# Patient Record
Sex: MALE | Race: BLACK OR AFRICAN AMERICAN | Employment: FULL TIME | URBAN - METROPOLITAN AREA
[De-identification: names, ages, dates, MRNs, and addresses within clinical notes are randomized per-mention and may not be internally consistent; named-entity substitution may affect disease eponyms.]

---

## 2017-10-11 ENCOUNTER — HOSPITAL ENCOUNTER (EMERGENCY)
Age: 42
Discharge: HOME | End: 2017-10-11
Attending: EMERGENCY MEDICINE
Payer: COMMERCIAL

## 2017-10-11 VITALS
HEIGHT: 68 IN | DIASTOLIC BLOOD PRESSURE: 119 MMHG | TEMPERATURE: 99 F | SYSTOLIC BLOOD PRESSURE: 170 MMHG | HEART RATE: 84 BPM | BODY MASS INDEX: 38.12 KG/M2 | RESPIRATION RATE: 20 BRPM | OXYGEN SATURATION: 93 % | WEIGHT: 251.54 LBS

## 2017-10-11 DIAGNOSIS — I10 HYPERTENSION, UNSPECIFIED TYPE: Primary | ICD-10-CM

## 2017-10-11 LAB
ALBUMIN SERPL-MCNC: 3.9 G/DL (ref 3.5–5)
ALBUMIN/GLOB SERPL: 1 {RATIO} (ref 1.1–2.2)
ALP SERPL-CCNC: 79 U/L (ref 45–117)
ALT SERPL-CCNC: 37 U/L (ref 12–78)
ANION GAP SERPL CALC-SCNC: 7 MMOL/L (ref 5–15)
AST SERPL-CCNC: 21 U/L (ref 15–37)
BASOPHILS # BLD: 0 K/UL (ref 0–0.1)
BASOPHILS NFR BLD: 0 % (ref 0–1)
BILIRUB SERPL-MCNC: 0.6 MG/DL (ref 0.2–1)
BUN SERPL-MCNC: 16 MG/DL (ref 6–20)
BUN/CREAT SERPL: 14 (ref 12–20)
CALCIUM SERPL-MCNC: 8.8 MG/DL (ref 8.5–10.1)
CHLORIDE SERPL-SCNC: 103 MMOL/L (ref 97–108)
CO2 SERPL-SCNC: 29 MMOL/L (ref 21–32)
CREAT SERPL-MCNC: 1.12 MG/DL (ref 0.7–1.3)
EOSINOPHIL # BLD: 0.3 K/UL (ref 0–0.4)
EOSINOPHIL NFR BLD: 3 % (ref 0–7)
ERYTHROCYTE [DISTWIDTH] IN BLOOD BY AUTOMATED COUNT: 13 % (ref 11.5–14.5)
GLOBULIN SER CALC-MCNC: 3.8 G/DL (ref 2–4)
GLUCOSE SERPL-MCNC: 99 MG/DL (ref 65–100)
HCT VFR BLD AUTO: 42.5 % (ref 36.6–50.3)
HGB BLD-MCNC: 14.6 G/DL (ref 12.1–17)
LYMPHOCYTES # BLD: 2.5 K/UL (ref 0.8–3.5)
LYMPHOCYTES NFR BLD: 31 % (ref 12–49)
MCH RBC QN AUTO: 28.4 PG (ref 26–34)
MCHC RBC AUTO-ENTMCNC: 34.4 G/DL (ref 30–36.5)
MCV RBC AUTO: 82.7 FL (ref 80–99)
MONOCYTES # BLD: 0.7 K/UL (ref 0–1)
MONOCYTES NFR BLD: 8 % (ref 5–13)
NEUTS SEG # BLD: 4.5 K/UL (ref 1.8–8)
NEUTS SEG NFR BLD: 58 % (ref 32–75)
PLATELET # BLD AUTO: 221 K/UL (ref 150–400)
POTASSIUM SERPL-SCNC: 3.8 MMOL/L (ref 3.5–5.1)
PROT SERPL-MCNC: 7.7 G/DL (ref 6.4–8.2)
RBC # BLD AUTO: 5.14 M/UL (ref 4.1–5.7)
SODIUM SERPL-SCNC: 139 MMOL/L (ref 136–145)
TROPONIN I SERPL-MCNC: <0.04 NG/ML
WBC # BLD AUTO: 8 K/UL (ref 4.1–11.1)

## 2017-10-11 PROCEDURE — 99284 EMERGENCY DEPT VISIT MOD MDM: CPT

## 2017-10-11 PROCEDURE — 93005 ELECTROCARDIOGRAM TRACING: CPT

## 2017-10-11 PROCEDURE — 85025 COMPLETE CBC W/AUTO DIFF WBC: CPT | Performed by: EMERGENCY MEDICINE

## 2017-10-11 PROCEDURE — 80053 COMPREHEN METABOLIC PANEL: CPT | Performed by: EMERGENCY MEDICINE

## 2017-10-11 PROCEDURE — 36415 COLL VENOUS BLD VENIPUNCTURE: CPT | Performed by: EMERGENCY MEDICINE

## 2017-10-11 PROCEDURE — 84484 ASSAY OF TROPONIN QUANT: CPT | Performed by: EMERGENCY MEDICINE

## 2017-10-11 RX ORDER — AMLODIPINE BESYLATE 10 MG/1
10 TABLET ORAL DAILY
Qty: 30 TAB | Refills: 0 | Status: ON HOLD | OUTPATIENT
Start: 2017-10-11 | End: 2020-05-07

## 2017-10-11 RX ORDER — SODIUM CHLORIDE 0.9 % (FLUSH) 0.9 %
5-10 SYRINGE (ML) INJECTION AS NEEDED
Status: DISCONTINUED | OUTPATIENT
Start: 2017-10-11 | End: 2017-10-12 | Stop reason: HOSPADM

## 2017-10-11 RX ORDER — SODIUM CHLORIDE 0.9 % (FLUSH) 0.9 %
5-10 SYRINGE (ML) INJECTION EVERY 8 HOURS
Status: DISCONTINUED | OUTPATIENT
Start: 2017-10-11 | End: 2017-10-12 | Stop reason: HOSPADM

## 2017-10-12 LAB
ATRIAL RATE: 84 BPM
CALCULATED P AXIS, ECG09: 46 DEGREES
CALCULATED R AXIS, ECG10: -8 DEGREES
CALCULATED T AXIS, ECG11: 175 DEGREES
DIAGNOSIS, 93000: NORMAL
P-R INTERVAL, ECG05: 154 MS
Q-T INTERVAL, ECG07: 366 MS
QRS DURATION, ECG06: 82 MS
QTC CALCULATION (BEZET), ECG08: 432 MS
VENTRICULAR RATE, ECG03: 84 BPM

## 2017-10-12 NOTE — ED PROVIDER NOTES
HPI Comments: 38 y.o. male with no significant past medical history who presents from [facility] with chief complaint of HTN. The pt reports that he had an episode of epistaxis a week ago and today. The pt went to [facility] , was found to be hypertensive, and was referred to visit the ED for an evaluation. The pt reports that he has been checking his blood pressure at [facility] for a year and it has been high. The pt also reports intermittent HA for a year. The pt denies CP and SOB. There are no other acute medical concerns at this time. PCP: No primary care provider on file. Note written by Lakhwinder Rodriguez, as dictated by Cecilio Hauser MD 9:01 PM      The history is provided by the patient. No  was used. No past medical history on file. No past surgical history on file. No family history on file. Social History     Social History    Marital status: N/A     Spouse name: N/A    Number of children: N/A    Years of education: N/A     Occupational History    Not on file. Social History Main Topics    Smoking status: Not on file    Smokeless tobacco: Not on file    Alcohol use Not on file    Drug use: Not on file    Sexual activity: Not on file     Other Topics Concern    Not on file     Social History Narrative         ALLERGIES: Benadryl [diphenhydramine hcl]    Review of Systems   Respiratory: Negative for shortness of breath. Cardiovascular: Negative for chest pain. All other systems reviewed and are negative. Vitals:    10/11/17 2042   BP: (!) 195/130   Pulse: 88   Resp: 18   Temp: 99 °F (37.2 °C)   SpO2: 96%   Weight: 114.1 kg (251 lb 8.7 oz)   Height: 5' 8\" (1.727 m)            Physical Exam   Constitutional: He is oriented to person, place, and time. He appears well-developed and well-nourished. No distress. Obese;     HENT:   Head: Normocephalic and atraumatic. Eyes: Conjunctivae are normal. No scleral icterus.    Neck: Neck supple. No tracheal deviation present. Cardiovascular: Normal rate, regular rhythm, normal heart sounds and intact distal pulses. Exam reveals no gallop and no friction rub. No murmur heard. Pulmonary/Chest: Effort normal and breath sounds normal. He has no wheezes. He has no rales. Abdominal: Soft. He exhibits no distension. There is no tenderness. There is no rebound and no guarding. Musculoskeletal: He exhibits no edema. Neurological: He is alert and oriented to person, place, and time. Skin: Skin is warm and dry. No rash noted. Psychiatric: He has a normal mood and affect. Nursing note and vitals reviewed. Note written by Lakhwinder Mchugh, as dictated by Thalia Espinal MD 9:01 PM      [facility]  ED Course       Procedures    ED EKG interpretation:  Rhythm: normal sinus rhythm; and regular . Rate (approx.): 82; Axis: normal; ST/T wave: lateral T wave inversions; LVH  Note written by Lakhwinder Mchugh, as dictated by Thalia Espinal MD 9:04 PM    Progress Note:  Results, treatment, and follow up plan have been discussed with patient. Questions were answered. Thalia Espinal MD  9:56 PM    A/P: HTN - suspect this has been an ongoing problem. Reassuring exam; EKG with LVH and non-specific. CBC, CMP, trop ok; amlodipine and PCP f/u.   Thalia Espinal MD  9:58 PM

## 2017-10-12 NOTE — ED NOTES
Patient given discharge instruction by Vilma Christianson. Verbalized understanding, pt discharge home with family.

## 2017-10-12 NOTE — ED TRIAGE NOTES
Pt reports being sent by [facility] for severe HTN.  BL=824/120 at their facility. Denies personal hx of HTN. States last week started with nose bleeds-went to [facility] for evaluation.

## 2017-10-12 NOTE — DISCHARGE INSTRUCTIONS

## 2020-05-06 ENCOUNTER — APPOINTMENT (OUTPATIENT)
Dept: GENERAL RADIOLOGY | Age: 45
DRG: 280 | End: 2020-05-06
Attending: EMERGENCY MEDICINE

## 2020-05-06 ENCOUNTER — HOSPITAL ENCOUNTER (INPATIENT)
Age: 45
LOS: 3 days | Discharge: HOME | DRG: 280 | End: 2020-05-09
Attending: EMERGENCY MEDICINE | Admitting: INTERNAL MEDICINE

## 2020-05-06 DIAGNOSIS — M79.89 LEG SWELLING: ICD-10-CM

## 2020-05-06 DIAGNOSIS — R60.9 EDEMA, UNSPECIFIED TYPE: Primary | ICD-10-CM

## 2020-05-06 DIAGNOSIS — N28.9 RENAL INSUFFICIENCY: ICD-10-CM

## 2020-05-06 DIAGNOSIS — R77.8 ELEVATED TROPONIN: ICD-10-CM

## 2020-05-06 PROBLEM — R06.09 DYSPNEA ON EXERTION: Status: ACTIVE | Noted: 2020-05-06

## 2020-05-06 LAB
ALBUMIN SERPL-MCNC: 3.5 G/DL (ref 3.5–5)
ALBUMIN/GLOB SERPL: 0.8 {RATIO} (ref 1.1–2.2)
ALP SERPL-CCNC: 86 U/L (ref 45–117)
ALT SERPL-CCNC: 51 U/L (ref 12–78)
ANION GAP SERPL CALC-SCNC: 10 MMOL/L (ref 5–15)
AST SERPL-CCNC: 33 U/L (ref 15–37)
BASOPHILS # BLD: 0.1 K/UL (ref 0–0.1)
BASOPHILS NFR BLD: 1 % (ref 0–1)
BILIRUB SERPL-MCNC: 0.6 MG/DL (ref 0.2–1)
BNP SERPL-MCNC: 6911 PG/ML
BUN SERPL-MCNC: 23 MG/DL (ref 6–20)
BUN/CREAT SERPL: 11 (ref 12–20)
CALCIUM SERPL-MCNC: 8.9 MG/DL (ref 8.5–10.1)
CHLORIDE SERPL-SCNC: 100 MMOL/L (ref 97–108)
CO2 SERPL-SCNC: 27 MMOL/L (ref 21–32)
COMMENT, HOLDF: NORMAL
CREAT SERPL-MCNC: 2.15 MG/DL (ref 0.7–1.3)
DIFFERENTIAL METHOD BLD: ABNORMAL
EOSINOPHIL # BLD: 0.2 K/UL (ref 0–0.4)
EOSINOPHIL NFR BLD: 2 % (ref 0–7)
ERYTHROCYTE [DISTWIDTH] IN BLOOD BY AUTOMATED COUNT: 13.3 % (ref 11.5–14.5)
GLOBULIN SER CALC-MCNC: 4.6 G/DL (ref 2–4)
GLUCOSE SERPL-MCNC: 122 MG/DL (ref 65–100)
HCT VFR BLD AUTO: 43.5 % (ref 36.6–50.3)
HGB BLD-MCNC: 14.6 G/DL (ref 12.1–17)
IMM GRANULOCYTES # BLD AUTO: 0.1 K/UL (ref 0–0.04)
IMM GRANULOCYTES NFR BLD AUTO: 1 % (ref 0–0.5)
INR PPP: 1 (ref 0.9–1.1)
LYMPHOCYTES # BLD: 1.6 K/UL (ref 0.8–3.5)
LYMPHOCYTES NFR BLD: 17 % (ref 12–49)
MAGNESIUM SERPL-MCNC: 2.2 MG/DL (ref 1.6–2.4)
MCH RBC QN AUTO: 28 PG (ref 26–34)
MCHC RBC AUTO-ENTMCNC: 33.6 G/DL (ref 30–36.5)
MCV RBC AUTO: 83.3 FL (ref 80–99)
MONOCYTES # BLD: 0.7 K/UL (ref 0–1)
MONOCYTES NFR BLD: 7 % (ref 5–13)
NEUTS SEG # BLD: 6.9 K/UL (ref 1.8–8)
NEUTS SEG NFR BLD: 72 % (ref 32–75)
NRBC # BLD: 0 K/UL (ref 0–0.01)
NRBC BLD-RTO: 0 PER 100 WBC
PLATELET # BLD AUTO: 151 K/UL (ref 150–400)
POTASSIUM SERPL-SCNC: 3.5 MMOL/L (ref 3.5–5.1)
PROT SERPL-MCNC: 8.1 G/DL (ref 6.4–8.2)
PROTHROMBIN TIME: 10.4 SEC (ref 9–11.1)
RBC # BLD AUTO: 5.22 M/UL (ref 4.1–5.7)
SAMPLES BEING HELD,HOLD: NORMAL
SODIUM SERPL-SCNC: 137 MMOL/L (ref 136–145)
TROPONIN I SERPL-MCNC: 0.17 NG/ML
WBC # BLD AUTO: 9.6 K/UL (ref 4.1–11.1)

## 2020-05-06 PROCEDURE — 80053 COMPREHEN METABOLIC PANEL: CPT

## 2020-05-06 PROCEDURE — 74011250636 HC RX REV CODE- 250/636: Performed by: EMERGENCY MEDICINE

## 2020-05-06 PROCEDURE — 85025 COMPLETE CBC W/AUTO DIFF WBC: CPT

## 2020-05-06 PROCEDURE — 99285 EMERGENCY DEPT VISIT HI MDM: CPT

## 2020-05-06 PROCEDURE — 74011250637 HC RX REV CODE- 250/637: Performed by: EMERGENCY MEDICINE

## 2020-05-06 PROCEDURE — 83880 ASSAY OF NATRIURETIC PEPTIDE: CPT

## 2020-05-06 PROCEDURE — 65270000029 HC RM PRIVATE

## 2020-05-06 PROCEDURE — 83735 ASSAY OF MAGNESIUM: CPT

## 2020-05-06 PROCEDURE — 93005 ELECTROCARDIOGRAM TRACING: CPT

## 2020-05-06 PROCEDURE — 84484 ASSAY OF TROPONIN QUANT: CPT

## 2020-05-06 PROCEDURE — 71045 X-RAY EXAM CHEST 1 VIEW: CPT

## 2020-05-06 PROCEDURE — 36415 COLL VENOUS BLD VENIPUNCTURE: CPT

## 2020-05-06 PROCEDURE — 85610 PROTHROMBIN TIME: CPT

## 2020-05-06 RX ORDER — HYDRALAZINE HYDROCHLORIDE 20 MG/ML
20 INJECTION INTRAMUSCULAR; INTRAVENOUS
Status: DISCONTINUED | OUTPATIENT
Start: 2020-05-06 | End: 2020-05-09 | Stop reason: HOSPADM

## 2020-05-06 RX ORDER — CLONIDINE HYDROCHLORIDE 0.1 MG/1
0.1 TABLET ORAL
Status: DISCONTINUED | OUTPATIENT
Start: 2020-05-06 | End: 2020-05-09 | Stop reason: HOSPADM

## 2020-05-06 RX ORDER — ASPIRIN 325 MG
325 TABLET ORAL
Status: COMPLETED | OUTPATIENT
Start: 2020-05-06 | End: 2020-05-06

## 2020-05-06 RX ORDER — FUROSEMIDE 10 MG/ML
40 INJECTION INTRAMUSCULAR; INTRAVENOUS
Status: COMPLETED | OUTPATIENT
Start: 2020-05-06 | End: 2020-05-06

## 2020-05-06 RX ORDER — HYDRALAZINE HYDROCHLORIDE 20 MG/ML
30 INJECTION INTRAMUSCULAR; INTRAVENOUS
Status: COMPLETED | OUTPATIENT
Start: 2020-05-06 | End: 2020-05-06

## 2020-05-06 RX ADMIN — HYDRALAZINE HYDROCHLORIDE 30 MG: 20 INJECTION INTRAMUSCULAR; INTRAVENOUS at 23:35

## 2020-05-06 RX ADMIN — FUROSEMIDE 40 MG: 10 INJECTION, SOLUTION INTRAMUSCULAR; INTRAVENOUS at 22:41

## 2020-05-06 RX ADMIN — ASPIRIN 325 MG: 325 TABLET ORAL at 22:40

## 2020-05-07 ENCOUNTER — APPOINTMENT (OUTPATIENT)
Dept: ULTRASOUND IMAGING | Age: 45
DRG: 280 | End: 2020-05-07
Attending: INTERNAL MEDICINE

## 2020-05-07 ENCOUNTER — APPOINTMENT (OUTPATIENT)
Dept: NON INVASIVE DIAGNOSTICS | Age: 45
DRG: 280 | End: 2020-05-07
Attending: INTERNAL MEDICINE

## 2020-05-07 LAB
ALBUMIN SERPL-MCNC: 3.6 G/DL (ref 3.5–5)
ALBUMIN/GLOB SERPL: 0.9 {RATIO} (ref 1.1–2.2)
ALP SERPL-CCNC: 89 U/L (ref 45–117)
ALT SERPL-CCNC: 49 U/L (ref 12–78)
ANION GAP SERPL CALC-SCNC: 9 MMOL/L (ref 5–15)
APPEARANCE UR: CLEAR
AST SERPL-CCNC: 30 U/L (ref 15–37)
ATRIAL RATE: 116 BPM
AV VELOCITY RATIO: 0.62
BACTERIA URNS QL MICRO: NEGATIVE /HPF
BASOPHILS # BLD: 0.1 K/UL (ref 0–0.1)
BASOPHILS NFR BLD: 0 % (ref 0–1)
BILIRUB SERPL-MCNC: 0.8 MG/DL (ref 0.2–1)
BILIRUB UR QL: NEGATIVE
BUN SERPL-MCNC: 24 MG/DL (ref 6–20)
BUN/CREAT SERPL: 11 (ref 12–20)
CALCIUM SERPL-MCNC: 8.9 MG/DL (ref 8.5–10.1)
CALCULATED P AXIS, ECG09: 42 DEGREES
CALCULATED R AXIS, ECG10: 8 DEGREES
CALCULATED T AXIS, ECG11: 158 DEGREES
CHLORIDE SERPL-SCNC: 98 MMOL/L (ref 97–108)
CHOLEST SERPL-MCNC: 290 MG/DL
CO2 SERPL-SCNC: 31 MMOL/L (ref 21–32)
COLOR UR: ABNORMAL
COMMENT, HOLDF: NORMAL
CREAT SERPL-MCNC: 2.21 MG/DL (ref 0.7–1.3)
CREAT UR-MCNC: 99 MG/DL
DIAGNOSIS, 93000: NORMAL
DIFFERENTIAL METHOD BLD: ABNORMAL
ECHO AO ROOT DIAM: 3.6 CM
ECHO AV AREA PEAK VELOCITY: 2.1 CM2
ECHO AV PEAK GRADIENT: 14.2 MMHG
ECHO AV PEAK VELOCITY: 188.12 CM/S
ECHO EST RA PRESSURE: 3 MMHG
ECHO LA MAJOR AXIS: 4.41 CM
ECHO LA TO AORTIC ROOT RATIO: 1.22
ECHO LA VOL 2C: 113.44 ML (ref 18–58)
ECHO LA VOL 4C: 72.78 ML (ref 18–58)
ECHO LA VOL BP: 96.93 ML (ref 18–58)
ECHO LA VOL/BSA BIPLANE: 42.77 ML/M2 (ref 16–28)
ECHO LA VOLUME INDEX A2C: 50.06 ML/M2 (ref 16–28)
ECHO LA VOLUME INDEX A4C: 32.12 ML/M2 (ref 16–28)
ECHO LV INTERNAL DIMENSION DIASTOLIC: 5.51 CM (ref 4.2–5.9)
ECHO LV INTERNAL DIMENSION SYSTOLIC: 3.91 CM
ECHO LV IVSD: 1.63 CM (ref 0.6–1)
ECHO LV MASS 2D: 479.9 G (ref 88–224)
ECHO LV MASS INDEX 2D: 211.8 G/M2 (ref 49–115)
ECHO LV POSTERIOR WALL DIASTOLIC: 1.49 CM (ref 0.6–1)
ECHO LVOT DIAM: 2.07 CM
ECHO LVOT PEAK GRADIENT: 5.5 MMHG
ECHO LVOT PEAK VELOCITY: 117.02 CM/S
ECHO MV A VELOCITY: 45.36 CM/S
ECHO MV E DECELERATION TIME (DT): 156.6 MS
ECHO MV E VELOCITY: 95.82 CM/S
ECHO MV E/A RATIO: 2.11
ECHO MV REGURGITANT PEAK GRADIENT: 47.3 MMHG
ECHO MV REGURGITANT PEAK VELOCITY: 343.96 CM/S
ECHO PULMONARY ARTERY SYSTOLIC PRESSURE (PASP): 31.8 MMHG
ECHO PV MAX VELOCITY: 104.13 CM/S
ECHO PV PEAK GRADIENT: 4.3 MMHG
ECHO RIGHT VENTRICULAR SYSTOLIC PRESSURE (RVSP): 31.8 MMHG
ECHO RV INTERNAL DIMENSION: 3.95 CM
ECHO TV REGURGITANT MAX VELOCITY: 268.33 CM/S
ECHO TV REGURGITANT PEAK GRADIENT: 28.8 MMHG
EOSINOPHIL # BLD: 0.1 K/UL (ref 0–0.4)
EOSINOPHIL NFR BLD: 1 % (ref 0–7)
EPITH CASTS URNS QL MICRO: ABNORMAL /LPF
ERYTHROCYTE [DISTWIDTH] IN BLOOD BY AUTOMATED COUNT: 13.2 % (ref 11.5–14.5)
GLOBULIN SER CALC-MCNC: 3.9 G/DL (ref 2–4)
GLUCOSE SERPL-MCNC: 146 MG/DL (ref 65–100)
GLUCOSE UR STRIP.AUTO-MCNC: NEGATIVE MG/DL
HBV SURFACE AG SER QL: <0.1 INDEX
HBV SURFACE AG SER QL: NEGATIVE
HCT VFR BLD AUTO: 44 % (ref 36.6–50.3)
HCV AB SERPL QL IA: NONREACTIVE
HCV COMMENT,HCGAC: NORMAL
HDLC SERPL-MCNC: 68 MG/DL
HDLC SERPL: 4.3 {RATIO} (ref 0–5)
HGB BLD-MCNC: 14.6 G/DL (ref 12.1–17)
HGB UR QL STRIP: NEGATIVE
IMM GRANULOCYTES # BLD AUTO: 0.1 K/UL (ref 0–0.04)
IMM GRANULOCYTES NFR BLD AUTO: 1 % (ref 0–0.5)
KETONES UR QL STRIP.AUTO: NEGATIVE MG/DL
LDLC SERPL CALC-MCNC: 184 MG/DL (ref 0–100)
LEUKOCYTE ESTERASE UR QL STRIP.AUTO: NEGATIVE
LIPID PROFILE,FLP: ABNORMAL
LVFS 2D: 28.98 %
LYMPHOCYTES # BLD: 1 K/UL (ref 0.8–3.5)
LYMPHOCYTES NFR BLD: 8 % (ref 12–49)
MCH RBC QN AUTO: 27.7 PG (ref 26–34)
MCHC RBC AUTO-ENTMCNC: 33.2 G/DL (ref 30–36.5)
MCV RBC AUTO: 83.5 FL (ref 80–99)
MONOCYTES # BLD: 0.8 K/UL (ref 0–1)
MONOCYTES NFR BLD: 6 % (ref 5–13)
MV DEC SLOPE: 6.12
NEUTS SEG # BLD: 10.6 K/UL (ref 1.8–8)
NEUTS SEG NFR BLD: 84 % (ref 32–75)
NITRITE UR QL STRIP.AUTO: NEGATIVE
NRBC # BLD: 0 K/UL (ref 0–0.01)
NRBC BLD-RTO: 0 PER 100 WBC
P-R INTERVAL, ECG05: 130 MS
PH UR STRIP: 7 [PH] (ref 5–8)
PLATELET # BLD AUTO: 209 K/UL (ref 150–400)
PMV BLD AUTO: 10.3 FL (ref 8.9–12.9)
POTASSIUM SERPL-SCNC: 3.4 MMOL/L (ref 3.5–5.1)
PROT SERPL-MCNC: 7.5 G/DL (ref 6.4–8.2)
PROT UR STRIP-MCNC: 300 MG/DL
PROT UR-MCNC: 233 MG/DL (ref 0–11.9)
PULMONARY ARTERY END DIASTOLIC PRESSURE: 5.6 MMHG
PULMONARY ARTERY MEAN PRESURE: 14.3 MMHG
PV END DIASTOLIC VELOCITY: 0.8 MMHG
Q-T INTERVAL, ECG07: 310 MS
QRS DURATION, ECG06: 72 MS
QTC CALCULATION (BEZET), ECG08: 430 MS
RBC # BLD AUTO: 5.27 M/UL (ref 4.1–5.7)
RBC #/AREA URNS HPF: ABNORMAL /HPF (ref 0–5)
SAMPLES BEING HELD,HOLD: NORMAL
SODIUM SERPL-SCNC: 138 MMOL/L (ref 136–145)
SP GR UR REFRACTOMETRY: 1.01 (ref 1–1.03)
TRIGL SERPL-MCNC: 190 MG/DL (ref ?–150)
TROPONIN I SERPL-MCNC: 0.16 NG/ML
TSH SERPL DL<=0.05 MIU/L-ACNC: 2.37 UIU/ML (ref 0.36–3.74)
UR CULT HOLD, URHOLD: NORMAL
UROBILINOGEN UR QL STRIP.AUTO: 0.2 EU/DL (ref 0.2–1)
VENTRICULAR RATE, ECG03: 116 BPM
VLDLC SERPL CALC-MCNC: 38 MG/DL
WBC # BLD AUTO: 12.6 K/UL (ref 4.1–11.1)
WBC URNS QL MICRO: ABNORMAL /HPF (ref 0–4)

## 2020-05-07 PROCEDURE — 86160 COMPLEMENT ANTIGEN: CPT

## 2020-05-07 PROCEDURE — 93306 TTE W/DOPPLER COMPLETE: CPT

## 2020-05-07 PROCEDURE — 83835 ASSAY OF METANEPHRINES: CPT

## 2020-05-07 PROCEDURE — 87340 HEPATITIS B SURFACE AG IA: CPT

## 2020-05-07 PROCEDURE — 82570 ASSAY OF URINE CREATININE: CPT

## 2020-05-07 PROCEDURE — 84443 ASSAY THYROID STIM HORMONE: CPT

## 2020-05-07 PROCEDURE — 83516 IMMUNOASSAY NONANTIBODY: CPT

## 2020-05-07 PROCEDURE — 84156 ASSAY OF PROTEIN URINE: CPT

## 2020-05-07 PROCEDURE — 86038 ANTINUCLEAR ANTIBODIES: CPT

## 2020-05-07 PROCEDURE — 82384 ASSAY THREE CATECHOLAMINES: CPT

## 2020-05-07 PROCEDURE — 65660000000 HC RM CCU STEPDOWN

## 2020-05-07 PROCEDURE — 82088 ASSAY OF ALDOSTERONE: CPT

## 2020-05-07 PROCEDURE — 85025 COMPLETE CBC W/AUTO DIFF WBC: CPT

## 2020-05-07 PROCEDURE — 77010033678 HC OXYGEN DAILY

## 2020-05-07 PROCEDURE — 80053 COMPREHEN METABOLIC PANEL: CPT

## 2020-05-07 PROCEDURE — 74011250636 HC RX REV CODE- 250/636: Performed by: INTERNAL MEDICINE

## 2020-05-07 PROCEDURE — 36415 COLL VENOUS BLD VENIPUNCTURE: CPT

## 2020-05-07 PROCEDURE — 74011250637 HC RX REV CODE- 250/637: Performed by: INTERNAL MEDICINE

## 2020-05-07 PROCEDURE — 86256 FLUORESCENT ANTIBODY TITER: CPT

## 2020-05-07 PROCEDURE — 80061 LIPID PANEL: CPT

## 2020-05-07 PROCEDURE — 76770 US EXAM ABDO BACK WALL COMP: CPT

## 2020-05-07 PROCEDURE — 81001 URINALYSIS AUTO W/SCOPE: CPT

## 2020-05-07 PROCEDURE — 86803 HEPATITIS C AB TEST: CPT

## 2020-05-07 PROCEDURE — 94760 N-INVAS EAR/PLS OXIMETRY 1: CPT

## 2020-05-07 PROCEDURE — 84484 ASSAY OF TROPONIN QUANT: CPT

## 2020-05-07 PROCEDURE — 84165 PROTEIN E-PHORESIS SERUM: CPT

## 2020-05-07 RX ORDER — SODIUM CHLORIDE 0.9 % (FLUSH) 0.9 %
5-40 SYRINGE (ML) INJECTION AS NEEDED
Status: DISCONTINUED | OUTPATIENT
Start: 2020-05-07 | End: 2020-05-09 | Stop reason: HOSPADM

## 2020-05-07 RX ORDER — FUROSEMIDE 10 MG/ML
20 INJECTION INTRAMUSCULAR; INTRAVENOUS EVERY 12 HOURS
Status: DISCONTINUED | OUTPATIENT
Start: 2020-05-07 | End: 2020-05-09

## 2020-05-07 RX ORDER — AMLODIPINE BESYLATE 5 MG/1
10 TABLET ORAL DAILY
Status: DISCONTINUED | OUTPATIENT
Start: 2020-05-07 | End: 2020-05-09 | Stop reason: HOSPADM

## 2020-05-07 RX ORDER — ENOXAPARIN SODIUM 100 MG/ML
40 INJECTION SUBCUTANEOUS EVERY 24 HOURS
Status: DISCONTINUED | OUTPATIENT
Start: 2020-05-07 | End: 2020-05-09 | Stop reason: HOSPADM

## 2020-05-07 RX ORDER — ATORVASTATIN CALCIUM 20 MG/1
40 TABLET, FILM COATED ORAL DAILY
Status: DISCONTINUED | OUTPATIENT
Start: 2020-05-08 | End: 2020-05-09 | Stop reason: HOSPADM

## 2020-05-07 RX ORDER — METOPROLOL TARTRATE 25 MG/1
25 TABLET, FILM COATED ORAL EVERY 12 HOURS
Status: DISCONTINUED | OUTPATIENT
Start: 2020-05-07 | End: 2020-05-08

## 2020-05-07 RX ORDER — SODIUM CHLORIDE 0.9 % (FLUSH) 0.9 %
5-40 SYRINGE (ML) INJECTION EVERY 8 HOURS
Status: DISCONTINUED | OUTPATIENT
Start: 2020-05-07 | End: 2020-05-09 | Stop reason: HOSPADM

## 2020-05-07 RX ORDER — LABETALOL HCL 20 MG/4 ML
20 SYRINGE (ML) INTRAVENOUS
Status: DISCONTINUED | OUTPATIENT
Start: 2020-05-07 | End: 2020-05-09 | Stop reason: HOSPADM

## 2020-05-07 RX ORDER — ACETAMINOPHEN 325 MG/1
650 TABLET ORAL
Status: DISCONTINUED | OUTPATIENT
Start: 2020-05-07 | End: 2020-05-09 | Stop reason: HOSPADM

## 2020-05-07 RX ORDER — POTASSIUM CHLORIDE 750 MG/1
40 TABLET, FILM COATED, EXTENDED RELEASE ORAL
Status: COMPLETED | OUTPATIENT
Start: 2020-05-07 | End: 2020-05-07

## 2020-05-07 RX ORDER — ONDANSETRON 2 MG/ML
4 INJECTION INTRAMUSCULAR; INTRAVENOUS
Status: DISCONTINUED | OUTPATIENT
Start: 2020-05-07 | End: 2020-05-09 | Stop reason: HOSPADM

## 2020-05-07 RX ORDER — GUAIFENESIN 100 MG/5ML
81 LIQUID (ML) ORAL DAILY
Status: DISCONTINUED | OUTPATIENT
Start: 2020-05-07 | End: 2020-05-09 | Stop reason: HOSPADM

## 2020-05-07 RX ADMIN — HYDRALAZINE HYDROCHLORIDE 20 MG: 20 INJECTION INTRAMUSCULAR; INTRAVENOUS at 14:12

## 2020-05-07 RX ADMIN — CLONIDINE HYDROCHLORIDE 0.1 MG: 0.1 TABLET ORAL at 11:35

## 2020-05-07 RX ADMIN — LABETALOL HYDROCHLORIDE 20 MG: 5 INJECTION, SOLUTION INTRAVENOUS at 03:46

## 2020-05-07 RX ADMIN — Medication 10 ML: at 01:59

## 2020-05-07 RX ADMIN — AMLODIPINE BESYLATE 10 MG: 5 TABLET ORAL at 09:36

## 2020-05-07 RX ADMIN — FUROSEMIDE 20 MG: 10 INJECTION, SOLUTION INTRAMUSCULAR; INTRAVENOUS at 21:43

## 2020-05-07 RX ADMIN — POTASSIUM CHLORIDE 40 MEQ: 750 TABLET, FILM COATED, EXTENDED RELEASE ORAL at 08:04

## 2020-05-07 RX ADMIN — Medication 10 ML: at 21:45

## 2020-05-07 RX ADMIN — FUROSEMIDE 20 MG: 10 INJECTION, SOLUTION INTRAMUSCULAR; INTRAVENOUS at 08:07

## 2020-05-07 RX ADMIN — ENOXAPARIN SODIUM 40 MG: 40 INJECTION SUBCUTANEOUS at 01:58

## 2020-05-07 RX ADMIN — LABETALOL HYDROCHLORIDE 20 MG: 5 INJECTION, SOLUTION INTRAVENOUS at 16:41

## 2020-05-07 RX ADMIN — METOPROLOL TARTRATE 25 MG: 25 TABLET, FILM COATED ORAL at 18:16

## 2020-05-07 RX ADMIN — Medication 10 ML: at 05:16

## 2020-05-07 RX ADMIN — CLONIDINE HYDROCHLORIDE 0.1 MG: 0.1 TABLET ORAL at 02:08

## 2020-05-07 RX ADMIN — ASPIRIN 81 MG 81 MG: 81 TABLET ORAL at 08:05

## 2020-05-07 NOTE — NURSE NAVIGATOR
Chart reviewed by Heart Failure Nurse Navigator. Heart Failure database completed. Patient admitted with hypertensive emergency, LE edema, and increasing BOSTON. Patient was not on any home medications and does not currently have PCP. He was seen with similar presentation a year ago and has not followed up. Patient recently laid off from job. EF:  60 to 65% with severe concentric hypertrophy    ACEi/ARB/ARNi: **    BB: **    Aldosterone Antagonist: **    Obstructive Sleep Apnea Screening:   STOP-BANG score:   Referred to Sleep Medicine:     CRT Not indicated. NYHA Functional Class **. Heart Failure Teach Back in Patient Education. Heart Failure Avoiding Triggers on Discharge Instructions. Cardiologist: Dr. Luis Iglesias discharge follow up phone call to be made within 48-72 hours of discharge.

## 2020-05-07 NOTE — PROGRESS NOTES
2300 TRANSFER - IN REPORT:    Verbal report received from Joshua RN(name) on [facility]  being received from ED(unit) for routine progression of care      Report consisted of patients Situation, Background, Assessment and   Recommendations(SBAR). Information from the following report(s) SBAR, Kardex, ED Summary, Recent Results and Cardiac Rhythm ST was reviewed with the receiving nurse. Opportunity for questions and clarification was provided. Assessment completed upon patients arrival to unit and care assumed. 0045 Dual skin, no wounds noted, admission questions completed, STAND screening, no difficulty swallowing.    0212 labs drawn troponin 0.16    0315 Notified Dr Silvestre Ram, patients BP remains high.(189/93, )  20mg IV Hyralazine was given in Ed, 0.1 clonidine tablet given.  will review and place orders. 0346 20 mg IV labetalol given     0433 /94, HR 90    0516 patient sleeping    0607 /90, HR 89    Bedside and Verbal shift change report given to 611 Anika Wilson (oncoming nurse) by Rosendo Ramirez (offgoing nurse). Report included the following information SBAR, Kardex, Procedure Summary and Cardiac Rhythm NSR.

## 2020-05-07 NOTE — ED PROVIDER NOTES
72-year-old male presents from home via private vehicle with a chief complaint of shortness of breath. He says he has had symptoms off and on for the past couple weeks ever since pollen season began. But they have gotten worse in the past several days. He reports difficulty breathing, shortness of breath, worsened with exertion. He also reports lower extremity swelling. He has been coughing up clear sputum. No fever, vomiting, diarrhea. No known exposure to coronavirus. Patient has medical history of untreated hypertension and is concerned about congestive heart failure. Denies smoking cigarettes. Takes no daily medications. History reviewed. No pertinent past medical history. History reviewed. No pertinent surgical history. No family history on file.     Social History     Socioeconomic History    Marital status:      Spouse name: Not on file    Number of children: Not on file    Years of education: Not on file    Highest education level: Not on file   Occupational History    Not on file   Social Needs    Financial resource strain: Not on file    Food insecurity     Worry: Not on file     Inability: Not on file    Transportation needs     Medical: Not on file     Non-medical: Not on file   Tobacco Use    Smoking status: Not on file   Substance and Sexual Activity    Alcohol use: Not on file    Drug use: Not on file    Sexual activity: Not on file   Lifestyle    Physical activity     Days per week: Not on file     Minutes per session: Not on file    Stress: Not on file   Relationships    Social connections     Talks on phone: Not on file     Gets together: Not on file     Attends Nondenominational service: Not on file     Active member of club or organization: Not on file     Attends meetings of clubs or organizations: Not on file     Relationship status: Not on file    Intimate partner violence     Fear of current or ex partner: Not on file     Emotionally abused: Not on file     Physically abused: Not on file     Forced sexual activity: Not on file   Other Topics Concern    Not on file   Social History Narrative    Not on file         ALLERGIES: Benadryl [diphenhydramine hcl]    Review of Systems   Constitutional: Negative for diaphoresis and fever. HENT: Negative for facial swelling. Eyes: Negative for visual disturbance. Respiratory: Positive for shortness of breath. Cardiovascular: Positive for leg swelling. Negative for chest pain. Gastrointestinal: Negative for abdominal pain. Genitourinary: Negative for dysuria. Musculoskeletal: Negative for joint swelling. Skin: Negative for rash. Neurological: Negative for headaches. Hematological: Negative for adenopathy. Psychiatric/Behavioral: Negative for suicidal ideas. Vitals:    05/06/20 1942   BP: (!) 134/108   Pulse: (!) 116   Resp: 18   Temp: 98.2 °F (36.8 °C)   SpO2: 93%   Weight: 117.9 kg (260 lb)   Height: 5' 8\" (1.727 m)            Physical Exam  Vitals signs and nursing note reviewed. Constitutional:       Appearance: He is well-developed. He is obese. HENT:      Head: Normocephalic and atraumatic. Eyes:      Pupils: Pupils are equal, round, and reactive to light. Neck:      Musculoskeletal: Normal range of motion and neck supple. Cardiovascular:      Rate and Rhythm: Normal rate and regular rhythm. Heart sounds: Normal heart sounds. Pulmonary:      Effort: Tachypnea and respiratory distress present. No accessory muscle usage. Breath sounds: No stridor. Examination of the right-middle field reveals rhonchi. Examination of the left-middle field reveals rhonchi. Examination of the right-lower field reveals rhonchi. Examination of the left-lower field reveals rhonchi. Rhonchi present. No decreased breath sounds or wheezing. Abdominal:      General: Bowel sounds are normal. There is no distension. Palpations: Abdomen is soft. Tenderness:  There is no abdominal tenderness. Musculoskeletal: Normal range of motion. Skin:     General: Skin is warm and dry. Neurological:      Mental Status: He is alert and oriented to person, place, and time. MDM  Number of Diagnoses or Management Options  Edema, unspecified type:   Elevated troponin:   Leg swelling:   Renal insufficiency:   Diagnosis management comments: Assessment: 27-year-old male with symptoms consistent with congestive heart failure. Chest x-ray and blood work supports this. Also with elevated creatinine, troponin elevated, proBNP elevated. Patient given aspirin and Lasix in the ED. No chest pain at this time. We will plan on admission for further evaluation. No significant concern for coronavirus at this time. ED Course as of May 06 2210   Wed May 06, 2020   2055 ED EKG interpretation:  Rhythm: sinus tach. Rate (approx.): 116. Axis: normal.  ST segment:  No concerning ST elevations or depressions. LVH. This EKG was interpreted by Latrice Santana MD,ED Provider. EKG, 12 LEAD, INITIAL [JM]      ED Course User Index  [JM] Rhina Olivia MD     Hospitalist Perfect Serve for Admission  10:11 PM    ED Room Number: ER08/08  Patient Name and age: Jm De Jesus 39 y.o.  male  Working Diagnosis:   1. Edema, unspecified type    2. Elevated troponin    3. Leg swelling    4. Renal insufficiency        COVID-19 Suspicion:  no    Readmission: no  Isolation Requirements:  no  Recommended Level of Care:  telemetry  Department:[facility] ED - [phone]  Other:  Sob and christianson. Lower ext edema. Trop 0.17. Cr 2.15. Pro [id number]=[id number]. No h/o cad. On 2LNC with sats in mid-90's. Given ASA and lasix in ED. Procedures    10:14 PM  I have spent *36** minutes of critical care time involved in lab review, consultations with specialist, family decision-making, and documentation. During this entire length of time I was immediately available to the patient and/or family.

## 2020-05-07 NOTE — CONSULTS
[address]     Name:  Haritha Rivera  MR#:  [id number]  :  1975  ACCOUNT #:  [de-identified]  DATE OF SERVICE:  2020    NEPHROLOGY CONSULTATION    REQUESTING PHYSICIAN:  Yumiko White MD    CHIEF COMPLAINT:  Elevated creatinine and hypertension. HISTORY OF PRESENT ILLNESS:  The patient is a 47-year-old [facility] American male with a recent history of hypertension. He was apparently diagnosed after an ER visit about 8 months ago and was prescribed amlodipine but he subsequently quit taking it. He came to the hospital with swelling and shortness of breath yesterday and had evidence of heart failure on clinical evaluation. His creatinine is 2.2. Back in 2017, creatinine was 1.12. Urinalysis is positive for protein. He has received some blood pressure medication and blood pressures are improved but were markedly elevated when he initially presented. We have been asked to see him regarding hypertension and abnormal renal function. REVIEW OF SYSTEMS:  CONSTITUTIONAL:  No fevers or chills. GI:  No nausea, vomiting, diarrhea. CARDIOVASCULAR:  Positive for shortness of breath. Positive for edema. GENITOURINARY:  No foamy urine or hematuria. NEUROLOGIC:  No headache, seizures or blurry vision. All other systems reviewed and are negative except as per history of present illness. PAST MEDICAL HISTORY:  Hypertension. SOCIAL HISTORY:  He is . He does not smoke. He does not drink alcohol. FAMILY HISTORY:  No known family history of renal disease or hypertension. PHYSICAL EXAMINATION:  VITAL SIGNS:  Temperature 98.4, pulse 89, blood pressure 144/94. GENERAL:  Obese  male, in no acute distress. HEENT:  Eyes anicteric. Extraocular movements intact. ENMT:  Mucous membranes are moist.  There is no epistaxis. NECK:  Nontender. There is no JVD. HEART:  Regular.   There is 1+ to 2+ pitting lower extremity edema.  RESPIRATORY:  Lungs are clear with good effort. GI:  Abdomen is obese and nontender. Bowel sounds are active. There is no bruit. There is no guarding or rebound. SKIN:  Warm with normal turgor. There is no rash. MUSCULOSKELETAL:  There is no cyanosis or clubbing. There is no synovitis in fingers or wrists bilaterally. LABORATORY:  Sodium 138, potassium 3.4, chloride 98, bicarb 31, BUN 24, creatinine 2.21, albumin 3.6. White count 12.6, hemoglobin 14.6, platelets 575. Urinalysis shows 300 mg/dL protein, no blood. Donley microscopic exam.    RADIOGRAPHIC STUDIES:  Chest x-ray shows mild-to-moderate interstitial pulmonary edema. Cardiovascular studies:  EKG shows sinus tachycardia with left ventricular hypertrophy. ASSESSMENT:  1. Uncontrolled hypertension, likely essential.  However severity of elevation in blood pressure, relatively young age, and absence of known family history would warrant consideration of secondary causes. Renal artery stenosis is unlikely in a young man without history of vascular disease or smoking but if his ultrasound shows asymmetry might be considered. Testing for hyperaldosteronism is certainly appropriate and we can do plasma for metanephrines and catecholamines as well. 2.  Elevated serum creatinine, likely a significant chronic component and possibly a superimposed acute component due to vascular congestion and uncontrolled hypertension. Interestingly, his urinalysis is positive for protein which may be secondary to hypertension or a primary glomerular process that could be contributing to hypertension. PLAN:  1. Check aldosterone to renin ratio. 2.  Check plasma catecholamines and metanephrines. 3.  Resume amlodipine and monitor blood pressure. Ultimately, he would probably benefit from an angiotensin receptor blocker as it appears he has proteinuria. 4.  Check quantitative urinary protein. 5.  Check serologies.   6.  We will follow with you to assist in his management during this hospitalization.       Gemini Flores MD      [id number]  D:  05/07/2020 8:23  T:  05/07/2020 11:08  JOB #:  [id number]

## 2020-05-07 NOTE — ACP (ADVANCE CARE PLANNING)
Initial ACP reviewed addressed with pt in the ED and again reviewed with pt. Pt desires to be a full code.     Nguyen Hair

## 2020-05-07 NOTE — H&P
[facility] [place] [address]  HISTORY AND PHYSICAL    Name:  Prerna Dutta  MR#:  [id number]  :  1975  ACCOUNT #:  [de-identified]    DATE OF SERVICE:  2020    PRIMARY CARE PHYSICIAN:  None. CHIEF COMPLAINT:  Shortness of breath and generalized swelling. HISTORY OF PRESENT ILLNESS:  44-year-old gentleman with a past medical history significant for uncontrolled primary hypertension was brought to the emergency room from home via private vehicle for complaints of an approximately four to five day history of progressively worsening shortness of breath and generalized swelling. Of note, patient stated that he was seen at the emergency room for uncontrolled blood pressure in the past and was instructed to follow up outpatient. However, did not follow through with recommendations. Patient also stated that he has been very careless with his diet. Patient described worsening shortness of breath on the morning of the emergency room presentation of 2020 whereby after just taking about five steps, would need to stop to \"catch\" his breath. Patient denied any associated headaches, dizziness, visual deficits, chest pains, palpitations, sore throat, cough, sputum production, nausea, vomiting, fevers, chills, abdominal pain, bladder or bowel irregularities. PAST MEDICAL HISTORY:  Primary hypertension. PAST SURGICAL HISTORY:  Nil of note. HOME MEDICATIONS:  Norvasc 10 mg p.o. daily. ALLERGIES:  BENADRYL. SOCIAL HISTORY:  Significant for being . Lives at home with his family. Denied any cigarette smoking or alcohol use disorder. Denied any recent travels or sick contacts. FAMILY HISTORY:  Reviewed and negative for any premature coronary artery disease or death. Negative for diabetes mellitus. Negative for malignancy.     REVIEW OF SYSTEMS:  A complete system review conducted essentially negative, otherwise as outlined in the history of the presenting illness. PHYSICAL EXAMINATION:  GENERAL:  The patient was awake, alert, mildly dyspneic while speaking. VITAL SIGNS:  Blood pressure 134/108, heart rate 116, respiratory rate 18, afebrile, saturating 93% on room air. HEAD:  Normocephalic. Pupils equal and reactive to light without any nystagmus. ENT:  Ear, nose, throat clear. NECK:  No jugular venous distention or carotid bruits. CARDIOVASCULAR:  S1, S2 present without murmurs. RESPIRATORY: Lungs with decreased air entry at both bases without any crepitations or rhonchi. GI:  Abdomen obese. Bowel sounds hypoactive. No tenderness elicited on superficial or deep palpation. No rebound, no guarding. GENITOURINARY:  No suprapubic or flank tenderness. MUSCULOSKELETAL:  About a 4+ bipedal pitting edema. SKIN:  No rashes, petechiae or ecchymoses. CNS:  The patient is awake, alert; oriented to time, date, place, person. LABORATORY DATA/RADIOGRAPHIC FINDINGS:      Chest x-ray with mild-to-moderate interstitial pulmonary edema. A 12-lead EKG personally reviewed with sinus tachycardia at 116 per minute. Left atrial enlargement. Left ventricular hypertrophy. Metabolic panel with sodium of 137, potassium 3.5, chloride 100, bicarb 27, a BUN of 23, a creatinine 2.15, glucose of 122, calcium 8.9, magnesium 2.2. Total bilirubin 0.6, alkaline phosphatase of 86. Troponin of 0.17 with a proBNP of 6911. ASSESSMENT/PLAN:  1. Cardiovascular:  Probable new-onset congestive heart failure with anasarca and markedly elevated BNP level. Probable demand ischemia with mildly elevated troponin due to the new-onset heart failure. Oxygen, IV diuresis, daily weight, input/output monitoring, daily congestive heart failure teaching. Accelerated hypertension with history of uncontrolled Primary hypertension. 2-D echocardiogram to evaluate further for hypertensive heart disease. Will aim to optimize blood pressure control with Norvasc, clonidine and hydralazine. Cardiology and Heart failure Team consults. 2.  Renal:  Acute kidney injury, probably due to some acute tubular necrosis. Renal ultrasound to evaluate for obstructive uropathy. Close monitoring of kidney function whilst being diuresed`. As-needed Nephrology consult. 3.  Endocrine:  Morbid obesity with a body mass index greater than 40. Therapeutic lifestyle changes counseling done. 4.  Prophylaxis for deep venous thrombosis. 5.  Directives:  Full code with a guarded prognosis. Discussed with the patient. In summary, a 40-year gentleman with a past medical history significant for uncontrolled primary hypertension without any outpatient followup, is being admitted to the inpatient level for new-onset congestive heart failure, probable demand ischemia with mildly elevated troponin due to the new-onset heart failure, acute kidney injury and accelerated hypertension necessitating ongoing close monitoring and management including with oxygen, IV diuresis, optimization of blood pressure control, renal ultrasound to evaluate for obstructive uropathy, echocardiogram to evaluate for hypertensive heart disease, Cardiology and Heart failure Team consults. The entire admission plans discussed in detail with the patient. All questions and concerns were addressed. Patient's functional status prior to admission significant for  patient being able to ambulate unassisted. Total time for admission 40 minutes, of which at least 50% of the time was spent in plan of care and counseling of  patient.       MD MACEY [id number]  D:  05/06/2020 23:34  T:  05/07/2020 0:15  JOB #:  [id number]

## 2020-05-07 NOTE — ED NOTES
TRANSFER - OUT REPORT:    Verbal report given to Berhane Jack (name) on Trixie Jimenez  being transferred to  (unit) for routine progression of care       Report consisted of patients Situation, Background, Assessment and   Recommendations(SBAR). Information from the following report(s) SBAR, ED Summary, [facility] and Recent Results was reviewed with the receiving nurse. Lines:   Peripheral IV 05/06/20 Right Antecubital (Active)   Site Assessment Clean, dry, & intact 5/6/2020  9:21 PM   Phlebitis Assessment 0 5/6/2020  9:21 PM   Infiltration Assessment 0 5/6/2020  9:21 PM   Dressing Status Clean, dry, & intact 5/6/2020  9:21 PM   Dressing Type Tape;Transparent 5/6/2020  9:21 PM   Hub Color/Line Status Pink;Patent; Flushed 5/6/2020  9:21 PM   Action Taken Blood drawn 5/6/2020  9:21 PM        Opportunity for questions and clarification was provided.       Patient transported with:   Monitor  Registered Nurse

## 2020-05-07 NOTE — PROGRESS NOTES
Shift Change:    Bedside and Verbal shift change report given to Danielito Stokes RN (oncoming nurse) by Bucky Nova RN (offgoing nurse). Report included the following information SBAR, Kardex, ED Summary, [facility] and Recent Results. 1136: PRN Clonidine 0.1mg given for elevated BP, will recheck. 1420: BP remains elevated. Gave PRN Hydralazine, will reassess     1640: Spoke with Dr. Adonis Borges about patients elevated blood pressures today. Orders to give PRN IV Labetelol now. Will reassess. 1800: MD aware of patient BP, scheduled Metoprolol given       Shift Change:    Bedside and Verbal shift change report given to [address] [place] [address] (oncoming nurse) by Danielito Stokes RN (offgoing nurse). Report included the following information SBAR, Kardex, Intake/Output, MAR and Recent Results.

## 2020-05-07 NOTE — CDMP QUERY
(2) Patient admitted with acute pulmonary edema, probable new acute CHF Hypertensive emergency and SURJIT, noted to have elevated troponins. 5/7 Cardio consult states \"Type II MI - likely 2/2 demand ischemia\" If possible, please document in progress notes and d/c summary if you are evaluating and/or treating any of the following: 
 
=> Type 2 MI 2/2 demand ischemia from HTN Emergency, SURJIT and acute pulmonary edema 
=> Type 2 MI Ruled out  
=> Other Explanation of clinical findings 
=> Clinically Undetermined (no explanation for clinical findings) The medical record reflects the following: 
   Risk Factors: 38 yo M admitted with acute pulmonary edema, probable new acute CHF,  Hypertensive emergency and SURJIT Clinical Indicators: trop .04 .17 .16  EKG: Left ventricular hypertrophy 5/7 Cardio consult states \"Type II MI - likely 2/2 demand ischemia\" Treatment:  Follow troponin's, Echo ordered ,stress test as outpatient Thank you for your time [facility] RN/BSN, CCDS Desk:   [phone] Other:  [phone] ? Type 2 (MI secondary to an ischemic imbalance): MI consequent to increased oxygen demand or decreased supply (eg, coronary endothelial dysfunction, coronary artery spasm, coronary artery embolus, tachy-/letty-arrhythmias, anemia, respiratory failure, hypertension, or hypotension). Reference Oziel PRO, Vanesa Mckee MD MPH, Sharmaine Goldberg. (July 5, 2016). Criteria for the Diagnosis of Acute Myocardial Infarction. In UpToDate (Topic  52, Version 36.0).  Retrieved from [email or web address]

## 2020-05-07 NOTE — PROGRESS NOTES
Reason for Admission:  5 day course of worsening shortness of breath, rule-out new onset CHF with anasarca and markedly elevated BNP. accelerated HTN,SURJIT                   RUR Score:         9%            Plan for utilizing home health: To be determined        PCP: First and Last name:  Dinh Stark need to be set up with PCP prior to DC   Name of Practice:    Are you a current patient: Yes/No:    Approximate date of last visit:                     Current Advanced Directive/Advance Care Plan: full code,no AMD on file                         Transition of Care Plan:                    Pt was admitted to telemetry with possible new onset CHF. Pt has had a 5 day history of worsening SOB. Prior to admission,pt became quite dyspneic wityh exertion. Pt lives with his spouse,Mariam Benitez @ the address on demographics. Mariam's number is [phone]. Pt states there are 6 entry steps into their two level home. Recently,pt states he has had difficulty @ times getting up and down his steps. From the first to second floor,there are only 3 steps which he manages without difficulty. Pt states he will need to be set up with a PCP which will be done prior to pt leaving the hospital.  Pt has no DME @ home and is not on home oxygen. Pt is currently on 4 liters home oxygen. Pt was slightly dyspneic during our conversation. Pt uses [facility] on [address] of preference. Pt was recently laid off from 1 [facility] [employer] staff so does not need a work excuse. Plan: 1.Echo  2. Diuresis  3. Blood pressure control  4. Cardiology consult  5. Nephrology consult  6. Heart Failure Rounds  7. Continue to assess pt for discharge needs such as home health and rehab. 8.Assess pt for home oxygen needs. 9.Provide pt with CHF information and education. 10.I will continue to follow pt for discharge needs. 11.Prior to discharge,pt will need to be set up with a new PCP. He prefers a [facility] group near [facility].   12.Per physician's note,ACP was addressed with pt plus I reviewed his choices and have lynda ed a note in ACP section.   Amanda Dillard

## 2020-05-07 NOTE — CONSULTS
Consult dictated    Uncontrolled HTN, likely essential. Will check ARR and catecholamines/metanephrines. Unlikely BOLA but if renal asymmetry, consider further eval. Titrate meds. Eventually may benefit from ARB due to proteinuria. Elevated Cr, may be acute and chronic components. UA + for protein which could be secondary to HTN or a primary glomerular process that is contributing to HTN. Control BP, check renal US and seros. No need for RRT at this point.

## 2020-05-07 NOTE — CDMP QUERY
Pt admitted with acute pulmonary edema and  possible new acute CHF. Pt noted to have tachypnea and hypoxia while on supplemental oxygen. If possible, please document in the progress notes and d/c summary if you are evaluating and / or treating any of the following: 
 
=> Acute respiratory failure with Hypoxia POA, now resolved  
=> Acute respiratory failure with hypercapnia 
      => Acute respiratory failure ruled out  
=> Other, please specify 
=> Clinically unable to determine The medical record reflects the following: 
   Risk Factors:  40 yo M admitted with acute pulmonary edema and possible new acute CHF Clinical Indicators: c/o dyspnea and SOB  RR 29  O2 sat 94% while on 4L  supplemental oxygen Via NC  
   ED note states \"Tachypnea and respiratory distress \" \" reports difficulty breathing, shortness of breath, worsened with exertion\"\" Treatment: 2- 4 L O2 via NC Thank you for your time [facility] RN/BSN, CCDS Desk:   [phone] Other:  [phone] Acute Respiratory failure indicators include: 
Respirations >28 Air Hunger Use of accessory muscles of respiration Inability to speak in full sentences Cyanosis Pulse Ox <90% on Room Air or <95% on O2 
PH <7.35 or >7.45 
PO2<60 mmHg    (or 10 mm below COPD pt baseline) PCO2 >50mm Hg (or 10 mm below COPD pt baseline)

## 2020-05-07 NOTE — PROGRESS NOTES
Spiritual Care Assessment/Progress Note  [address]      NAME: Joelle Turner      MRN: [id number]  AGE: 39 y.o. SEX: male  Gnosticism Affiliation: Mu-ism   Language: English     5/7/2020     Total Time (in minutes): 5     Spiritual Assessment begun in SF 3 PROG CARE TELE 1 through conversation with:         [x]Patient        [] Family    [] Friend(s)        Reason for Consult: [facility]     Spiritual beliefs: (Please include comment if needed)     [x] Identifies with a flor tradition: Mu-ism        [] Supported by a flor community:            [] Claims no spiritual orientation:           [] Seeking spiritual identity:                [] Adheres to an individual form of spirituality:           [] Not able to assess:                           Identified resources for coping:      [] Prayer                               [] Music                  [] Guided Imagery     [x] Family/friends                 [] Pet visits     [] Devotional reading                         [] Unknown     [] Other:                                               Interventions offered during this visit: (See comments for more details)    Patient Interventions: Communion [employer]), Initial/Spiritual assessment, patient floor, Prayer (actual), Prayer (assurance of)           Plan of Care:     [x] Support spiritual and/or cultural needs    [] Support AMD and/or advance care planning process      [] Support grieving process   [] Coordinate Rites and/or Rituals    [] Coordination with community clergy   [] No spiritual needs identified at this time   [] Detailed Plan of Care below (See Comments)  [] Make referral to Music Therapy  [] Make referral to Pet Therapy     [] Make referral to Addiction services  [] Make referral to [facility]  [] Make referral to Spiritual Care Partner  [] No future visits requested        [] Follow up visits as needed     Comments: Mr. Mere Hernandez was sound asleep.   Unable to offer communion. Prayer offered for his well-being.     UNRULY Patrick, RN, ACSW, LCSW   Page:  [phone]

## 2020-05-07 NOTE — CONSULTS
Chauncey Bridges MD. [facility]      Patient: Leslie Anaya  : 1975    Today's Date: 2020    HISTORY OF PRESENT ILLNESS:     History of Present Illness:    Leslie Anaya is a 39 y.o. male presenting for shortness of breath. States it had been worsening over the course of 4-5 days prior to admission. States he was having inreased swelling in his legs especially the day before admission and was unable to walk a few steps without feeling short of breath. Denies fever, chills, chest pain, shortness of breath when laying down, scrotal swelling, abd pain, nausea, vomiting, dysuria, hematuria. Of notes states he does not have a PCP and has not been taking any medication. States he was seen at [facility] about a year ago and referred to the ER for a similar presentation and high blood pressure. Was told to follow up with a PCP but never did. Has not been compliant with his diet in the last week. States he was recently laid off from work.      PAST MEDICAL HISTORY:     Past Medical History:   Diagnosis Date    HLD (hyperlipidemia)     Hypertension      Past Surgical History:   Procedure Laterality Date    HX OTHER SURGICAL      testicular surgery in childhood     MEDICATIONS:     Current Facility-Administered Medications   Medication Dose Route Frequency Provider Last Rate Last Dose    sodium chloride (NS) flush 5-40 mL  5-40 mL IntraVENous Q8H Chandra SANCHEZ MD   10 mL at 20 0516    sodium chloride (NS) flush 5-40 mL  5-40 mL IntraVENous PRN Chandra Mesa MD        acetaminophen (TYLENOL) tablet 650 mg  650 mg Oral Q4H PRN Chandra Mesa MD        ondansetron [facility]) injection 4 mg  4 mg IntraVENous Q6H PRN Chandra Mesa MD        enoxaparin (LOVENOX) injection 40 mg  40 mg SubCUTAneous Q24H Chandra SANCHEZ MD   40 mg at 20 0158    furosemide (LASIX) injection 20 mg  20 mg IntraVENous Q12H Chandra Mesa MD   20 mg at 20 1561    aspirin chewable tablet 81 mg  81 mg Oral DAILY Ying Weinberg MD   81 mg at 05/07/20 0805    labetaloL (NORMODYNE;TRANDATE) 20 mg/4 mL (5 mg/mL) injection 20 mg  20 mg IntraVENous Q6H PRN Ying SANCHEZ MD   20 mg at 05/07/20 0346    amLODIPine (NORVASC) tablet 10 mg  10 mg Oral DAILY Phyllis Ha MD        hydrALAZINE (APRESOLINE) 20 mg/mL injection 20 mg  20 mg IntraVENous Q6H PRN Ying Weinberg MD        cloNIDine HCL (CATAPRES) tablet 0.1 mg  0.1 mg Oral Q8H PRN Ying Weinberg MD   0.1 mg at 05/07/20 0208     Allergies   Allergen Reactions    Benadryl [Diphenhydramine Hcl] Hives     SOCIAL HISTORY:     Social History     Tobacco Use    Smoking status: Never Smoker    Smokeless tobacco: Never Used   Substance Use Topics    Alcohol use: Yes     Frequency: Monthly or less     Drinks per session: 1 or 2     Binge frequency: Never    Drug use: Never     FAMILY HISTORY:     Family History   Problem Relation Age of Onset    Asthma Sister     Asthma Brother      REVIEW OF SYMPTOMS:     Review of Symptoms:  Constitutional: Negative for fever, chills  HEENT: Negative for nosebleeds, tinnitus, and vision changes. Respiratory: Negative for cough, wheezing  Cardiovascular: Negative for orthopnea, claudication, syncope, and PND. Gastrointestinal: Negative for abdominal pain, diarrhea, melena. Genitourinary: Negative for dysuria  Musculoskeletal: Negative for myalgias. Skin: Negative for rash  Heme: No problems bleeding. Neurological: Negative for speech change and focal weakness. PHYSICAL EXAM:     Physical Exam:  Visit Vitals  BP (!) 144/94 (BP 1 Location: Left arm, BP Patient Position: At rest)   Pulse 89   Temp 98.4 °F (36.9 °C)   Resp 20   Ht 5' 8\" (1.727 m)   Wt 255 lb (115.7 kg)   SpO2 98%   BMI 38.77 kg/m²     Patient appears generally well, mood and affect are appropriate and pleasant. HEENT:  Hearing intact, non-icteric, normocephalic, atraumatic. Neck Exam: Supple, No JVD or carotid bruits. Lung Exam: Good air movement BL, decreased breath sounds in lower lobes BL  Cardiac Exam: Regular rate and rhythm with no murmur  Abdomen: Soft, non-tender, normal bowel sounds. No bruits or masses. Extremities: Moves all ext well. 3+ lower ext edema BL. Vascular: 2+ dorsalis pedis pulses bilaterally. Psych: Appropriate affect  Neuro - Grossly intact    LABS / OTHER STUDIES:     Recent Results (from the past 24 hour(s))   EKG, 12 LEAD, INITIAL    Collection Time: 05/06/20  8:45 PM   Result Value Ref Range    Ventricular Rate 116 BPM    Atrial Rate 116 BPM    P-R Interval 130 ms    QRS Duration 72 ms    Q-T Interval 310 ms    QTC Calculation (Bezet) 430 ms    Calculated P Axis 42 degrees    Calculated R Axis 8 degrees    Calculated T Axis 158 degrees    Diagnosis       Sinus tachycardia  Possible Left atrial enlargement  Left ventricular hypertrophy with repolarization abnormality  Abnormal ECG  When compared with ECG of 11-OCT-2017 20:57,  No significant change was found     PROTHROMBIN TIME + INR    Collection Time: 05/06/20  8:54 PM   Result Value Ref Range    INR 1.0 0.9 - 1.1      Prothrombin time 10.4 9.0 - 11.1 sec   CBC WITH AUTOMATED DIFF    Collection Time: 05/06/20  8:56 PM   Result Value Ref Range    WBC 9.6 4.1 - 11.1 K/uL    RBC 5.22 4.10 - 5.70 M/uL    HGB 14.6 12.1 - 17.0 g/dL    HCT 43.5 36.6 - 50.3 %    MCV 83.3 80.0 - 99.0 FL    MCH 28.0 26.0 - 34.0 PG    MCHC 33.6 30.0 - 36.5 g/dL    RDW 13.3 11.5 - 14.5 %    PLATELET 074 755 - 092 K/uL    NRBC 0.0 0  WBC    ABSOLUTE NRBC 0.00 0.00 - 0.01 K/uL    NEUTROPHILS 72 32 - 75 %    LYMPHOCYTES 17 12 - 49 %    MONOCYTES 7 5 - 13 %    EOSINOPHILS 2 0 - 7 %    BASOPHILS 1 0 - 1 %    IMMATURE GRANULOCYTES 1 (H) 0.0 - 0.5 %    ABS. NEUTROPHILS 6.9 1.8 - 8.0 K/UL    ABS. LYMPHOCYTES 1.6 0.8 - 3.5 K/UL    ABS. MONOCYTES 0.7 0.0 - 1.0 K/UL    ABS. EOSINOPHILS 0.2 0.0 - 0.4 K/UL    ABS.  BASOPHILS 0.1 0.0 - 0.1 K/UL    ABS. IMM. GRANS. 0.1 (H) 0.00 - 0.04 K/UL    DF AUTOMATED     METABOLIC PANEL, COMPREHENSIVE    Collection Time: 05/06/20  9:13 PM   Result Value Ref Range    Sodium 137 136 - 145 mmol/L    Potassium 3.5 3.5 - 5.1 mmol/L    Chloride 100 97 - 108 mmol/L    CO2 27 21 - 32 mmol/L    Anion gap 10 5 - 15 mmol/L    Glucose 122 (H) 65 - 100 mg/dL    BUN 23 (H) 6 - 20 MG/DL    Creatinine 2.15 (H) 0.70 - 1.30 MG/DL    BUN/Creatinine ratio 11 (L) 12 - 20      GFR est AA 41 (L) >60 ml/min/1.73m2    GFR est non-AA 34 (L) >60 ml/min/1.73m2    Calcium 8.9 8.5 - 10.1 MG/DL    Bilirubin, total 0.6 0.2 - 1.0 MG/DL    ALT (SGPT) 51 12 - 78 U/L    AST (SGOT) 33 15 - 37 U/L    Alk. phosphatase 86 45 - 117 U/L    Protein, total 8.1 6.4 - 8.2 g/dL    Albumin 3.5 3.5 - 5.0 g/dL    Globulin 4.6 (H) 2.0 - 4.0 g/dL    A-G Ratio 0.8 (L) 1.1 - 2.2     NT-PRO BNP    Collection Time: 05/06/20  9:13 PM   Result Value Ref Range    NT pro-BNP 6,911 (H) <125 PG/ML   TROPONIN I    Collection Time: 05/06/20  9:13 PM   Result Value Ref Range    Troponin-I, Qt. 0.17 (H) <0.05 ng/mL   MAGNESIUM    Collection Time: 05/06/20  9:13 PM   Result Value Ref Range    Magnesium 2.2 1.6 - 2.4 mg/dL   SAMPLES BEING HELD    Collection Time: 05/06/20  9:13 PM   Result Value Ref Range    SAMPLES BEING HELD [email or web address]     COMMENT        Add-on orders for these samples will be processed based on acceptable specimen integrity and analyte stability, which may vary by analyte.    URINALYSIS W/MICROSCOPIC    Collection Time: 05/07/20 12:54 AM   Result Value Ref Range    Color YELLOW/STRAW      Appearance CLEAR CLEAR      Specific gravity 1.007 1.003 - 1.030      pH (UA) 7.0 5.0 - 8.0      Protein 300 (A) NEG mg/dL    Glucose Negative NEG mg/dL    Ketone Negative NEG mg/dL    Bilirubin Negative NEG      Blood Negative NEG      Urobilinogen 0.2 0.2 - 1.0 EU/dL    Nitrites Negative NEG      Leukocyte Esterase Negative NEG      WBC 0-4 0 - 4 /hpf    RBC 0-5 0 - 5 /hpf    Epithelial cells FEW FEW /lpf    Bacteria Negative NEG /hpf   URINE CULTURE HOLD SAMPLE    Collection Time: 05/07/20 12:54 AM   Result Value Ref Range    Urine culture hold        Urine on hold in Microbiology dept for 2 days. If unpreserved urine is submitted, it cannot be used for addtional testing after 24 hours, recollection will be required. METABOLIC PANEL, COMPREHENSIVE    Collection Time: 05/07/20  2:12 AM   Result Value Ref Range    Sodium 138 136 - 145 mmol/L    Potassium 3.4 (L) 3.5 - 5.1 mmol/L    Chloride 98 97 - 108 mmol/L    CO2 31 21 - 32 mmol/L    Anion gap 9 5 - 15 mmol/L    Glucose 146 (H) 65 - 100 mg/dL    BUN 24 (H) 6 - 20 MG/DL    Creatinine 2.21 (H) 0.70 - 1.30 MG/DL    BUN/Creatinine ratio 11 (L) 12 - 20      GFR est AA 40 (L) >60 ml/min/1.73m2    GFR est non-AA 33 (L) >60 ml/min/1.73m2    Calcium 8.9 8.5 - 10.1 MG/DL    Bilirubin, total 0.8 0.2 - 1.0 MG/DL    ALT (SGPT) 49 12 - 78 U/L    AST (SGOT) 30 15 - 37 U/L    Alk. phosphatase 89 45 - 117 U/L    Protein, total 7.5 6.4 - 8.2 g/dL    Albumin 3.6 3.5 - 5.0 g/dL    Globulin 3.9 2.0 - 4.0 g/dL    A-G Ratio 0.9 (L) 1.1 - 2.2     CBC WITH AUTOMATED DIFF    Collection Time: 05/07/20  2:12 AM   Result Value Ref Range    WBC 12.6 (H) 4.1 - 11.1 K/uL    RBC 5.27 4. 10 - 5.70 M/uL    HGB 14.6 12.1 - 17.0 g/dL    HCT 44.0 36.6 - 50.3 %    MCV 83.5 80.0 - 99.0 FL    MCH 27.7 26.0 - 34.0 PG    MCHC 33.2 30.0 - 36.5 g/dL    RDW 13.2 11.5 - 14.5 %    PLATELET 847 055 - 884 K/uL    MPV 10.3 8.9 - 12.9 FL    NRBC 0.0 0  WBC    ABSOLUTE NRBC 0.00 0.00 - 0.01 K/uL    NEUTROPHILS 84 (H) 32 - 75 %    LYMPHOCYTES 8 (L) 12 - 49 %    MONOCYTES 6 5 - 13 %    EOSINOPHILS 1 0 - 7 %    BASOPHILS 0 0 - 1 %    IMMATURE GRANULOCYTES 1 (H) 0.0 - 0.5 %    ABS. NEUTROPHILS 10.6 (H) 1.8 - 8.0 K/UL    ABS. LYMPHOCYTES 1.0 0.8 - 3.5 K/UL    ABS. MONOCYTES 0.8 0.0 - 1.0 K/UL    ABS. EOSINOPHILS 0.1 0.0 - 0.4 K/UL    ABS.  BASOPHILS 0.1 0.0 - 0.1 K/UL ABS. IMM. GRANS. 0.1 (H) 0.00 - 0.04 K/UL    DF AUTOMATED     TSH 3RD GENERATION    Collection Time: 05/07/20  2:12 AM   Result Value Ref Range    TSH 2.37 0.36 - 3.74 uIU/mL   LIPID PANEL    Collection Time: 05/07/20  2:12 AM   Result Value Ref Range    LIPID PROFILE          Cholesterol, total 290 (H) <200 MG/DL    Triglyceride 190 (H) <150 MG/DL    HDL Cholesterol 68 MG/DL    LDL, calculated 184 (H) 0 - 100 MG/DL    VLDL, calculated 38 MG/DL    CHOL/HDL Ratio 4.3 0.0 - 5.0     TROPONIN I    Collection Time: 05/07/20  2:12 AM   Result Value Ref Range    Troponin-I, Qt. 0.16 (H) <0.05 ng/mL     CARDIAC DIAGNOSTICS:     Cardiac Evaluation Includes:    1. Lipid panel  Lab Results   Component Value Date/Time    Cholesterol, total 290 (H) 05/07/2020 02:12 AM    HDL Cholesterol 68 05/07/2020 02:12 AM    LDL, calculated 184 (H) 05/07/2020 02:12 AM    Triglyceride 190 (H) 05/07/2020 02:12 AM    CHOL/HDL Ratio 4.3 05/07/2020 02:12 AM     ASSESSMENT AND PLAN:     Assessment and Plan:    1. New onset pulmonary edema - POA BNP 6911, 3+ LE edema on exam, CXR with mild moderate interstitial pulm edema, could be new onset heart failure  - Continue IV Lasix 20mg BID, strict I/O, daily weights, follow up echo     2. Hypertensive Emergency - POA trop 0.17 now 0.16, Cr 2.15 (BL 1.12, proteinuria on UA, likely 2/2 noncompliance, poor diet   - Continue Norvasc 10mg daily  - Clonidine 0.1mg q8h PRN, Labetalol 20mg q6h PRN, Hydralazine 20mg q6h PRN  - Nephro checking metanephrines / freeman-renin ratio, possible glomerular disease    3. Type II MI - likely 2/2 demand ischemia  - Continue to follow troponin, stress test outpatient?  - Recommend adding moderate intensity statin Lipitor 20mg daily  - Check A1c     4.  SURJIT - could be new baseline or acute kidney injury in setting of hypertensive emergency  - Nephro following and checking for possible glomerular disease, appreciate reccs    Resident: Earnest Lamoille, DO Ritika Goel, MD, [address]  [address] [facility] [address] [facility], [address]  Alejandra, [address] [place] [address]  Ph: [phone]   Ph [phone]        CARDIOLOGY ATTENDING  Patient personally seen and examined. All the elements of history and examination were personally performed. Assessment and plan was discussed and agree as written above    He comes in with hypertensive emergency but is doing better. Has no complaints and BP is ~ 144/84. Hrt RRR with no murmur; lungs with basilar crackles, + LE edema    Will diurese for CHF - echo pending   BP fair on amlodipine -- may need to add other agents later. Workup for secondary HTN is pending.    Mild troponin elevation from strain - check an echo and consider outpatient stress test       Chauncey Bridges MD, [facility] [place]

## 2020-05-07 NOTE — PROGRESS NOTES
Anthony Zavala [facility] [address]  [address] [facility], [place], [address] [place] [address]  [phone]      Medical Progress Note      NAME: Edwar Freeman   :  1975  MRM:  [id number]    Date of service: 2020  6:57 AM       Assessment and Plan:   1. Acute pulmonary edema. Probable new-onset congestive heart failure with anasarca and markedly elevated BNP level. likely due to uncontrolled BP. Oxygen, IV diuresis, daily weight, input/output monitoring. optimization of blood pressure control. Check echocardiogram. Cardiology consult. 2.  Type II MI - likely 2/2 demand ischemia. Mildly elevated troponin. Probable demand supply mismatch due to above. Cardiology consult     3. Hypertensive urgency. Noncompliance. Started on amlodipine. PRN labetalol. Now better controlled     4. Acute kidney injury. Unclear baseline. Check renal ultrasound to evaluate for obstructive uropathy. Close monitoring of kidney function while being diuresed. Nephrology consult appreciated. Workup in progress    5. Hyperlipidemia. Started on atorvastatin     6. Morbid obesity with a body mass index greater than 40. Therapeutic lifestyle changes counseling done. 7.  Hypokalemia. replete     8. Acute hypoxic respiratory failure. Likely secondary to #1. Continue supplement O2 and diuresis                  Subjective:     Chief Complaint[de-identified] Patient was seen and examined as a follow up for CHF. Chart was reviewed. SOB is better     ROS:  (bold if positive, if negative)    Tolerating PT  Tolerating Diet        Objective:     Last 24hrs VS reviewed since prior progress note.  Most recent are:    Visit Vitals  /90 (BP 1 Location: Left arm, BP Patient Position: At rest;Head of bed elevated (Comment degrees))   Pulse 89   Temp 98.8 °F (37.1 °C)   Resp 22   Ht 5' 8\" (1.727 m)   Wt 115.7 kg (255 lb)   SpO2 94%   BMI 38.77 kg/m²     SpO2 Readings from Last 6 Encounters:   20 94%   10/11/17 93%    O2 Flow Rate (L/min): 4 l/min       Intake/Output Summary (Last 24 hours) at 5/7/2020 0657  Last data filed at 5/7/2020 0401  Gross per 24 hour   Intake 180 ml   Output 1775 ml   Net -1595 ml        Physical Exam:    Gen:  Well-developed, well-nourished, in no acute distress  HEENT:  Pink conjunctivae, PERRL, hearing intact to voice, moist mucous membranes  Neck:  Supple, without masses, thyroid non-tender  Resp:  No accessory muscle use,  rales at the bases  Card:  No murmurs, normal S1, S2 without thrills, bruits or peripheral edema  Abd:  Soft, non-tender, non-distended, normoactive bowel sounds are present, no palpable organomegaly and no detectable hernias  Lymph:  No cervical or inguinal adenopathy  Musc:  No cyanosis or clubbing  Skin:  No rashes or ulcers, skin turgor is good  Neuro:  Cranial nerves are grossly intact, no focal motor weakness, follows commands appropriately  Psych:  Good insight, oriented to person, place and time, alert  __________________________________________________________________  Medications Reviewed: (see below)  Medications:     Current Facility-Administered Medications   Medication Dose Route Frequency    sodium chloride (NS) flush 5-40 mL  5-40 mL IntraVENous Q8H    sodium chloride (NS) flush 5-40 mL  5-40 mL IntraVENous PRN    acetaminophen (TYLENOL) tablet 650 mg  650 mg Oral Q4H PRN    ondansetron (ZOFRAN) injection 4 mg  4 mg IntraVENous Q6H PRN    enoxaparin (LOVENOX) injection 40 mg  40 mg SubCUTAneous Q24H    furosemide (LASIX) injection 20 mg  20 mg IntraVENous Q12H    aspirin chewable tablet 81 mg  81 mg Oral DAILY    labetaloL (NORMODYNE;TRANDATE) 20 mg/4 mL (5 mg/mL) injection 20 mg  20 mg IntraVENous Q6H PRN    potassium chloride SR (KLOR-CON 10) tablet 40 mEq  40 mEq Oral NOW    hydrALAZINE (APRESOLINE) 20 mg/mL injection 20 mg  20 mg IntraVENous Q6H PRN    cloNIDine HCL (CATAPRES) tablet 0.1 mg  0.1 mg Oral Q8H PRN        Lab Data Reviewed: (see below)  Lab Review:     Recent Labs     05/07/20 0212 05/06/20 2056   WBC 12.6* 9.6   HGB 14.6 14.6   HCT 44.0 43.5    151     Recent Labs     05/07/20 0212 05/06/20 2113 05/06/20 2054    137  --    K 3.4* 3.5  --    CL 98 100  --    CO2 31 27  --    * 122*  --    BUN 24* 23*  --    CREA 2.21* 2.15*  --    CA 8.9 8.9  --    MG  --  2.2  --    ALB 3.6 3.5  --    TBILI 0.8 0.6  --    SGOT 30 33  --    ALT 49 51  --    INR  --   --  1.0     No results found for: GLUCPOC  No results for input(s): PH, PCO2, PO2, HCO3, FIO2 in the last 72 hours. Recent Labs     05/06/20 2054   INR 1.0     All Micro Results     Procedure Component Value Units Date/Time    URINE CULTURE HOLD SAMPLE [[id number]] Collected:  05/07/20 0054    Order Status:  Completed Specimen:  Urine from Serum Updated:  05/07/20 0104     Urine culture hold       Urine on hold in Microbiology dept for 2 days. If unpreserved urine is submitted, it cannot be used for addtional testing after 24 hours, recollection will be required. I have reviewed notes of prior 24hr. Other pertinent lab:      Total time spent with patient: [address] discussed with: Patient, Nursing Staff and >50% of time spent in counseling and coordination of care    Discussed:  Care Plan    Prophylaxis:  Lovenox    Disposition:  Home w/Family           ___________________________________________________    Attending Physician: Nina Florence MD

## 2020-05-07 NOTE — PROGRESS NOTES
Admission Medication Reconciliation:    Comments/Recommendations:  -Removed amlodipine from PTA med list as this was not a med patient was taking prior to admission - this was from 10/11/2017 and was prescribed by [facility] ED provider and patient did not follow through with a PCP to continue  -Preferred pharmacy updated    Medications added: None  Medications removed: Amlodipine (see above)  Medications changed: None    Information obtained from: RxQuery, chart review, patient    Significant PMH/Disease States:   History reviewed. No pertinent past medical history.     Chief Complaint for this Admission:    Chief Complaint   Patient presents with    Shortness of Breath    Leg Swelling     Bilateral     Cough       Allergies:  Benadryl [diphenhydramine hcl]    Prior to Admission Medications:   None     Thank you,  ISHMAEL Thomas

## 2020-05-08 LAB
ALBUMIN SERPL ELPH-MCNC: 3.1 G/DL (ref 2.9–4.4)
ALBUMIN/GLOB SERPL: 1 {RATIO} (ref 0.7–1.7)
ALPHA1 GLOB SERPL ELPH-MCNC: 0.2 G/DL (ref 0–0.4)
ALPHA2 GLOB SERPL ELPH-MCNC: 0.6 G/DL (ref 0.4–1)
ANA SER QL: NEGATIVE
ANION GAP SERPL CALC-SCNC: 5 MMOL/L (ref 5–15)
B-GLOBULIN SERPL ELPH-MCNC: 1.1 G/DL (ref 0.7–1.3)
BASOPHILS # BLD: 0.1 K/UL (ref 0–0.1)
BASOPHILS NFR BLD: 1 % (ref 0–1)
BUN SERPL-MCNC: 27 MG/DL (ref 6–20)
BUN/CREAT SERPL: 12 (ref 12–20)
C-ANCA TITR SER IF: NORMAL TITER
C3 SERPL-MCNC: 128 MG/DL (ref 82–167)
C4 SERPL-MCNC: 29 MG/DL (ref 14–44)
CALCIUM SERPL-MCNC: 8.5 MG/DL (ref 8.5–10.1)
CHLORIDE SERPL-SCNC: 101 MMOL/L (ref 97–108)
CO2 SERPL-SCNC: 32 MMOL/L (ref 21–32)
CREAT SERPL-MCNC: 2.26 MG/DL (ref 0.7–1.3)
DIFFERENTIAL METHOD BLD: ABNORMAL
EOSINOPHIL # BLD: 0.3 K/UL (ref 0–0.4)
EOSINOPHIL NFR BLD: 3 % (ref 0–7)
ERYTHROCYTE [DISTWIDTH] IN BLOOD BY AUTOMATED COUNT: 13.6 % (ref 11.5–14.5)
GAMMA GLOB SERPL ELPH-MCNC: 1.1 G/DL (ref 0.4–1.8)
GBM IGG SER-ACNC: 5 UNITS (ref 0–20)
GLOBULIN SER CALC-MCNC: 3 G/DL (ref 2.2–3.9)
GLUCOSE SERPL-MCNC: 109 MG/DL (ref 65–100)
HCT VFR BLD AUTO: 40.5 % (ref 36.6–50.3)
HGB BLD-MCNC: 13.3 G/DL (ref 12.1–17)
IMM GRANULOCYTES # BLD AUTO: 0 K/UL (ref 0–0.04)
IMM GRANULOCYTES NFR BLD AUTO: 0 % (ref 0–0.5)
LYMPHOCYTES # BLD: 2.1 K/UL (ref 0.8–3.5)
LYMPHOCYTES NFR BLD: 20 % (ref 12–49)
M PROTEIN SERPL ELPH-MCNC: NORMAL G/DL
MCH RBC QN AUTO: 27.9 PG (ref 26–34)
MCHC RBC AUTO-ENTMCNC: 32.8 G/DL (ref 30–36.5)
MCV RBC AUTO: 85.1 FL (ref 80–99)
MONOCYTES # BLD: 1.1 K/UL (ref 0–1)
MONOCYTES NFR BLD: 11 % (ref 5–13)
NEUTS SEG # BLD: 6.9 K/UL (ref 1.8–8)
NEUTS SEG NFR BLD: 65 % (ref 32–75)
NRBC # BLD: 0 K/UL (ref 0–0.01)
NRBC BLD-RTO: 0 PER 100 WBC
P-ANCA ATYPICAL TITR SER IF: NORMAL TITER
P-ANCA TITR SER IF: NORMAL TITER
PLATELET # BLD AUTO: 200 K/UL (ref 150–400)
PMV BLD AUTO: 10.3 FL (ref 8.9–12.9)
POTASSIUM SERPL-SCNC: 3.3 MMOL/L (ref 3.5–5.1)
PROT SERPL-MCNC: 6.1 G/DL (ref 6–8.5)
RBC # BLD AUTO: 4.76 M/UL (ref 4.1–5.7)
SODIUM SERPL-SCNC: 138 MMOL/L (ref 136–145)
WBC # BLD AUTO: 10.4 K/UL (ref 4.1–11.1)

## 2020-05-08 PROCEDURE — 85025 COMPLETE CBC W/AUTO DIFF WBC: CPT

## 2020-05-08 PROCEDURE — 74011250637 HC RX REV CODE- 250/637: Performed by: INTERNAL MEDICINE

## 2020-05-08 PROCEDURE — 65660000000 HC RM CCU STEPDOWN

## 2020-05-08 PROCEDURE — 94760 N-INVAS EAR/PLS OXIMETRY 1: CPT

## 2020-05-08 PROCEDURE — 77010033678 HC OXYGEN DAILY

## 2020-05-08 PROCEDURE — 80048 BASIC METABOLIC PNL TOTAL CA: CPT

## 2020-05-08 PROCEDURE — 36415 COLL VENOUS BLD VENIPUNCTURE: CPT

## 2020-05-08 PROCEDURE — 74011250636 HC RX REV CODE- 250/636: Performed by: INTERNAL MEDICINE

## 2020-05-08 PROCEDURE — 74011250637 HC RX REV CODE- 250/637: Performed by: NURSE PRACTITIONER

## 2020-05-08 RX ORDER — LOSARTAN POTASSIUM 50 MG/1
100 TABLET ORAL DAILY
Status: DISCONTINUED | OUTPATIENT
Start: 2020-05-08 | End: 2020-05-09 | Stop reason: HOSPADM

## 2020-05-08 RX ORDER — HYDRALAZINE HYDROCHLORIDE 25 MG/1
25 TABLET, FILM COATED ORAL 3 TIMES DAILY
Status: DISCONTINUED | OUTPATIENT
Start: 2020-05-08 | End: 2020-05-09 | Stop reason: HOSPADM

## 2020-05-08 RX ORDER — METOPROLOL TARTRATE 50 MG/1
50 TABLET ORAL EVERY 12 HOURS
Status: DISCONTINUED | OUTPATIENT
Start: 2020-05-08 | End: 2020-05-09 | Stop reason: HOSPADM

## 2020-05-08 RX ORDER — POTASSIUM CHLORIDE 750 MG/1
40 TABLET, FILM COATED, EXTENDED RELEASE ORAL
Status: COMPLETED | OUTPATIENT
Start: 2020-05-08 | End: 2020-05-08

## 2020-05-08 RX ADMIN — HYDRALAZINE HYDROCHLORIDE 25 MG: 25 TABLET, FILM COATED ORAL at 09:00

## 2020-05-08 RX ADMIN — Medication 10 ML: at 21:41

## 2020-05-08 RX ADMIN — FUROSEMIDE 20 MG: 10 INJECTION, SOLUTION INTRAMUSCULAR; INTRAVENOUS at 09:01

## 2020-05-08 RX ADMIN — AMLODIPINE BESYLATE 10 MG: 5 TABLET ORAL at 08:59

## 2020-05-08 RX ADMIN — CLONIDINE HYDROCHLORIDE 0.1 MG: 0.1 TABLET ORAL at 09:00

## 2020-05-08 RX ADMIN — Medication 10 ML: at 05:43

## 2020-05-08 RX ADMIN — ATORVASTATIN CALCIUM 40 MG: 20 TABLET, FILM COATED ORAL at 09:00

## 2020-05-08 RX ADMIN — HYDRALAZINE HYDROCHLORIDE 25 MG: 25 TABLET, FILM COATED ORAL at 17:15

## 2020-05-08 RX ADMIN — FUROSEMIDE 20 MG: 10 INJECTION, SOLUTION INTRAMUSCULAR; INTRAVENOUS at 21:38

## 2020-05-08 RX ADMIN — POTASSIUM CHLORIDE 40 MEQ: 750 TABLET, FILM COATED, EXTENDED RELEASE ORAL at 09:00

## 2020-05-08 RX ADMIN — METOPROLOL TARTRATE 50 MG: 50 TABLET, FILM COATED ORAL at 17:15

## 2020-05-08 RX ADMIN — ENOXAPARIN SODIUM 40 MG: 40 INJECTION SUBCUTANEOUS at 01:19

## 2020-05-08 RX ADMIN — LABETALOL HYDROCHLORIDE 20 MG: 5 INJECTION, SOLUTION INTRAVENOUS at 04:00

## 2020-05-08 RX ADMIN — METOPROLOL TARTRATE 25 MG: 25 TABLET, FILM COATED ORAL at 05:42

## 2020-05-08 RX ADMIN — ASPIRIN 81 MG 81 MG: 81 TABLET ORAL at 09:00

## 2020-05-08 RX ADMIN — Medication 10 ML: at 09:02

## 2020-05-08 RX ADMIN — LOSARTAN POTASSIUM 100 MG: 50 TABLET ORAL at 12:36

## 2020-05-08 NOTE — PROGRESS NOTES
Care Management follow up    Patient admitted for dyspnea on exertion, edema, MI, CHF, SURJIT. Hx HTN. RUR score 9%/low risk    Current status  Met with patient at bedside. States he is feeling a little better today. Continues to require medication management of  Hypertension. Transition of Care Plan  1. Continue to monitor patient status and response to treatment. 2. Blood pressure still unstable, medication management. 3. Possible discharge tomorrow, home with family assist.  4. [place] will transport [place] at [place]. 5. Patient does not currently have a PCP but has provider list, instructed to identify a PCP to assist with post hospital follow up. 6. CM to follow.     Surinder Webb, RN, MSN/Care manager

## 2020-05-08 NOTE — PROGRESS NOTES
Cardiology Progress Note    [facility]     NAME:  Darline Harding   :   1975   MRN:   [id number]   Assessment/Plan/Discussion:Cardiology Attending:     Patient seen on the day of progress note and examined  and agree with Advance Practice Provider (ALO, NP,PA)  assessment and plans. Darline Harding is a 39 y.o. male   In good spirits  Went over bp treatment and CKD  Needs to be compliant  Explained EF is normal but \"heart failure\" is diastolic dysfunction due to volume overload with HCVD and CKD  Will defer fu of bp meds to Renal  On exam no rales, murmur   Will see back PRN, thanks  Alonzo Daniels MD          Assessment/Plan:   1. New onset pulmonary edema:     2. New onset:  diastolic CHF:   - cont lasix 20 mg BID  - daily weights  - low na diet  -strict I/O.   - ECHO with preserved EF, diastolic dysfunction      3. Hypertensive Emergency -   - on Norvasc  - in BB to 50 mg BID  - add po hydralazine      4. Type II MI - likely 2/2 demand ischemia  - troponins flat  - will plan for stress as OP        5 . Elevated serum creatinine, per nephrology: likely a significant chronic component and possibly a superimposed acute component due to vascular congestion and uncontrolled hypertension. Urinalysis is positive for protein which may be secondary to hypertension or a primary glomerular process that could be contributing to hypertension.                          Subjective:   Darline Harding is a 39 y.o. male presenting for shortness of breath. States it had been worsening over the course of 4-5 days prior to admission. States he was having inreased swelling in his legs especially the day before admission and was unable to walk a few steps without feeling short of breath.  Denies fever, chills, chest pain, shortness of breath when laying down, scrotal swelling, abd pain, nausea, vomiting, dysuria, hematuria.     Of notes states he does not have a PCP and has not been taking any medication. States he was seen at [facility] about a year ago and referred to the ER for a similar presentation and high blood pressure. Was told to follow up with a PCP but never did. Has not been compliant with his diet in the last week. States he was recently laid off from work. Cardiac ROS: Patient denies any exertional chest pain, dyspnea, palpitations, syncope, orthopnea, edema or paroxysmal nocturnal dyspnea. Previous Cardiac Eval  20   ECHO ADULT COMPLETE 2020    Narrative · Normal cavity size and systolic function (ejection fraction normal). Severe concentric hypertrophy. Estimated left ventricular ejection   fraction is 60 - 65%. No regional wall motion abnormality noted. Left   ventricular diastolic dysfunction. Signed by: Cherylyn Leventhal, MD         Review of Systems: No nausea, indigestion, vomiting, pain, cough, sputum. No bleeding. Taking po. Appetite ok. Objective:     Visit Vitals  BP (!) 155/110 (BP 1 Location: Left arm, BP Patient Position: Sitting; At rest)   Pulse 82   Temp 98 °F (36.7 °C)   Resp 20   Ht 5' 8\" (1.727 m)   Wt 255 lb (115.7 kg)   SpO2 93%   BMI 38.77 kg/m²    O2 Flow Rate (L/min): 2 l/min O2 Device: Nasal cannula    Temp (24hrs), Av °F (36.7 °C), Min:97.4 °F (36.3 °C), Max:98.7 °F (37.1 °C)      No intake/output data recorded.  1901 -  0700  In: 1440 [P.O.:1440]  Out: 6658 [Urine:3925]  TELE: SR    General: AAOx3 cooperative, no acute distress. HEENT: Atraumatic. Pink and moist.  Anicteric sclerae. Neck : Supple  Lungs: CTA bilaterally. No wheezing/rhonchi/rales. Heart: Regular rhythm, no murmur, no rubs, no gallops. No JVD. Abdomen: obese, Soft, non-distended, non-tender. + Bowel sounds. Extremities: + 2  LE edema, no clubbing, no cyanosis. Neurologic: Grossly intact. Alert and oriented X 3. No acute neurological distress. Psych: Good insight. Not anxious or agitated.         Care Plan discussed with: Comments   Patient x    Family      RN x    Care Manager                    Consultant:          Data Review:     No lab exists for component: ITNL   Recent Labs     05/07/20 0212 05/06/20 2113   TROIQ 0.16* 0.17*     Recent Labs     05/08/20  0152 05/07/20 0212 05/06/20 2113 05/06/20 2056    138 137  --    K 3.3* 3.4* 3.5  --     98 100  --    CO2 32 31 27  --    BUN 27* 24* 23*  --    CREA 2.26* 2.21* 2.15*  --    * 146* 122*  --    MG  --   --  2.2  --    ALB  --  3.6 3.5  --    WBC 10.4 12.6*  --  9.6   HGB 13.3 14.6  --  14.6   HCT 40.5 44.0  --  43.5    209  --  151     Recent Labs     05/06/20 2054   INR 1.0   PTP 10.4       Medications reviewed  Current Facility-Administered Medications   Medication Dose Route Frequency    metoprolol tartrate (LOPRESSOR) tablet 50 mg  50 mg Oral Q12H    potassium chloride SR (KLOR-CON 10) tablet 40 mEq  40 mEq Oral NOW    sodium chloride (NS) flush 5-40 mL  5-40 mL IntraVENous Q8H    sodium chloride (NS) flush 5-40 mL  5-40 mL IntraVENous PRN    acetaminophen (TYLENOL) tablet 650 mg  650 mg Oral Q4H PRN    ondansetron (ZOFRAN) injection 4 mg  4 mg IntraVENous Q6H PRN    enoxaparin (LOVENOX) injection 40 mg  40 mg SubCUTAneous Q24H    furosemide (LASIX) injection 20 mg  20 mg IntraVENous Q12H    aspirin chewable tablet 81 mg  81 mg Oral DAILY    labetaloL (NORMODYNE;TRANDATE) 20 mg/4 mL (5 mg/mL) injection 20 mg  20 mg IntraVENous Q6H PRN    amLODIPine (NORVASC) tablet 10 mg  10 mg Oral DAILY    atorvastatin (LIPITOR) tablet 40 mg  40 mg Oral DAILY    hydrALAZINE (APRESOLINE) 20 mg/mL injection 20 mg  20 mg IntraVENous Q6H PRN    cloNIDine HCL (CATAPRES) tablet 0.1 mg  0.1 mg Oral Q8H PRN         Sherrill Barnes NP

## 2020-05-08 NOTE — PROGRESS NOTES
0750-  Bedside and Verbal shift change report given to GM RN (oncoming nurse) by AW RN (offgoing nurse). Report included the following information SBAR, Kardex and Recent Results. .    1930-  Bedside and Verbal shift change report given to 101Harrison Union (oncoming nurse) by [facility]  (offgoing nurse). Report included the following information SBAR, Kardex and Recent Results.

## 2020-05-08 NOTE — PROGRESS NOTES
[address] [facility]  YOB: 1975          Assessment & Plan:   Elevated Cr, likely CKD +/- SURJIT  Subnephrotic proteinuria  Uncontrolled HTN  Hypokalemia  HFpEF  LVH    Rec: Add ARB  Follow up serologies, ARR, plasma catechol/metanephrines  If d/c over weekend, needs to f/u with us in 2 wk       Subjective:   CC: f/u HTN, CKD  HPI: Creat stable. P/C ratio 2.3. US shows echogenic kidneys. Seros p. BP still uncontrolled. CHF improving, echo shows lvh and HFpEF   ROS: Less sob, no n/v  Current Facility-Administered Medications   Medication Dose Route Frequency    metoprolol tartrate (LOPRESSOR) tablet 50 mg  50 mg Oral Q12H    hydrALAZINE (APRESOLINE) tablet 25 mg  25 mg Oral TID    losartan (COZAAR) tablet 100 mg  100 mg Oral DAILY    sodium chloride (NS) flush 5-40 mL  5-40 mL IntraVENous Q8H    sodium chloride (NS) flush 5-40 mL  5-40 mL IntraVENous PRN    acetaminophen (TYLENOL) tablet 650 mg  650 mg Oral Q4H PRN    ondansetron (ZOFRAN) injection 4 mg  4 mg IntraVENous Q6H PRN    enoxaparin (LOVENOX) injection 40 mg  40 mg SubCUTAneous Q24H    furosemide (LASIX) injection 20 mg  20 mg IntraVENous Q12H    aspirin chewable tablet 81 mg  81 mg Oral DAILY    labetaloL (NORMODYNE;TRANDATE) 20 mg/4 mL (5 mg/mL) injection 20 mg  20 mg IntraVENous Q6H PRN    amLODIPine (NORVASC) tablet 10 mg  10 mg Oral DAILY    atorvastatin (LIPITOR) tablet 40 mg  40 mg Oral DAILY    hydrALAZINE (APRESOLINE) 20 mg/mL injection 20 mg  20 mg IntraVENous Q6H PRN    cloNIDine HCL (CATAPRES) tablet 0.1 mg  0.1 mg Oral Q8H PRN          Objective:     Vitals:  Blood pressure (!) 170/100, pulse 86, temperature 98.2 °F (36.8 °C), resp. rate 18, height 5' 8\" (1.727 m), weight 115.7 kg (255 lb), SpO2 97 %. Temp (24hrs), Av °F (36.7 °C), Min:97.4 °F (36.3 °C), Max:98.7 °F (37.1 °C)      Intake and Output:  No intake/output data recorded.   1901 - 05/08 0700  In: 1440 [P.O.:1440]  Out: 3925 [Urine:3925]    Physical Exam:               GENERAL ASSESSMENT: NAD  CHEST: CTA  HEART: S1S2  ABDOMEN: Soft,NT  EXTREMITY: min EDEMA        ECG/rhythm:    Data Review      No results for input(s): TNIPOC in the last 72 hours. No lab exists for component: ITNL   Recent Labs     05/07/20 0212 05/06/20 2113   TROIQ 0.16* 0.17*     Recent Labs     05/08/20  0152 05/07/20 0212 05/06/20 2113 05/06/20 2056    138 137  --    K 3.3* 3.4* 3.5  --     98 100  --    CO2 32 31 27  --    BUN 27* 24* 23*  --    CREA 2.26* 2.21* 2.15*  --    * 146* 122*  --    MG  --   --  2.2  --    CA 8.5 8.9 8.9  --    ALB  --  3.6 3.5  --    WBC 10.4 12.6*  --  9.6   HGB 13.3 14.6  --  14.6   HCT 40.5 44.0  --  43.5    209  --  151      Recent Labs     05/06/20 2054   INR 1.0   PTP 10.4     Needs: urine analysis, urine sodium, protein and creatinine  Lab Results   Component Value Date/Time    Creatinine, urine 99.00 05/07/2020 09:36 AM           : Sydnee Davis MD  5/8/2020        [facility]:  [email or web address]  Patricia Camera office:  [address] [place] [address], [address] [place] [address] [address]  [place], [address] [facility]  Phone: [phone]  Fax :     [phone]    [place] office:  [address] [facility]  Sin Perez  Phone - [phone]  Fax - [phone]

## 2020-05-08 NOTE — PROGRESS NOTES
Problem: Patient Education: Go to Patient Education Activity  Goal: Patient/Family Education  Outcome: Progressing Towards Goal     Problem: Falls - Risk of  Goal: *Absence of Falls  Description: Document Justus Lopez Fall Risk and appropriate interventions in the flowsheet.   Outcome: Progressing Towards Goal  Note: Fall Risk Interventions:            Medication Interventions: Bed/chair exit alarm, Patient to call before getting OOB, Teach patient to arise slowly

## 2020-05-08 NOTE — PROGRESS NOTES
Anthony Zavala [facility] [address]  [address] [facility], [place], [address] [place] [address]  [phone]      Medical Progress Note      NAME: Navjot Barbosa   :  1975  MRM:  [id number]    Date of service: 2020  6:57 AM       Assessment and Plan:   1. Acute pulmonary edema. Probable new-onset diastolic congestive heart failure due to uncontrolled BP. Markedly elevated BNP level. cont IV diuresis, daily weight, input/output monitoring. optimization of blood pressure control. Echocardiogram: . Cardiology consult. 2.  Type II MI - likely 2/2 demand ischemia. Mildly elevated troponin. Cardiology consult appreciate    3. Hypertensive urgency. Noncompliance. Started on amlodipine and added BB. PRN labetalol. 4.   Acute kidney injury vs CKD. Unclear baseline. Renal ultrasound without obstruction. Close monitoring of kidney function while being diuresed. Nephrology consult appreciated. Workup in progress including metanephrines and catecholamines     5. Hyperlipidemia. Started on atorvastatin     6. Morbid obesity with a body mass index greater than 40. Therapeutic lifestyle changes counseling done. 7.  Hypokalemia. replete     8. Acute hypoxic respiratory failure. Likely secondary to #1. supplement O2 as needed                  Subjective:     Chief Complaint[de-identified] Patient was seen and examined as a follow up for CHF. Chart was reviewed. feels better. Denies SOB      ROS:  (bold if positive, if negative)    Tolerating PT  Tolerating Diet        Objective:     Last 24hrs VS reviewed since prior progress note. Most recent are:    Visit Vitals  BP (!) 155/110 (BP 1 Location: Left arm, BP Patient Position: Sitting; At rest)   Pulse 90   Temp 98 °F (36.7 °C)   Resp 20   Ht 5' 8\" (1.727 m)   Wt 115.7 kg (255 lb)   SpO2 93%   BMI 38.77 kg/m²     SpO2 Readings from Last 6 Encounters:   20 93%   10/11/17 93%    O2 Flow Rate (L/min): 2 l/min       Intake/Output Summary (Last 24 hours) at 2020 0701  Last data filed at 5/8/2020 0505  Gross per 24 hour   Intake 1260 ml   Output 2150 ml   Net -890 ml        Physical Exam:    Gen:  Well-developed, well-nourished, in no acute distress  HEENT:  Pink conjunctivae, PERRL, hearing intact to voice, moist mucous membranes  Neck:  Supple, without masses, thyroid non-tender  Resp:  No accessory muscle use,  rales at the bases  Card:  No murmurs, normal S1, S2 without thrills, bruits or peripheral edema  Abd:  Soft, non-tender, non-distended, normoactive bowel sounds are present, no palpable organomegaly and no detectable hernias  Lymph:  No cervical or inguinal adenopathy  Musc:  No cyanosis or clubbing  Skin:  No rashes or ulcers, skin turgor is good  Neuro:  Cranial nerves are grossly intact, no focal motor weakness, follows commands appropriately  Psych:  Good insight, oriented to person, place and time, alert  __________________________________________________________________  Medications Reviewed: (see below)  Medications:     Current Facility-Administered Medications   Medication Dose Route Frequency    metoprolol tartrate (LOPRESSOR) tablet 50 mg  50 mg Oral Q12H    potassium chloride SR (KLOR-CON 10) tablet 40 mEq  40 mEq Oral NOW    sodium chloride (NS) flush 5-40 mL  5-40 mL IntraVENous Q8H    sodium chloride (NS) flush 5-40 mL  5-40 mL IntraVENous PRN    acetaminophen (TYLENOL) tablet 650 mg  650 mg Oral Q4H PRN    ondansetron (ZOFRAN) injection 4 mg  4 mg IntraVENous Q6H PRN    enoxaparin (LOVENOX) injection 40 mg  40 mg SubCUTAneous Q24H    furosemide (LASIX) injection 20 mg  20 mg IntraVENous Q12H    aspirin chewable tablet 81 mg  81 mg Oral DAILY    labetaloL (NORMODYNE;TRANDATE) 20 mg/4 mL (5 mg/mL) injection 20 mg  20 mg IntraVENous Q6H PRN    amLODIPine (NORVASC) tablet 10 mg  10 mg Oral DAILY    atorvastatin (LIPITOR) tablet 40 mg  40 mg Oral DAILY    hydrALAZINE (APRESOLINE) 20 mg/mL injection 20 mg  20 mg IntraVENous Q6H PRN    cloNIDine HCL (CATAPRES) tablet 0.1 mg  0.1 mg Oral Q8H PRN        Lab Data Reviewed: (see below)  Lab Review:     Recent Labs     05/08/20 0152 05/07/20 0212 05/06/20 2056   WBC 10.4 12.6* 9.6   HGB 13.3 14.6 14.6   HCT 40.5 44.0 43.5    209 151     Recent Labs     05/08/20  0152 05/07/20 0212 05/06/20 2113 05/06/20 2054    138 137  --    K 3.3* 3.4* 3.5  --     98 100  --    CO2 32 31 27  --    * 146* 122*  --    BUN 27* 24* 23*  --    CREA 2.26* 2.21* 2.15*  --    CA 8.5 8.9 8.9  --    MG  --   --  2.2  --    ALB  --  3.6 3.5  --    TBILI  --  0.8 0.6  --    SGOT  --  30 33  --    ALT  --  49 51  --    INR  --   --   --  1.0     No results found for: GLUCPOC  No results for input(s): PH, PCO2, PO2, HCO3, FIO2 in the last 72 hours. Recent Labs     05/06/20 2054   INR 1.0     All Micro Results     Procedure Component Value Units Date/Time    URINE CULTURE HOLD SAMPLE [[id number]] Collected:  05/07/20 0054    Order Status:  Completed Specimen:  Urine from Serum Updated:  05/07/20 0104     Urine culture hold       Urine on hold in Microbiology dept for 2 days. If unpreserved urine is submitted, it cannot be used for addtional testing after 24 hours, recollection will be required. I have reviewed notes of prior 24hr. Other pertinent lab:      Total time spent with patient: [address] discussed with: Patient, Nursing Staff and >50% of time spent in counseling and coordination of care    Discussed:  Care Plan    Prophylaxis:  Lovenox    Disposition:  Home w/Family           ___________________________________________________    Attending Physician: Cm Lipscomb MD

## 2020-05-08 NOTE — PROGRESS NOTES
Bedside and Verbal shift change report given to Kandi Posada RN (oncoming nurse) by Pee Alejandro RN (offgoing nurse). Report included the following information SBAR, Kardex, ED Summary, Intake/Output, Recent Results, Med Rec Status and Cardiac Rhythm NSR/ST. Patient Vitals for the past 4 hrs:   Temp Pulse Resp BP SpO2   05/08/20 2137 -- 84 -- 114/73 --   05/08/20 2037 97.8 °F (36.6 °C) 79 14 106/67 93 %       2037: Pt alert, oriented, denies pain. Pt reported feeling lightheaded within the last hour. Noted B/P dropped from  156/90 to 106/67 after scheduled  B/P med given. 2140: Pt refused scheduled Apresoline at this time. Pt B/P 114/73. Bedside and Verbal shift change report given to Andrea Alejo RN (oncoming nurse) by Umm Jurado (offgoing nurse). Report included the following information SBAR, Kardex, ED Summary, Intake/Output, Recent Results, Med Rec Status and Cardiac Rhythm NSR .

## 2020-05-08 NOTE — NURSE NAVIGATOR
Met with patient at bedside. Introduced self and role of HF NN. Assessed patient's current understanding of uncontrolled HTN, HF, and kidney disease and hospitalization. Patient had good understanding. Reviewed HF symptoms using HF magnet. Patient noted he had all the symptoms mentioned on the magnet. Reviewed diastolic HF disease process in setting of uncontrolled HTN and kidney insufficiency. Reviewed HF zones, rationale for daily weight with parameters and how to be accurate with daily weight. Patient does have an electronic scale at home but will have to purchase batteries. Reviewed medication safety, diuretic and BP medications. Discussed the chronic nature of HTN and need to take medications for BP long term. Patient voiced intent to establish with Cardiology, Nephrology, and PCP. Given list of BS Providers to choose from. Patient plans to use weekly pill box and set reminder alarms on his cell phone to improved medication adherence. Reviewed relationship of sodium in food to fluid retention and symptoms. Patient has just ordered 2 Low Sodium [address] which he showed me on his phone. Reviewed 2000 mg or less sodium diet recommendation with how to read a nutrition label. Discussed high sodium foods in hidden sources of sodium, alternative seasonings to sodium, how to keep sodium as low as possible if eating out. Reviewed general guidelines for fluid intake with what constitutes a fluid. Patient drinks a large quantity of milk-he pointed to the 1 liter container at bedside to indicate how much he drinks at one sitting. Reviewed the high calorie content and also the high sodium content of that size serving of milk. (about 100 mg sodium per 1 cup milk). Patient voiced intent to cut that back. Encouraged patient to make some small changes and continue to use reputable resources to educate himself on heart healthy diet.   Patient given information for AAHFN, AHA,  and HFSA websites for additional patient education. Patient assessed for indicators for sleep apnea. He notes his wife has observed \"irregular\" breathing pattern while sleeping. STOP-BANG score 5. Reviewed benefits of assessment and treatment of sleep apnea and recommended he discuss this with providers at follow up. Patient given Living with HF Education Book, HF magnet, and HF calendar.

## 2020-05-09 VITALS
RESPIRATION RATE: 17 BRPM | DIASTOLIC BLOOD PRESSURE: 100 MMHG | OXYGEN SATURATION: 95 % | SYSTOLIC BLOOD PRESSURE: 126 MMHG | TEMPERATURE: 97.8 F | BODY MASS INDEX: 39.43 KG/M2 | WEIGHT: 260.14 LBS | HEART RATE: 79 BPM | HEIGHT: 68 IN

## 2020-05-09 PROBLEM — Z91.199 NONCOMPLIANCE: Status: ACTIVE | Noted: 2020-05-09

## 2020-05-09 PROBLEM — I10 HTN (HYPERTENSION): Status: ACTIVE | Noted: 2020-05-09

## 2020-05-09 LAB
ANION GAP SERPL CALC-SCNC: 5 MMOL/L (ref 5–15)
BUN SERPL-MCNC: 36 MG/DL (ref 6–20)
BUN/CREAT SERPL: 15 (ref 12–20)
CALCIUM SERPL-MCNC: 8.3 MG/DL (ref 8.5–10.1)
CHLORIDE SERPL-SCNC: 102 MMOL/L (ref 97–108)
CO2 SERPL-SCNC: 29 MMOL/L (ref 21–32)
CREAT SERPL-MCNC: 2.47 MG/DL (ref 0.7–1.3)
GLUCOSE SERPL-MCNC: 123 MG/DL (ref 65–100)
POTASSIUM SERPL-SCNC: 3.8 MMOL/L (ref 3.5–5.1)
SODIUM SERPL-SCNC: 136 MMOL/L (ref 136–145)

## 2020-05-09 PROCEDURE — 36415 COLL VENOUS BLD VENIPUNCTURE: CPT

## 2020-05-09 PROCEDURE — 74011250637 HC RX REV CODE- 250/637: Performed by: INTERNAL MEDICINE

## 2020-05-09 PROCEDURE — 74011250636 HC RX REV CODE- 250/636: Performed by: INTERNAL MEDICINE

## 2020-05-09 PROCEDURE — 74011250637 HC RX REV CODE- 250/637: Performed by: NURSE PRACTITIONER

## 2020-05-09 PROCEDURE — 80048 BASIC METABOLIC PNL TOTAL CA: CPT

## 2020-05-09 PROCEDURE — 94760 N-INVAS EAR/PLS OXIMETRY 1: CPT

## 2020-05-09 RX ORDER — METOPROLOL TARTRATE 50 MG/1
50 TABLET ORAL EVERY 12 HOURS
Qty: 60 TAB | Refills: 1 | Status: SHIPPED | OUTPATIENT
Start: 2020-05-09 | End: 2020-05-19 | Stop reason: SDUPTHER

## 2020-05-09 RX ORDER — GUAIFENESIN 100 MG/5ML
81 LIQUID (ML) ORAL DAILY
Qty: 30 TAB | Refills: 0 | Status: SHIPPED | OUTPATIENT
Start: 2020-05-10 | End: 2020-06-12 | Stop reason: SDUPTHER

## 2020-05-09 RX ORDER — HYDRALAZINE HYDROCHLORIDE 25 MG/1
25 TABLET, FILM COATED ORAL 3 TIMES DAILY
Qty: 90 TAB | Refills: 1 | Status: SHIPPED | OUTPATIENT
Start: 2020-05-09 | End: 2020-05-19 | Stop reason: SDUPTHER

## 2020-05-09 RX ORDER — ATORVASTATIN CALCIUM 40 MG/1
40 TABLET, FILM COATED ORAL DAILY
Qty: 30 TAB | Refills: 1 | Status: SHIPPED | OUTPATIENT
Start: 2020-05-10 | End: 2020-05-19 | Stop reason: SDUPTHER

## 2020-05-09 RX ORDER — LOSARTAN POTASSIUM 100 MG/1
100 TABLET ORAL DAILY
Qty: 30 TAB | Refills: 1 | Status: SHIPPED | OUTPATIENT
Start: 2020-05-10 | End: 2020-05-19 | Stop reason: SDUPTHER

## 2020-05-09 RX ORDER — AMLODIPINE BESYLATE 10 MG/1
10 TABLET ORAL DAILY
Qty: 30 TAB | Refills: 1 | Status: SHIPPED | OUTPATIENT
Start: 2020-05-10 | End: 2020-05-19 | Stop reason: SDUPTHER

## 2020-05-09 RX ADMIN — HYDRALAZINE HYDROCHLORIDE 25 MG: 25 TABLET, FILM COATED ORAL at 16:25

## 2020-05-09 RX ADMIN — Medication 10 ML: at 05:38

## 2020-05-09 RX ADMIN — ASPIRIN 81 MG 81 MG: 81 TABLET ORAL at 08:14

## 2020-05-09 RX ADMIN — METOPROLOL TARTRATE 50 MG: 50 TABLET, FILM COATED ORAL at 05:42

## 2020-05-09 RX ADMIN — HYDRALAZINE HYDROCHLORIDE 25 MG: 25 TABLET, FILM COATED ORAL at 08:14

## 2020-05-09 RX ADMIN — LOSARTAN POTASSIUM 100 MG: 50 TABLET ORAL at 08:15

## 2020-05-09 RX ADMIN — ENOXAPARIN SODIUM 40 MG: 40 INJECTION SUBCUTANEOUS at 00:35

## 2020-05-09 RX ADMIN — ATORVASTATIN CALCIUM 40 MG: 20 TABLET, FILM COATED ORAL at 08:15

## 2020-05-09 RX ADMIN — Medication 10 ML: at 16:20

## 2020-05-09 RX ADMIN — AMLODIPINE BESYLATE 10 MG: 5 TABLET ORAL at 08:15

## 2020-05-09 NOTE — PROGRESS NOTES
1330-  Bedside and Verbal shift change report given to ELIZABETH RN  (oncoming nurse) by Artie Mcdaniel RN (offgoing nurse). Report included the following information SBAR, Kardex and Recent Results. .. I have reviewed discharge instructions with the patient. The patient verbalized understanding. .      0125-  Discharged the pt via . Raj

## 2020-05-09 NOTE — PROGRESS NOTES
Bedside and Verbal shift change report given to Nithya Shoemaker (oncoming nurse) by Karolina Toth RN (offgoing nurse). Report included the following information SBAR, Kardex, Intake/Output, Accordion and Recent Results. Bedside and Verbal shift change report given to Spencer Evertonlaurence Brendan (oncoming nurse) by Sophia Rico RN  (offgoing nurse). Report included the following information SBAR, Kardex, Intake/Output, Accordion and Recent Results.

## 2020-05-09 NOTE — PROGRESS NOTES
Anthony Zavala kari [place] [address]  [address] [facility], [address] [place] [address]  [phone]      Medical Progress Note      NAME: Sandra Meek   :  1975  MRM:  [id number]    Date of service: 2020  6:57 AM       Assessment and Plan:   1. Acute pulmonary edema. Probable new-onset diastolic congestive heart failure due to uncontrolled BP. Markedly elevated BNP level. cont IV diuresis, daily weight, input/output monitoring. optimization of blood pressure control. Echocardiogram: EF 65% with Severe concentric hypertrophy. Cardiology consult appreciated. 2.  Type II MI - likely 2/2 demand ischemia. Mildly elevated troponin. Cardiology consult appreciate    3. Hypertensive urgency. Noncompliance. Started on amlodipine, losartan, hydralazine and metoprolol. PRN labetalol. 4.   Acute kidney injury vs CKD. Unclear baseline. Renal ultrasound without obstruction. Close monitoring of kidney function while being diuresed. Nephrology consult appreciated. Workup in progress including metanephrines and catecholamines. Stopped diuretic. Needs to FU with nephrology     5. Hyperlipidemia. Started on atorvastatin     6. Morbid obesity with a body mass index greater than 40. Therapeutic lifestyle changes counseling done. 7.  Hypokalemia. replete     8. Acute hypoxic respiratory failure. Likely secondary to #1. Resolved. Subjective:     Chief Complaint[de-identified] Patient was seen and examined as a follow up for CHF. Chart was reviewed. feels better. Denies SOB      ROS:  (bold if positive, if negative)    Tolerating PT  Tolerating Diet        Objective:     Last 24hrs VS reviewed since prior progress note.  Most recent are:    Visit Vitals  BP (!) 147/98   Pulse 86   Temp 98.2 °F (36.8 °C)   Resp 16   Ht 5' 8\" (1.727 m)   Wt 118 kg (260 lb 2.3 oz)   SpO2 94%   BMI 39.55 kg/m²     SpO2 Readings from Last 6 Encounters:   20 94%   10/11/17 93%    O2 Flow Rate (L/min): 1 l/min Intake/Output Summary (Last 24 hours) at 5/9/2020 8452  Last data filed at 5/9/2020 0173  Gross per 24 hour   Intake 1970 ml   Output 975 ml   Net 995 ml        Physical Exam:    Gen:  Well-developed, well-nourished, in no acute distress  HEENT:  Pink conjunctivae, PERRL, hearing intact to voice, moist mucous membranes  Neck:  Supple, without masses, thyroid non-tender  Resp:  No accessory muscle use,  rales at the bases  Card:  No murmurs, normal S1, S2 without thrills, bruits or peripheral edema  Abd:  Soft, non-tender, non-distended, normoactive bowel sounds are present, no palpable organomegaly and no detectable hernias  Lymph:  No cervical or inguinal adenopathy  Musc:  No cyanosis or clubbing  Skin:  No rashes or ulcers, skin turgor is good  Neuro:  Cranial nerves are grossly intact, no focal motor weakness, follows commands appropriately  Psych:  Good insight, oriented to person, place and time, alert  __________________________________________________________________  Medications Reviewed: (see below)  Medications:     Current Facility-Administered Medications   Medication Dose Route Frequency    metoprolol tartrate (LOPRESSOR) tablet 50 mg  50 mg Oral Q12H    hydrALAZINE (APRESOLINE) tablet 25 mg  25 mg Oral TID    losartan (COZAAR) tablet 100 mg  100 mg Oral DAILY    sodium chloride (NS) flush 5-40 mL  5-40 mL IntraVENous Q8H    sodium chloride (NS) flush 5-40 mL  5-40 mL IntraVENous PRN    acetaminophen (TYLENOL) tablet 650 mg  650 mg Oral Q4H PRN    ondansetron (ZOFRAN) injection 4 mg  4 mg IntraVENous Q6H PRN    enoxaparin (LOVENOX) injection 40 mg  40 mg SubCUTAneous Q24H    furosemide (LASIX) injection 20 mg  20 mg IntraVENous Q12H    aspirin chewable tablet 81 mg  81 mg Oral DAILY    labetaloL (NORMODYNE;TRANDATE) 20 mg/4 mL (5 mg/mL) injection 20 mg  20 mg IntraVENous Q6H PRN    amLODIPine (NORVASC) tablet 10 mg  10 mg Oral DAILY    atorvastatin (LIPITOR) tablet 40 mg  40 mg Oral DAILY    hydrALAZINE (APRESOLINE) 20 mg/mL injection 20 mg  20 mg IntraVENous Q6H PRN    cloNIDine HCL (CATAPRES) tablet 0.1 mg  0.1 mg Oral Q8H PRN        Lab Data Reviewed: (see below)  Lab Review:     Recent Labs     05/08/20 0152 05/07/20 0212 05/06/20 2056   WBC 10.4 12.6* 9.6   HGB 13.3 14.6 14.6   HCT 40.5 44.0 43.5    209 151     Recent Labs     05/09/20  0332 05/08/20 0152 05/07/20 0212 05/06/20 2113 05/06/20 2054    138 138 137   < >  --    K 3.8 3.3* 3.4* 3.5   < >  --     101 98 100   < >  --    CO2 29 32 31 27   < >  --    * 109* 146* 122*   < >  --    BUN 36* 27* 24* 23*   < >  --    CREA 2.47* 2.26* 2.21* 2.15*   < >  --    CA 8.3* 8.5 8.9 8.9   < >  --    MG  --   --   --  2.2  --   --    ALB  --   --  3.6 3.5  --   --    TBILI  --   --  0.8 0.6  --   --    SGOT  --   --  30 33  --   --    ALT  --   --  49 51  --   --    INR  --   --   --   --   --  1.0    < > = values in this interval not displayed. No results found for: GLUCPOC  No results for input(s): PH, PCO2, PO2, HCO3, FIO2 in the last 72 hours. Recent Labs     05/06/20 2054   INR 1.0     All Micro Results     Procedure Component Value Units Date/Time    URINE CULTURE HOLD SAMPLE [[id number]] Collected:  05/07/20 0054    Order Status:  Completed Specimen:  Urine from Serum Updated:  05/07/20 0104     Urine culture hold       Urine on hold in Microbiology dept for 2 days. If unpreserved urine is submitted, it cannot be used for addtional testing after 24 hours, recollection will be required. I have reviewed notes of prior 24hr. Other pertinent lab:      Total time spent with patient: [address] discussed with: Patient, Nursing Staff and >50% of time spent in counseling and coordination of care    Discussed:  Care Plan    Prophylaxis:  Lovenox    Disposition:  Home w/Family           ___________________________________________________    Attending Physician: Libertad Ndiaye Sylvain Wu MD

## 2020-05-09 NOTE — DISCHARGE INSTRUCTIONS
ACUTE DIAGNOSES:  Dyspnea on exertion [R06.00]    CHRONIC MEDICAL DIAGNOSES:  Problem List as of 5/9/2020 Never Reviewed          Codes Class Noted - Resolved    HTN (hypertension) ICD-10-CM: I10  ICD-9-CM: 401.9  5/9/2020 - Present        Noncompliance ICD-10-CM: Z91.19  ICD-9-CM: V15.81  5/9/2020 - Present        CHF (congestive heart failure) (HCC) ICD-10-CM: I50.9  ICD-9-CM: 428.0  Unknown - Present        Dyspnea on exertion ICD-10-CM: R06.00  ICD-9-CM: 786.09  5/6/2020 - Present              DISCHARGE MEDICATIONS:          · It is important that you take the medication exactly as they are prescribed. · Keep your medication in the bottles provided by the pharmacist and keep a list of the medication names, dosages, and times to be taken in your wallet. · Do not take other medications without consulting your doctor. DIET:  Cardiac Diet    ACTIVITY: Activity as tolerated    ADDITIONAL INFORMATION: If you experience any of the following symptoms then please call your primary care physician or return to the emergency room if you cannot get hold of your doctor: Fever, chills, nausea, vomiting, diarrhea, change in mentation, falling, bleeding, shortness of breath. FOLLOW UP CARE:  Primary care physician. you are to call and set up an appointment to see them in 2 weeks. Follow-up with nephrology, Ezequiel Parish in 2 weeks    Information obtained by :  I understand that if any problems occur once I am at home I am to contact my physician. I understand and acknowledge receipt of the instructions indicated above.                                                                                                                                            Physician's or R.N.'s Signature                                                                  Date/Time                                                                                                                                              Patient or Representative Signature                                                          Date/Time

## 2020-05-09 NOTE — PROGRESS NOTES
Problem: Falls - Risk of  Goal: *Absence of Falls  Description: Document Lacy Rolle Fall Risk and appropriate interventions in the flowsheet.   Outcome: Progressing Towards Goal  Note: Fall Risk Interventions:            Medication Interventions: Bed/chair exit alarm, Evaluate medications/consider consulting pharmacy, Teach patient to arise slowly         History of Falls Interventions: Door open when patient unattended, Evaluate medications/consider consulting pharmacy         Problem: Patient Education: Go to Patient Education Activity  Goal: Patient/Family Education  Outcome: Progressing Towards Goal

## 2020-05-09 NOTE — DISCHARGE SUMMARY
Hospitalist Discharge Summary     Patient ID:    Val Little  [id number]  39 y.o.  1975    Admit date of service: 5/6/2020    Discharge date of service: 5/9/2020    Admission Diagnoses: Dyspnea on exertion [R06.00]    Chronic Diagnoses:    Problem List as of 5/9/2020 Never Reviewed          Codes Class Noted - Resolved    HTN (hypertension) ICD-10-CM: I10  ICD-9-CM: 401.9  5/9/2020 - Present        Noncompliance ICD-10-CM: Z91.19  ICD-9-CM: V15.81  5/9/2020 - Present        [facility]) [facility] ICD-10-CM: I50.9  ICD-9-CM: 428.0  Unknown - Present        Dyspnea on exertion ICD-10-CM: R06.00  ICD-9-CM: 786.09  5/6/2020 - Present              Discharge Medications:   Current Discharge Medication List      START taking these medications    Details   amLODIPine (NORVASC) 10 mg tablet Take 1 Tab by mouth daily. Qty: 30 Tab, Refills: 1      aspirin 81 mg chewable tablet Take 1 Tab by mouth daily. Qty: 30 Tab, Refills: 0      atorvastatin (LIPITOR) 40 mg tablet Take 1 Tab by mouth daily. Qty: 30 Tab, Refills: 1      hydrALAZINE (APRESOLINE) 25 mg tablet Take 1 Tab by mouth three (3) times daily. Qty: 90 Tab, Refills: 1      losartan (COZAAR) 100 mg tablet Take 1 Tab by mouth daily. Qty: 30 Tab, Refills: 1      metoprolol tartrate (LOPRESSOR) 50 mg tablet Take 1 Tab by mouth every twelve (12) hours. Qty: 60 Tab, Refills: 1             Follow up Care:    1. None in 1-2 weeks  2. Nephrology in 2 weeks    Diet:  Cardiac Diet    Disposition:  Home. Advanced Directive:    Discharge Exam:  See today's note.     CONSULTATIONS: Cardiology and Nephrology    Significant Diagnostic Studies:   Recent Labs     05/08/20  0152 05/07/20 0212   WBC 10.4 12.6*   HGB 13.3 14.6   HCT 40.5 44.0    209     Recent Labs     05/09/20  0332 05/08/20  0152 05/07/20  0212 05/06/20  2113    138 138 137   K 3.8 3.3* 3.4* 3.5    101 98 100   CO2 29 32 31 27   BUN 36* 27* 24* 23*   CREA 2.47* 2.26* 2.21* 2.15*   * 109* 146* 122*   CA 8.3* 8.5 8.9 8.9   MG  --   --   --  2.2     Recent Labs     05/07/20  0936 05/07/20  0212 05/06/20 2113   SGOT  --  30 33   ALT  --  49 51   AP  --  89 86   TBILI  --  0.8 0.6   TP 6.1 7.5 8.1   ALB  --  3.6 3.5   GLOB  --  3.9 4.6*     Recent Labs     05/06/20 2054   INR 1.0   PTP 10.4      No results for input(s): FE, TIBC, PSAT, FERR in the last 72 hours. No results for input(s): PH, PCO2, PO2 in the last 72 hours. No results for input(s): CPK, CKMB in the last 72 hours. No lab exists for component: TROPONINI  No results found for: [address]:   1.  Acute pulmonary edema. Probable new-onset diastolic congestive heart failure due to uncontrolled BP. Markedly elevated BNP level. Treated with  IV diuresis and responded well. [facility] optimization of blood pressure control. Echocardiogram: EF 65% with Severe concentric hypertrophy. Cardiology consult appreciated. no plan for intervention     2. Type II MI - likely 2/2 demand ischemia. Mildly elevated troponin. Cardiology consult appreciate     3. Hypertensive urgency. Noncompliance. Started on amlodipine, losartan, hydralazine and metoprolol. FU with nephrology     4.    CKD. Unclear baseline. Renal ultrasound without obstruction. Close monitoring of kidney function while being diuresed. Nephrology consult appreciated. Workup in progress including metanephrines and catecholamines. Stopped diuretic. Needs to FU with nephrology      5. Hyperlipidemia. Started on atorvastatin      6.  Morbid obesity with a body mass index greater than 40.  Therapeutic lifestyle changes counseling done.     7. Hypokalemia. replete      8. Acute hypoxic respiratory failure. Likely secondary to #1. Resolved.            Discharged in improved condition.     Spent 35 minutes    Signed:  Almaz Whitney MD  5/9/2020  12:25 PM

## 2020-05-10 LAB
METANEPH FREE SERPL-MCNC: 59 PG/ML (ref 0–62)
NORMETANEPHRINE SERPL-MCNC: 135 PG/ML (ref 0–145)

## 2020-05-11 ENCOUNTER — PATIENT OUTREACH (OUTPATIENT)
Dept: CARDIOLOGY CLINIC | Age: 45
End: 2020-05-11

## 2020-05-11 LAB
ALDOST SERPL-MCNC: 22 NG/DL (ref 0–30)
RENIN PLAS-CCNC: 7.42 NG/ML/HR (ref 0.17–5.38)
SPECIMEN SOURCE: ABNORMAL

## 2020-05-12 LAB
DOPAMINE SERPL-MCNC: NORMAL PG/ML (ref 0–48)
EPINEPH PLAS-MCNC: NORMAL PG/ML (ref 0–62)
NOREPINEPH PLAS-MCNC: NORMAL PG/ML (ref 0–874)

## 2020-05-12 NOTE — PROGRESS NOTES
COVID-19 Screening Initial Follow-up Note    Patient contacted regarding [id number]. Care Transition Nurse/ Ambulatory Care Manager has attempted to contact the patient by telephone to perform post discharge assessment. CTN/ACM has been unable to reach patient after 2 unsuccessful attempts. Resolving outreach episode.

## 2020-05-19 ENCOUNTER — OFFICE VISIT (OUTPATIENT)
Dept: CARDIOLOGY CLINIC | Age: 45
End: 2020-05-19

## 2020-05-19 VITALS
HEIGHT: 68 IN | SYSTOLIC BLOOD PRESSURE: 128 MMHG | HEART RATE: 75 BPM | DIASTOLIC BLOOD PRESSURE: 78 MMHG | OXYGEN SATURATION: 96 % | BODY MASS INDEX: 39.86 KG/M2 | WEIGHT: 263 LBS

## 2020-05-19 DIAGNOSIS — I10 ESSENTIAL HYPERTENSION: Primary | ICD-10-CM

## 2020-05-19 DIAGNOSIS — R77.8 ELEVATED TROPONIN: ICD-10-CM

## 2020-05-19 DIAGNOSIS — E78.5 DYSLIPIDEMIA: ICD-10-CM

## 2020-05-19 RX ORDER — LOSARTAN POTASSIUM 100 MG/1
100 TABLET ORAL DAILY
Qty: 90 TAB | Refills: 3 | Status: SHIPPED | OUTPATIENT
Start: 2020-05-19 | End: 2020-07-03 | Stop reason: SDUPTHER

## 2020-05-19 RX ORDER — ATORVASTATIN CALCIUM 40 MG/1
40 TABLET, FILM COATED ORAL DAILY
Qty: 90 TAB | Refills: 3 | Status: SHIPPED | OUTPATIENT
Start: 2020-05-19 | End: 2020-07-03 | Stop reason: SDUPTHER

## 2020-05-19 RX ORDER — AMLODIPINE BESYLATE 10 MG/1
10 TABLET ORAL DAILY
Qty: 90 TAB | Refills: 3 | Status: SHIPPED | OUTPATIENT
Start: 2020-05-19 | End: 2020-07-03 | Stop reason: SDUPTHER

## 2020-05-19 RX ORDER — HYDRALAZINE HYDROCHLORIDE 25 MG/1
25 TABLET, FILM COATED ORAL 3 TIMES DAILY
Qty: 270 TAB | Refills: 3 | Status: SHIPPED | OUTPATIENT
Start: 2020-05-19 | End: 2020-07-03 | Stop reason: SDUPTHER

## 2020-05-19 RX ORDER — METOPROLOL TARTRATE 50 MG/1
50 TABLET ORAL EVERY 12 HOURS
Qty: 180 TAB | Refills: 3 | Status: SHIPPED | OUTPATIENT
Start: 2020-05-19 | End: 2020-07-03 | Stop reason: SDUPTHER

## 2020-05-19 NOTE — PROGRESS NOTES
Ce Robert MD. [facility]              Patient: Cait Burnett  : 1975      Today's Date: 2020          HISTORY OF PRESENT ILLNESS:     History of Present Illness:    Doing well. Tolerating meds. No CP. Some BOSTON walking, but overall feeling good. No orthopnea. No problems walking a flight of stairs. Swelling resolves overnight. PAST MEDICAL HISTORY:     Past Medical History:   Diagnosis Date    (HFpEF) heart failure with preserved ejection fraction (HCC)     SURJIT (acute kidney injury) ([facility] 5        CHF [facility]congestive heart failure) ([facility]         HLD (hyperlipidemia)     Hypertension     Uncontrolled HTN        Past Surgical History:   Procedure Laterality Date    HX OTHER SURGICAL      testicular surgery in childhood           MEDICATIONS:     Current Outpatient Medications   Medication Sig Dispense Refill    amLODIPine (NORVASC) 10 mg tablet Take 1 Tab by mouth daily. 90 Tab 3    atorvastatin (LIPITOR) 40 mg tablet Take 1 Tab by mouth daily. 90 Tab 3    hydrALAZINE (APRESOLINE) 25 mg tablet Take 1 Tab by mouth three (3) times daily. 270 Tab 3    losartan (COZAAR) 100 mg tablet Take 1 Tab by mouth daily. 90 Tab 3    metoprolol tartrate (LOPRESSOR) 50 mg tablet Take 1 Tab by mouth every twelve (12) hours. 180 Tab 3    aspirin 81 mg chewable tablet Take 1 Tab by mouth daily.  30 Tab 0       Allergies   Allergen Reactions    Benadryl [Diphenhydramine Hcl] Hives           SOCIAL HISTORY:     Social History     Tobacco Use    Smoking status: Never Smoker    Smokeless tobacco: Never Used   Substance Use Topics    Alcohol use: Yes     Frequency: Monthly or less     Drinks per session: 1 or 2     Binge frequency: Never    Drug use: Never         FAMILY HISTORY:     Family History   Problem Relation Age of Onset    Asthma Sister     Asthma Brother            REVIEW OF SYMPTOMS:     Review of Symptoms:  Constitutional: Negative for fever, chills  HEENT: Negative for nosebleeds, tinnitus, and vision changes. Respiratory: Negative for cough, wheezing  Cardiovascular: Negative for orthopnea, claudication, syncope, and PND. Gastrointestinal: Negative for abdominal pain, diarrhea, melena. Genitourinary: Negative for dysuria  Musculoskeletal: Negative for myalgias. Skin: Negative for rash  Heme: No problems bleeding. Neurological: Negative for speech change and focal weakness. PHYSICAL EXAM:     Physical Exam:  Visit Vitals  /78 (BP 1 Location: Right arm, BP Patient Position: Sitting)   Pulse 75   Ht 5' 8\" (1.727 m)   Wt 263 lb (119.3 kg)   SpO2 96%   BMI 39.99 kg/m²     Patient appears generally well, mood and affect are appropriate and pleasant. HEENT:  Hearing intact, non-icteric, normocephalic, atraumatic. Neck Exam: Supple, No JVD   Lung Exam: Clear to auscultation, even breath sounds. Cardiac Exam: Regular rate and rhythm with no murmur or rub  Abdomen: Soft, non-tender, + obese . Extremities: Moves all ext well. Mild bilat lower extremity edema. Psych: Appropriate affect  Neuro - Grossly intact      LABS / OTHER STUDIES:     Lab Results   Component Value Date/Time    Sodium 136 05/09/2020 03:32 AM    Potassium 3.8 05/09/2020 03:32 AM    Chloride 102 05/09/2020 03:32 AM    CO2 29 05/09/2020 03:32 AM    Anion gap 5 05/09/2020 03:32 AM    Glucose 123 (H) 05/09/2020 03:32 AM    BUN 36 (H) 05/09/2020 03:32 AM    Creatinine 2.47 (H) 05/09/2020 03:32 AM    BUN/Creatinine ratio 15 05/09/2020 03:32 AM    GFR est AA 35 (L) 05/09/2020 03:32 AM    GFR est non-AA 29 (L) 05/09/2020 03:32 AM    Calcium 8.3 (L) 05/09/2020 03:32 AM    Bilirubin, total 0.8 05/07/2020 02:12 AM    AST (SGOT) 30 05/07/2020 02:12 AM    Alk.  phosphatase 89 05/07/2020 02:12 AM    Protein, total 6.1 05/07/2020 09:36 AM    Albumin 3.6 05/07/2020 02:12 AM    Globulin 3.9 05/07/2020 02:12 AM    A-G Ratio 0.9 (L) 05/07/2020 02:12 AM    ALT (SGPT) 49 05/07/2020 02:12 AM     Lab Results   Component Value Date/Time    WBC 10.4 05/08/2020 01:52 AM    HGB 13.3 05/08/2020 01:52 AM    HCT 40.5 05/08/2020 01:52 AM    PLATELET 428 76/33/7428 01:52 AM    MCV 85.1 05/08/2020 01:52 AM     Lab Results   Component Value Date/Time    Cholesterol, total 290 (H) 05/07/2020 02:12 AM    HDL Cholesterol 68 05/07/2020 02:12 AM    LDL, calculated 184 (H) 05/07/2020 02:12 AM    VLDL, calculated 38 05/07/2020 02:12 AM    Triglyceride 190 (H) 05/07/2020 02:12 AM    CHOL/HDL Ratio 4.3 05/07/2020 02:12 AM           CARDIAC DIAGNOSTICS:     Cardiac Evaluation Includes:    Component      Latest Ref Rng & Units 5/7/2020 5/7/2020           9:36 AM  9:36 AM   Renin Activity      0.167 - 5.380 ng/mL/hr 7.417 (H)    Aldosterone      0.0 - 30.0 ng/dL 22.0    Normetanephrine, free      0 - 145 pg/mL  135   Metanephrine, free      0 - 62 pg/mL  59         Echo 5/7/20 - severe LVH, LVEF 60-65%    Abd U/S 5/7/20 - 1. Increased parenchymal echogenicity of the kidneys, consistent with medical renal disease. No hydronephrosis. 2. Suggested 9 mm nonobstructing stone in the lower pole of the right kidney. ASSESSMENT AND PLAN:     Assessment and Plan:  1) HFpEF   - decompensated on 5/6/20 -- likely due to uncontrolled HTN and kidney disease  - severe LVH on echo with preserved systolic function   - On 7/97/06 he is doing better - edema much improved - he was not discharged on a diuretic -- defer diuretic management to Nephrology     2) HTN  - comes in with uncontrolled HTN on 5/6/20   - Higinio/renin ratio and metanephrines were OK   - On 5/19/20 - BP much better - continue regimen and follow BP at home     3) Abnormal Troponin 5/6/20 admission   - likely due to supply demand mismatch in setting of severe HTN and SURJIT  - He denies anginal complaints   - check a 2 day Lexiscan Cardiolite     4) Kidney disease  - he is to follow with Dr. Nikhil Emmanuel   - Follow labs     5) See me back in 3 months.   Patient expressed understanding of the plan - questions were answered. 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 . Kendrick Jennings MD, [address] [id number] [facility], [facility] [address] Suite [facility] [address], [address] [place] [address] Drive  [address]  Ph: [phone]   Ph [phone]      ADDENDUM   7/6/2020  Betina Neff 7/1/20 -  Baseline ECG: Normal sinus rhythm, Old anterior infarct. Q wave in V3. Non-specific T wave abn. There was no ST segment deviation noted during stress. Gated SPECT: Moderately dilated left ventricle. LVEF 44%. Nuclear Perfusion: Left ventricular perfusion is probably normal. There is a moderate sized, moderate grade, fixed defect involving the inferior wall and this is likely due to abdominal attenuation. No significant inducible ischemia. SDS = 2. Myocardial perfusion imaging supports a low risk stress test for ischemia. Stress test with low risk for ischemia. Treat medically. Will call pt. ADDENDUM   7/8/2020  I called patient with results. He is doing OK overall.

## 2020-05-19 NOTE — PROGRESS NOTES
Chief Complaint   Patient presents with   [facility] Follow Up     [facility] 5/6-9 [place]    CHF    Airway obstruction    Hypertension     Visit Vitals  /78 (BP 1 Location: Right arm, BP Patient Position: Sitting)   Pulse 75   Ht 5' 8\" (1.727 m)   Wt 263 lb (119.3 kg)   SpO2 96%   BMI 39.99 kg/m²       Chest pain denied   Since started meds since hospital, felt a little tired. 145/99 at home the other day. 130-90  SOB denied   Swelling in hands/feet not as bad as before, evening only LE. Slight pitting edema.   Dizziness denied   Recent hospital stays [facility] 5/6-9  Refills denied

## 2020-05-20 ENCOUNTER — VIRTUAL VISIT (OUTPATIENT)
Dept: FAMILY MEDICINE CLINIC | Age: 45
End: 2020-05-20

## 2020-05-20 DIAGNOSIS — R73.01 ELEVATED FASTING GLUCOSE: ICD-10-CM

## 2020-05-20 DIAGNOSIS — I50.30 HEART FAILURE WITH PRESERVED EJECTION FRACTION, UNSPECIFIED HF CHRONICITY (HCC): ICD-10-CM

## 2020-05-20 DIAGNOSIS — E78.2 MIXED HYPERLIPIDEMIA: ICD-10-CM

## 2020-05-20 DIAGNOSIS — Z76.89 ENCOUNTER TO ESTABLISH CARE WITH NEW DOCTOR: Primary | ICD-10-CM

## 2020-05-20 DIAGNOSIS — Z00.00 HEALTHCARE MAINTENANCE: ICD-10-CM

## 2020-05-20 DIAGNOSIS — I10 ESSENTIAL HYPERTENSION: ICD-10-CM

## 2020-05-20 DIAGNOSIS — N18.9 CHRONIC KIDNEY DISEASE, UNSPECIFIED CKD STAGE: ICD-10-CM

## 2020-05-20 DIAGNOSIS — E66.01 OBESITY, CLASS III, BMI 40-49.9 (MORBID OBESITY) (HCC): ICD-10-CM

## 2020-05-20 NOTE — PROGRESS NOTES
Rajiv Padilla  39 y.o. male  1975  [address]  [facility] [address]  [address]:    Telemedicine Progress Note  Joshua Calero MD       Encounter Date and Time: May 20, 2020 at 8:35 AM    Consent:  He and/or the health care decision maker is aware that that he may receive a bill for this telemedicine service, depending on his insurance coverage, and has provided verbal consent to proceed: Yes    Chief Complaint   Patient presents with   [address]    Hypertension    CHF    Chronic Kidney Disease     History of Present Illness   Rajiv Padilla is a 39 y.o. male was evaluated by synchronous (real-time) audio-video technology from home, through a secure patient portal.    Patient admitted to hospitalist service 5/6-5/9 due to acute respiratory failure due to new-onset diastolic CHF triggered by uncontrolled HTN and CKD. I have personally reviewed this encounter and is reviewed here. Patient was diuresed with improvement in Sx. He was discharged on ASA, 4 BP medications noted below and atorvastatin. Patient was seen by cardiology yesterday with plan for a 2 day cardiolyte test.  He has a telemedicine visit with the Nephrologist on June 1st.     Patient notes that since discharge he has been feeling \"pretty good. \"  States he had some jitteriness for a few days due to his new medications, but this has improved. He has drastically changed his diet, eating more fish, chicken and lean meats as well as whole grains and decreasing his salt intake. He has been walking often on trails through [address] [place] [address] and has enjoyed this. Prior to this hospitalization he had not followed regularly with a physician. Only FHx notable is for asthma. 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      I reviewed the following labwork and imaging from recent hospitalization:  Lab Results   Component Value Date/Time    WBC 10.4 05/08/2020 01:52 AM HGB 13.3 05/08/2020 01:52 AM    HCT 40.5 05/08/2020 01:52 AM    PLATELET 538 77[birth year]1929 01:52 AM    MCV 85.1 05/08/2020 01:52 AM     Lab Results   Component Value Date/Time    Sodium 136 05/09/2020 03:32 AM    Potassium 3.8 05/09/2020 03:32 AM    Chloride 102 05/09/2020 03:32 AM    CO2 29 05/09/2020 03:32 AM    Anion gap 5 05/09/2020 03:32 AM    Glucose 123 (H) 05/09/2020 03:32 AM    BUN 36 (H) 05/09/2020 03:32 AM    Creatinine 2.47 (H) 05/09/2020 03:32 AM    BUN/Creatinine ratio 15 05/09/2020 03:32 AM    GFR est AA 35 (L) 05/09/2020 03:32 AM    GFR est non-AA 29 (L) 05/09/2020 03:32 AM    Calcium 8.3 (L) 05/09/2020 03:32 AM    Bilirubin, total 0.8 05/07/2020 02:12 AM    AST (SGOT) 30 05/07/2020 02:12 AM    Alk. phosphatase 89 05/07/2020 02:12 AM    Protein, total 6.1 05/07/2020 09:36 AM    Albumin 3.6 05/07/2020 02:12 AM    Globulin 3.9 05/07/2020 02:12 AM    A-G Ratio 0.9 (L) 05/07/2020 02:12 AM    ALT (SGPT) 49 05/07/2020 02:12 AM     Lab Results   Component Value Date/Time    Cholesterol, total 290 (H) 05/07/2020 02:12 AM    HDL Cholesterol 68 05/07/2020 02:12 AM    LDL, calculated 184 (H) 05/07/2020 02:12 AM    VLDL, calculated 38 05/07/2020 02:12 AM    Triglyceride 190 (H) 05/07/2020 02:12 AM    CHOL/HDL Ratio 4.3 05/07/2020 02:12 AM     05/06/20   ECHO ADULT COMPLETE 05/07/2020 5/7/2020    Narrative · Normal cavity size and systolic function (ejection fraction normal). Severe concentric hypertrophy. Estimated left ventricular ejection   fraction is 60 - 65%. No regional wall motion abnormality noted. Left   ventricular diastolic dysfunction. Signed by: Anthony Vazquez MD     US Results (most recent):  Results from Hospital Encounter encounter on 05/06/20   US RETROPERITONEUM COMP    Narrative EXAM:  US RETROPERITONEUM COMP     INDICATION: SURJIT with elevated BUn and Cr. COMPARISON: None.     TECHNIQUE:  Real-time sonography of the kidneys, retroperitoneum and bladder was performed  with multiple static images obtained. FINDINGS:  RIGHT KIDNEY:  The right kidney measures 13.0 cm. The right kidney has increased parenchymal  echogenicity. No evidence of contour deforming mass. No hydronephrosis. There is  a suggested 9 mm nonobstructing stone in the lower pole. LEFT KIDNEY:   The left kidney measures 11.8 cm. The left kidney has increased parenchymal  echogenicity. No evidence of contour deforming mass. No hydronephrosis. RETROPERITONEUM:  The aorta and IVC are not well seen due to overlying bowel gas. BLADDER:  The urinary bladder is normal.      Impression IMPRESSION:   1. Increased parenchymal echogenicity of the kidneys, consistent with medical  renal disease. No hydronephrosis. 2. Suggested 9 mm nonobstructing stone in the lower pole of the right kidney. Review of Systems   Review of Systems   Constitutional: Negative for malaise/fatigue. Respiratory: Positive for shortness of breath (Mild BOSTON). Negative for cough and wheezing. Cardiovascular: Negative for chest pain and palpitations. Gastrointestinal: Negative. All other systems reviewed and are negative. Vitals/Objective:     General: alert, cooperative, no distress   Mental  status: mental status: alert, oriented to person, place, and time, normal mood, behavior, speech, dress, motor activity, and thought processes   Resp: resp: normal effort and no respiratory distress   Neuro: neuro: no gross deficits   Skin: skin: no discoloration or lesions of concern on visible areas   Due to this being a TeleHealth evaluation, many elements of the physical examination are unable to be assessed. Assessment and Plan:     1. Encounter to establish care with new doctor  Not regularly followed by PCP. Marianne Morales was counseled on age-appropriate/ guideline-based risk prevention behaviors and screening for a 39y.o. year old   male . We also discussed adjustments in screening based on family history if necessary. Printed instructions for preventative screening guidelines and healthy behaviors given to patient with after visit summary. 2. Heart failure with preserved ejection fraction, unspecified HF chronicity ([facility]  Followed by Cardiology with plan for cardiolyte testing. ECHO with hypertrophy and diastolic dysfunction with preserved LVEF. Likely due to long hx of uncontrolled HTN. Continue to work on risk factor modification with BP and Lipid control and weight loss. I would recommend we follow-up on blood sugar as well as this was mildly elevated in the hospital.    3. Essential hypertension  Continue current treatment as directed by Cardiology and Nephrology    4. Mixed hyperlipidemia  Continue atorvastatin with plan to recheck in 3-6 months. Discussed dietary changes. 5. Chronic kidney disease, unspecified CKD stage  Baseline uncertain, however was normal in 2017. Likely Hypertensive CKD given findings on Ultrasound. Has follow-up with Nephrology. Avoid nephrotoxic medications. Continue on ARB. Proteinuria present. 6. Healthcare maintenance  Reviewed recommendations for Tdap and Pneumovax. Recommend starting colon cancer screening at age 39.     9. Obesity, Class III, BMI 40-49.9 (morbid obesity) ([facility]  Borderline Class II-III. Continue dietary changes. Once clear from cardiology, may consider safe increase in physical activity. Patient to ask wife about concerns for snoring/apnea and consider sleep study if sx present. We discussed the expected course, resolution and complications of the diagnosis(es) in detail. Medication risks, benefits, costs, interactions, and alternatives were discussed as indicated. I advised him to contact the office if his condition worsens, changes or fails to improve as anticipated. He expressed understanding with the diagnosis(es) and plan.  Patient understands that this encounter was a temporary measure, and the importance of further follow up and examination was emphasized. Patient verbalized understanding. Patient informed to follow up: 3-4 months  Electronically Signed: Gladsy Nova MD    Providers location when delivering service (clinic, hospital, home): [place]  [phone] for Established Patients may apply to this Telehealth Visit. POS code: 18. Modifier GT      Pursuant to the emergency declaration under the [address] [place] [address] [facility] authority and the [employer] Act, this Virtual  Visit was conducted, with patient's consent, to reduce the patient's risk of exposure to COVID-19 and provide continuity of care for an established patient. Services were provided through a video synchronous discussion virtually to substitute for in-person clinic visit. History   Patients past medical, surgical and family histories were reviewed and updated.       Past Medical History:   Diagnosis Date    (HFpEF) heart failure with preserved ejection fraction (HCC)     SURJIT (acute kidney injury) ([facility] 5    5/20    CHF (congestive heart failure) ([facility]     5/20    HLD (hyperlipidemia)     Hypertension     Uncontrolled HTN 5/20     Past Surgical History:   Procedure Laterality Date    HX OTHER SURGICAL      testicular surgery in childhood     Family History   Problem Relation Age of Onset    Asthma Sister     Asthma Brother      Social History     Socioeconomic History    Marital status:      Spouse name: Not on file    Number of children: Not on file    Years of education: Not on file    Highest education level: Not on file   Occupational History    Occupation: human resources    Occupation: dj   Social Needs    Financial resource strain: Not on file    Food insecurity     Worry: Not on file     Inability: Not on file   [employer] needs     Medical: Not on file     Non-medical: Not on file   Tobacco Use    Smoking status: Never Smoker    Smokeless tobacco: Never Used   Substance and Sexual Activity    Alcohol use: Yes     Frequency: Monthly or less     Drinks per session: 1 or 2     Binge frequency: Never    Drug use: Never    Sexual activity: Yes     Partners: Female   Lifestyle    Physical activity     Days per week: Not on file     Minutes per session: Not on file    Stress: Not on file   Relationships    Social connections     Talks on phone: Not on file     Gets together: Not on file     Attends Jew service: Not on file     Active member of club or organization: Not on file     Attends meetings of clubs or organizations: Not on file     Relationship status: Not on file    Intimate partner violence     Fear of current or ex partner: Not on file     Emotionally abused: Not on file     Physically abused: Not on file     Forced sexual activity: Not on file   Other Topics Concern    Not on file   Social History Narrative    Not on file     Patient Active Problem List   Diagnosis Code    Dyspnea on exertion R06.00    CHF [facility]est[facility] heart failure) ([facility] I50.9    HTN (hypertension) I10    Noncompliance Z91.19    (HFpEF) heart failure with preserved ejection fraction (HCC) I50.30    SURJIT (acute kidney injury) ([facility] N17.9    HLD (hyperlipidemia) E78.5    Hypertension I10          Current Medications/Allergies   Medications and Allergies reviewed:    Current Outpatient Medications   Medication Sig Dispense Refill    amLODIPine (NORVASC) 10 mg tablet Take 1 Tab by mouth daily. 90 Tab 3    atorvastatin (LIPITOR) 40 mg tablet Take 1 Tab by mouth daily. 90 Tab 3    hydrALAZINE (APRESOLINE) 25 mg tablet Take 1 Tab by mouth three (3) times daily. 270 Tab 3    losartan (COZAAR) 100 mg tablet Take 1 Tab by mouth daily. 90 Tab 3    metoprolol tartrate (LOPRESSOR) 50 mg tablet Take 1 Tab by mouth every twelve (12) hours. 180 Tab 3    aspirin 81 mg chewable tablet Take 1 Tab by mouth daily.  30 Tab 0     Allergies   Allergen Reactions    Benadryl [Diphenhydramine Hcl] Hives

## 2020-06-12 RX ORDER — GUAIFENESIN 100 MG/5ML
81 LIQUID (ML) ORAL DAILY
Qty: 90 TAB | Refills: 0 | Status: SHIPPED | OUTPATIENT
Start: 2020-06-12 | End: 2021-12-29 | Stop reason: SDUPTHER

## 2020-06-12 NOTE — TELEPHONE ENCOUNTER
Per Dr. Rajni Jiang VO:  [address]  Future Appointments   Date Time Provider William Borrero   7/1/2020  1:30 PM ALLYSSA BURNS   7/2/2020  1:30 PM ALLYSSA BURNS     Requested Prescriptions     Pending Prescriptions Disp Refills    aspirin 81 mg chewable tablet 30 Tab 0     Sig: Take 1 Tab by mouth daily.

## 2020-07-06 RX ORDER — HYDRALAZINE HYDROCHLORIDE 25 MG/1
25 TABLET, FILM COATED ORAL 3 TIMES DAILY
Qty: 270 TAB | Refills: 3 | Status: SHIPPED | OUTPATIENT
Start: 2020-07-06 | End: 2021-08-05 | Stop reason: SDUPTHER

## 2020-07-06 RX ORDER — LOSARTAN POTASSIUM 100 MG/1
100 TABLET ORAL DAILY
Qty: 90 TAB | Refills: 3 | Status: SHIPPED | OUTPATIENT
Start: 2020-07-06 | End: 2021-08-05 | Stop reason: SDUPTHER

## 2020-07-06 RX ORDER — METOPROLOL TARTRATE 50 MG/1
50 TABLET ORAL EVERY 12 HOURS
Qty: 180 TAB | Refills: 3 | Status: SHIPPED | OUTPATIENT
Start: 2020-07-06 | End: 2021-08-05 | Stop reason: SDUPTHER

## 2020-07-06 RX ORDER — ATORVASTATIN CALCIUM 40 MG/1
40 TABLET, FILM COATED ORAL DAILY
Qty: 90 TAB | Refills: 3 | Status: SHIPPED | OUTPATIENT
Start: 2020-07-06 | End: 2021-08-05 | Stop reason: SDUPTHER

## 2020-07-06 RX ORDER — AMLODIPINE BESYLATE 10 MG/1
10 TABLET ORAL DAILY
Qty: 90 TAB | Refills: 3 | Status: SHIPPED | OUTPATIENT
Start: 2020-07-06 | End: 2021-08-05 | Stop reason: SDUPTHER

## 2021-08-03 PROBLEM — I10 HTN (HYPERTENSION): Status: RESOLVED | Noted: 2020-05-09 | Resolved: 2021-08-03

## 2021-08-05 RX ORDER — ATORVASTATIN CALCIUM 40 MG/1
40 TABLET, FILM COATED ORAL DAILY
Qty: 90 TABLET | Refills: 0 | Status: SHIPPED | OUTPATIENT
Start: 2021-08-05 | End: 2021-12-29 | Stop reason: SDUPTHER

## 2021-08-05 RX ORDER — AMLODIPINE BESYLATE 10 MG/1
10 TABLET ORAL DAILY
Qty: 90 TABLET | Refills: 0 | Status: SHIPPED | OUTPATIENT
Start: 2021-08-05 | End: 2021-12-29 | Stop reason: SDUPTHER

## 2021-08-05 RX ORDER — HYDRALAZINE HYDROCHLORIDE 25 MG/1
25 TABLET, FILM COATED ORAL 3 TIMES DAILY
Qty: 270 TABLET | Refills: 0 | Status: SHIPPED | OUTPATIENT
Start: 2021-08-05 | End: 2021-12-29 | Stop reason: SDUPTHER

## 2021-08-05 RX ORDER — LOSARTAN POTASSIUM 100 MG/1
100 TABLET ORAL DAILY
Qty: 90 TABLET | Refills: 0 | Status: SHIPPED | OUTPATIENT
Start: 2021-08-05 | End: 2021-12-29 | Stop reason: SDUPTHER

## 2021-08-05 RX ORDER — METOPROLOL TARTRATE 50 MG/1
50 TABLET ORAL EVERY 12 HOURS
Qty: 180 TABLET | Refills: 0 | Status: SHIPPED | OUTPATIENT
Start: 2021-08-05 | End: 2021-12-29 | Stop reason: SDUPTHER

## 2021-08-05 NOTE — TELEPHONE ENCOUNTER
Refill per VO of Dr. Mitch Troncoso  Last appt: 5/19/20  NOV needed in 3 mos. Requested Prescriptions     Signed Prescriptions Disp Refills    amLODIPine (NORVASC) 10 mg tablet 90 Tablet 0     Sig: Take 1 Tablet by mouth daily. Appointment needed for further refills. Authorizing Provider: Mamie Eddy     Ordering User: Brando Vasquez atorvastatin (LIPITOR) 40 mg tablet 90 Tablet 0     Sig: Take 1 Tablet by mouth daily. Appointment needed for further refills. Authorizing Provider: Mamie Eddy     Ordering User: Brando Vasquez hydrALAZINE (APRESOLINE) 25 mg tablet 270 Tablet 0     Sig: Take 1 Tablet by mouth three (3) times daily. Appointment needed for further refills. Authorizing Provider: Mamie Rodarte User: Brando Vasquez losartan (COZAAR) 100 mg tablet 90 Tablet 0     Sig: Take 1 Tablet by mouth daily. Appointment needed for further refills. Authorizing Provider: Mamie Eddy     Ordering User: Brando Vasquez metoprolol tartrate (LOPRESSOR) 50 mg tablet 180 Tablet 0     Sig: Take 1 Tablet by mouth every twelve (12) hours. Appointment needed for further refills.      Authorizing Provider: Mamie Rodarte User: Mark Palafox

## 2021-09-07 RX ORDER — ATORVASTATIN CALCIUM 40 MG/1
TABLET, FILM COATED ORAL
Qty: 90 TABLET | Refills: 0 | OUTPATIENT
Start: 2021-09-07

## 2021-09-07 RX ORDER — METOPROLOL TARTRATE 50 MG/1
TABLET ORAL
Qty: 180 TABLET | Refills: 0 | OUTPATIENT
Start: 2021-09-07

## 2021-09-07 RX ORDER — AMLODIPINE BESYLATE 10 MG/1
TABLET ORAL
Qty: 90 TABLET | Refills: 0 | OUTPATIENT
Start: 2021-09-07

## 2021-09-07 RX ORDER — HYDRALAZINE HYDROCHLORIDE 25 MG/1
TABLET, FILM COATED ORAL
Qty: 270 TABLET | Refills: 0 | OUTPATIENT
Start: 2021-09-07

## 2021-09-07 RX ORDER — LOSARTAN POTASSIUM 100 MG/1
TABLET ORAL
Qty: 90 TABLET | Refills: 0 | OUTPATIENT
Start: 2021-09-07

## 2021-09-07 NOTE — TELEPHONE ENCOUNTER
Refill per VO of Dr. Sherry Huerta  Last appt: Visit date not found  No future appointments.     Requested Prescriptions     Pending Prescriptions Disp Refills    amLODIPine (NORVASC) 10 mg tablet [Pharmacy Med Name: AMLODIPINE 10 MG TAB[*]] 90 Tablet 0     Sig: TAKE ONE TABLET BY MOUTH ONE TIME DAILY**APPOINTMENT NEEDED FOR MORE REFILLS    atorvastatin (LIPITOR) 40 mg tablet [Pharmacy Med Name: ATORVASTATIN 40 MG TAB[*]] 90 Tablet 0     Sig: TAKE ONE TABLET BY MOUTH ONE TIME DAILY**APPOINTMENT NEEDED FOR MORE REFILLS    losartan (COZAAR) 100 mg tablet [Pharmacy Med Name: LOSARTAN 100 MG TAB[*]] 90 Tablet 0     Sig: TAKE ONE TABLET BY MOUTH ONE TIME DAILY**APPOINTMENT NEEDED FOR MORE REFILLS    hydrALAZINE (APRESOLINE) 25 mg tablet [Pharmacy Med Name: HYDRALAZINE 25 MG TAB[*]] 270 Tablet 0     Sig: TAKE ONE TABLET BY MOUTH THREE TIMES A DAY **APPOINTMENT NEEDED FOR MORE REFILLS    metoprolol tartrate (LOPRESSOR) 50 mg tablet [Pharmacy Med Name: METOPROLOL TART 50 MG TAB[*]] 180 Tablet 0     Sig: TAKE ONE TABLET BY MOUTH EVERY 12 HOURS**APPOINTMENT NEEDED FOR MORE REFILLS

## 2021-12-03 RX ORDER — HYDRALAZINE HYDROCHLORIDE 25 MG/1
TABLET, FILM COATED ORAL
Qty: 270 TABLET | Refills: 0 | OUTPATIENT
Start: 2021-12-03

## 2021-12-03 RX ORDER — LOSARTAN POTASSIUM 100 MG/1
TABLET ORAL
Qty: 90 TABLET | Refills: 0 | OUTPATIENT
Start: 2021-12-03

## 2021-12-03 RX ORDER — ATORVASTATIN CALCIUM 40 MG/1
TABLET, FILM COATED ORAL
Qty: 90 TABLET | Refills: 0 | OUTPATIENT
Start: 2021-12-03

## 2021-12-03 RX ORDER — AMLODIPINE BESYLATE 10 MG/1
TABLET ORAL
Qty: 90 TABLET | Refills: 0 | OUTPATIENT
Start: 2021-12-03

## 2021-12-03 NOTE — TELEPHONE ENCOUNTER
Refill per VO of Dr. Fabricio Estrella  Last appt: 7/2/2020  No future appointments.     Requested Prescriptions     Pending Prescriptions Disp Refills    amLODIPine (NORVASC) 10 mg tablet [Pharmacy Med Name: AMLODIPINE 10 MG TAB[*]] 90 Tablet 0     Sig: TAKE ONE TABLET BY MOUTH ONE TIME DAILY**APPOINTMENT NEEDED FOR MORE REFILLS    hydrALAZINE (APRESOLINE) 25 mg tablet [Pharmacy Med Name: HYDRALAZINE 25 MG TAB[*]] 270 Tablet 0     Sig: TAKE ONE TABLET BY MOUTH THREE TIMES A DAY **APPOINTMENT NEEDED FOR MORE REFILLS    losartan (COZAAR) 100 mg tablet [Pharmacy Med Name: LOSARTAN 100 MG TAB] 90 Tablet 0     Sig: TAKE ONE TABLET BY MOUTH ONE TIME DAILY**APPOINTMENT NEEDED FOR MORE REFILLS    atorvastatin (LIPITOR) 40 mg tablet [Pharmacy Med Name: ATORVASTATIN 40 MG TAB[*]] 90 Tablet 0     Sig: TAKE ONE TABLET BY MOUTH ONE TIME DAILY**APPOINTMENT NEEDED FOR MORE REFILLS     Refill was denied because,  PT NEEDS AN APPOINTMENT

## 2021-12-07 RX ORDER — METOPROLOL TARTRATE 50 MG/1
TABLET ORAL
Qty: 180 TABLET | Refills: 0 | OUTPATIENT
Start: 2021-12-07

## 2021-12-07 NOTE — TELEPHONE ENCOUNTER
Refill per VO of Dr. Jay Kuhn  Last appt: 7/2/2020  No future appointments.     Requested Prescriptions     Pending Prescriptions Disp Refills    metoprolol tartrate (LOPRESSOR) 50 mg tablet [Pharmacy Med Name: METOPROLOL TART 50 MG TAB[*]] 180 Tablet 0     Sig: TAKE ONE TABLET BY MOUTH EVERY 12 HOURS**APPOINTMENT NEEDED FOR MORE REFILLS     Refill was denied because,  PT NEEDS AN APPOINTMENT

## 2021-12-29 RX ORDER — ATORVASTATIN CALCIUM 40 MG/1
40 TABLET, FILM COATED ORAL DAILY
Qty: 14 TABLET | Refills: 0 | Status: SHIPPED | OUTPATIENT
Start: 2021-12-29 | End: 2022-01-12 | Stop reason: SDUPTHER

## 2021-12-29 RX ORDER — GUAIFENESIN 100 MG/5ML
81 LIQUID (ML) ORAL DAILY
Qty: 14 TABLET | Refills: 0 | Status: SHIPPED | OUTPATIENT
Start: 2021-12-29 | End: 2022-01-12 | Stop reason: ALTCHOICE

## 2021-12-29 RX ORDER — METOPROLOL TARTRATE 50 MG/1
50 TABLET ORAL EVERY 12 HOURS
Qty: 28 TABLET | Refills: 0 | Status: SHIPPED | OUTPATIENT
Start: 2021-12-29 | End: 2022-01-12 | Stop reason: SDUPTHER

## 2021-12-29 RX ORDER — LOSARTAN POTASSIUM 100 MG/1
100 TABLET ORAL DAILY
Qty: 14 TABLET | Refills: 0 | Status: SHIPPED | OUTPATIENT
Start: 2021-12-29 | End: 2022-01-12 | Stop reason: SDUPTHER

## 2021-12-29 RX ORDER — AMLODIPINE BESYLATE 10 MG/1
10 TABLET ORAL DAILY
Qty: 14 TABLET | Refills: 0 | Status: SHIPPED | OUTPATIENT
Start: 2021-12-29 | End: 2022-01-12 | Stop reason: ALTCHOICE

## 2021-12-29 RX ORDER — HYDRALAZINE HYDROCHLORIDE 25 MG/1
25 TABLET, FILM COATED ORAL 3 TIMES DAILY
Qty: 42 TABLET | Refills: 0 | Status: SHIPPED | OUTPATIENT
Start: 2021-12-29 | End: 2022-01-12 | Stop reason: SDUPTHER

## 2021-12-29 NOTE — TELEPHONE ENCOUNTER
Refill per VO of Dr. Jay Kuhn  Last appt: Visit date not found  Future Appointments   Date Time Provider William Borrero   1/12/2022  3:30 PM Brent Coffey NP [facility]       Requested Prescriptions     Pending Prescriptions Disp Refills    amLODIPine (NORVASC) 10 mg tablet 14 Tablet 0     Sig: Take 1 Tablet by mouth daily. Appointment needed for further refills.  aspirin 81 mg chewable tablet 14 Tablet 0     Sig: Take 1 Tablet by mouth daily.  atorvastatin (LIPITOR) 40 mg tablet 14 Tablet 0     Sig: Take 1 Tablet by mouth daily. Appointment needed for further refills.  hydrALAZINE (APRESOLINE) 25 mg tablet 42 Tablet 0     Sig: Take 1 Tablet by mouth three (3) times daily. Appointment needed for further refills.  losartan (COZAAR) 100 mg tablet 14 Tablet 0     Sig: Take 1 Tablet by mouth daily. Appointment needed for further refills.  metoprolol tartrate (LOPRESSOR) 50 mg tablet 28 Tablet 0     Sig: Take 1 Tablet by mouth every twelve (12) hours. Appointment needed for further refills.      Refill given only up to his date of appointment

## 2022-01-12 ENCOUNTER — OFFICE VISIT (OUTPATIENT)
Dept: CARDIOLOGY CLINIC | Age: 47
End: 2022-01-12
Payer: COMMERCIAL

## 2022-01-12 VITALS
OXYGEN SATURATION: 95 % | HEIGHT: 68 IN | RESPIRATION RATE: 18 BRPM | SYSTOLIC BLOOD PRESSURE: 132 MMHG | HEART RATE: 74 BPM | WEIGHT: 288.2 LBS | BODY MASS INDEX: 43.68 KG/M2 | DIASTOLIC BLOOD PRESSURE: 94 MMHG

## 2022-01-12 DIAGNOSIS — I50.32 CHRONIC DIASTOLIC CONGESTIVE HEART FAILURE (HCC): ICD-10-CM

## 2022-01-12 DIAGNOSIS — I10 HYPERTENSION, UNSPECIFIED TYPE: Primary | ICD-10-CM

## 2022-01-12 DIAGNOSIS — E78.2 MIXED HYPERLIPIDEMIA: ICD-10-CM

## 2022-01-12 PROCEDURE — 93000 ELECTROCARDIOGRAM COMPLETE: CPT | Performed by: NURSE PRACTITIONER

## 2022-01-12 PROCEDURE — 99214 OFFICE O/P EST MOD 30 MIN: CPT | Performed by: NURSE PRACTITIONER

## 2022-01-12 RX ORDER — ATORVASTATIN CALCIUM 40 MG/1
40 TABLET, FILM COATED ORAL DAILY
Qty: 30 TABLET | Refills: 3 | Status: SHIPPED | OUTPATIENT
Start: 2022-01-12 | End: 2022-03-03

## 2022-01-12 RX ORDER — LOSARTAN POTASSIUM 100 MG/1
100 TABLET ORAL DAILY
Qty: 30 TABLET | Refills: 3 | Status: SHIPPED | OUTPATIENT
Start: 2022-01-12 | End: 2022-03-03

## 2022-01-12 RX ORDER — HYDRALAZINE HYDROCHLORIDE 50 MG/1
50 TABLET, FILM COATED ORAL 3 TIMES DAILY
Qty: 90 TABLET | Refills: 3 | Status: SHIPPED | OUTPATIENT
Start: 2022-01-12 | End: 2022-06-06 | Stop reason: DRUGHIGH

## 2022-01-12 RX ORDER — METOPROLOL TARTRATE 50 MG/1
50 TABLET ORAL EVERY 12 HOURS
Qty: 60 TABLET | Refills: 3 | Status: SHIPPED | OUTPATIENT
Start: 2022-01-12 | End: 2022-03-03

## 2022-01-12 NOTE — PROGRESS NOTES
Patient: Pramod Vides  : 1975    Today's Date: 2022    Mr. Toñito Ye was last seen by Dr. Melvin Collins 2020. He returns today to re-establish cardiac care. HISTORY OF PRESENT ILLNESS:     He reports compliance with medications but does return today to obtain refills. 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 Does not currently have medical insurance. He also DJ's on the weekends. He denies any exertional chest pain. Some shortness of breath with longer exertion. Lives in [facility] and can use the paths. Has an Apple watch - getting 2-3K per day right now. Some intermittent swelling. Gone after sleeping. No orthopnea or PND. Does snore. Dr. Brady Ferris - last seen ~ 1 year ago. Labs showed CKD 3B. Avoids potassium rich foods. No dark soda, red meat, and high salt. He denies any tachycardia, palpitations, syncope or near syncope. PAST MEDICAL HISTORY:     Past Medical History:   Diagnosis Date    (HFpEF) heart failure with preserved ejection fraction (HCC)     [facility]) [facility] 5        CHF [facility]congestive heart failure) ([facility]         HLD (hyperlipidemia)     Hypertension     Uncontrolled HTN        Past Surgical History:   Procedure Laterality Date    HX OTHER SURGICAL      testicular surgery in childhood       MEDICATIONS:     Current Outpatient Medications   Medication Sig Dispense Refill    amLODIPine (NORVASC) 10 mg tablet Take 1 Tablet by mouth daily. Appointment needed for further refills. 14 Tablet 0    aspirin 81 mg chewable tablet Take 1 Tablet by mouth daily. 14 Tablet 0    atorvastatin (LIPITOR) 40 mg tablet Take 1 Tablet by mouth daily. Appointment needed for further refills. 14 Tablet 0    hydrALAZINE (APRESOLINE) 25 mg tablet Take 1 Tablet by mouth three (3) times daily. Appointment needed for further refills.  42 Tablet 0    losartan (COZAAR) 100 mg tablet Take 1 Tablet by mouth daily. Appointment needed for further refills. 14 Tablet 0    metoprolol tartrate (LOPRESSOR) 50 mg tablet Take 1 Tablet by mouth every twelve (12) hours. Appointment needed for further refills. 28 Tablet 0       Allergies   Allergen Reactions    Benadryl [Diphenhydramine Hcl] Hives       SOCIAL HISTORY:     Social History     Tobacco Use    Smoking status: Never Smoker    Smokeless tobacco: Never Used   Substance Use Topics    Alcohol use: Never    Drug use: Never       FAMILY HISTORY:     Family History   Problem Relation Age of Onset    Asthma Sister     Asthma Brother      REVIEW OF SYMPTOMS:   Pertinent positives per HPI   Constitutional: Negative for fever, chills  HEENT: Negative for nosebleeds, tinnitus, and vision changes. Respiratory: Negative for cough, wheezing  Cardiovascular: Negative for orthopnea, claudication, syncope, and PND. Gastrointestinal: Negative for abdominal pain, diarrhea, melena. Genitourinary: Negative for dysuria  Musculoskeletal: Negative for myalgias. Skin: Negative for rash  Heme: No problems bleeding. Neurological: Negative for speech change and focal weakness. PHYSICAL EXAM:     Visit Vitals  BP (!) 132/94   Pulse 74   Resp 18   Ht 5' 8\" (1.727 m)   Wt 288 lb 3.2 oz (130.7 kg)   SpO2 95%   BMI 43.82 kg/m²        Wt Readings from Last 3 Encounters:   01/12/22 288 lb 3.2 oz (130.7 kg)   05/19/20 263 lb (119.3 kg)   05/09/20 260 lb 2.3 oz (118 kg)     Patient appears generally well, mood and affect are appropriate and pleasant. HEENT:  Hearing intact, non-icteric, normocephalic, atraumatic. Neck Exam: Supple, No JVD   Lung Exam: Clear to auscultation, even breath sounds. Cardiac Exam: Regular rate and rhythm with no murmur or rub  Abdomen: Soft, non-tender, + obese . Extremities: Moves all ext well. Mild bilat lower extremity edema.   Psych: Appropriate affect  Neuro - Grossly intact    LABS / OTHER STUDIES: Lab Results   Component Value Date/Time    Sodium 136 05/09/2020 03:32 AM    Potassium 3.8 05/09/2020 03:32 AM    Chloride 102 05/09/2020 03:32 AM    CO2 29 05/09/2020 03:32 AM    Anion gap 5 05/09/2020 03:32 AM    Glucose 123 (H) 05/09/2020 03:32 AM    BUN 36 (H) 05/09/2020 03:32 AM    Creatinine 2.47 (H) 05/09/2020 03:32 AM    BUN/Creatinine ratio 15 05/09/2020 03:32 AM    GFR est AA 35 (L) 05/09/2020 03:32 AM    GFR est non-AA 29 (L) 05/09/2020 03:32 AM    Calcium 8.3 (L) 05/09/2020 03:32 AM    Bilirubin, total 0.8 05/07/2020 02:12 AM    Alk. phosphatase 89 05/07/2020 02:12 AM    Protein, total 6.1 05/07/2020 09:36 AM    Albumin 3.6 05/07/2020 02:12 AM    Globulin 3.9 05/07/2020 02:12 AM    A-G Ratio 0.9 (L) 05/07/2020 02:12 AM    ALT (SGPT) 49 05/07/2020 02:12 AM     Lab Results   Component Value Date/Time    WBC 10.4 05/08/2020 01:52 AM    HGB 13.3 05/08/2020 01:52 AM    HCT 40.5 05/08/2020 01:52 AM    PLATELET 511 18/72/0155 01:52 AM    MCV 85.1 05/08/2020 01:52 AM     Lab Results   Component Value Date/Time    Cholesterol, total 290 (H) 05/07/2020 02:12 AM    HDL Cholesterol 68 05/07/2020 02:12 AM    LDL, calculated 184 (H) 05/07/2020 02:12 AM    VLDL, calculated 38 05/07/2020 02:12 AM    Triglyceride 190 (H) 05/07/2020 02:12 AM    CHOL/HDL Ratio 4.3 05/07/2020 02:12 AM       CARDIAC DIAGNOSTICS:       Component      Latest Ref Rng & Units 5/7/2020 5/7/2020           9:36 AM  9:36 AM   Renin Activity      0.167 - 5.380 ng/mL/hr 7.417 (H)    Aldosterone      0.0 - 30.0 ng/dL 22.0    Normetanephrine, free      0 - 145 pg/mL  135   Metanephrine, free      0 - 62 pg/mL  59     Echo 5/7/20 - severe LVH, LVEF 60-65%    Abd U/S 5/7/20 - 1. Increased parenchymal echogenicity of the kidneys, consistent with medical renal disease. No hydronephrosis. 2. Suggested 9 mm nonobstructing stone in the lower pole of the right kidney. Lexiscan Cardiolite 7/1/20 -  Baseline ECG: Normal sinus rhythm, Old anterior infarct. Q wave in V3. Non-specific T wave abn. There was no ST segment deviation noted during stress. Gated SPECT: Moderately dilated left ventricle. LVEF 44%. Nuclear Perfusion: Left ventricular perfusion is probably normal. There is a moderate sized, moderate grade, fixed defect involving the inferior wall and this is likely due to abdominal attenuation. No significant inducible ischemia. SDS = 2. Myocardial perfusion imaging supports a low risk stress test for ischemia. ASSESSMENT AND PLAN:     1) HFpEF - decompensated on 5/6/20 -- likely due to uncontrolled HTN and kidney disease. Severe LVH on echo with preserved systolic function. Weight is up ~ 20 pounds since 2020, but likely due to sedentary lifestyle and poor dietary choices. No progressive s/sxs of HF. Stable Class 2-3 BOSTON. Intermittent edema.   - Would like to repeat Echo and update labs to include BMP and proBNP today, but he is not currently insured. Will hold off for right now. - Continue Losartan 100 mg daily and Metoprolol 50 mg BID  - Discontinued Amlodipine due to hx of edema.   - Discussed regular exercise and low sodium diet. Discussed the role of a Nutritionist due to renal, potassium, and HF diets. He feels ovewhelmed with choices sometimes. - Discussed sleep study - likely has MIKE by STopBang score today. Will defer until insured. - Encouraged daily weight monitoring      2) HTN - systolic BP normal in the office today. DBP elevated likely due to obesity and untreated MIKE. Higinio/renin ratio and metanephrines were OK in the past.    - Continue Losartan and Metoprolol, as above  - Discontinued Amlodipine  - INCREASE Hydralazine to 50 mg TID.    - Continue daily home BP monitoring and encouraged to keep a log     3) Abnormal Troponin 5/6/20 admission -likely due to supply demand mismatch in setting of severe HTN and SURJIT. He denies any exertional chest pain at a good functional capacity. Normal Lexiscan Cardiolite 7.2020.   EKG shows possibility of old anterior infarct. At risk fo CAD in the setting of obesity, dyslipidemia, gender, CKD, IR and HTN.    - Discussed the role of CT heart scan to assess for subclinical CAD. 4) CKD - Stage 3b. Followed by Dr. Ronda Best   - Follow labs     5. Dyslipidemia - last lipids showed  in 5/2020. On Lipitor 40 mg daily.    - Discussed that if CAC was > 0, we should aim for a LDL goal < 70. Likely has IR based on random glucose levels on previous BMP's. - Diet and exercise, as noted above     Return to clinic to see Dr. Radha Cunningham in 3 months. Sooner PRN. Have scheduled Echo for the same day as return visit, but advised him to cancel this test if he does not have insurance at that time. He was encouraged to call back prior to that time with any additional questions or concerns.       RULA Contreras [id number] [facility], [address] [place] [address]  Ph: [phone]

## 2022-01-12 NOTE — PATIENT INSTRUCTIONS
Let's increase you physical activity to help with weight loss. Continue to avoid excessive sodium and watch out for processed. Avoid anything white (flour, rice, potatoes, sweets, alcohol). Continue to hold on taking ASA for now. Consider scheduling a CT heart scan to check for calcium deposits. If you do have calcium or early stages of atherosclerosis I would then ask you to take the ASA again. The cost goes down to $90-addison dollars in the month of February. Discontinue the Amlodipine and Increase your Hydralazine to 50 mg. Continue to monitor BP at home and call me if your top number is not < 140 consistently with this change. We will hold off on the sleep study referral, repeat Echo and lab work at this time due to your lack of insurance, but I would like to have you discuss this with Dr. Douglas Garland when you return again in 3 months.

## 2022-01-12 NOTE — PROGRESS NOTES
Mai Sotelo is a 42 y.o. male    Chief Complaint   Patient presents with    Hypertension    Follow-up     needs refills    Heart Problem     heart failure       Chest pain None    SOB With exertion    Dizziness None    Swelling None    Refills Yes. All cardiac medications. Visit Vitals  BP (!) 132/94   Pulse 74   Resp 18   Ht 5' 8\" (1.727 m)   Wt 288 lb 3.2 oz (130.7 kg)   SpO2 95%   BMI 43.82 kg/m²       1. Have you been to the ER, urgent care clinic since your last visit? Hospitalized since your last visit? No    2. Have you seen or consulted any other health care providers outside of the [address] [place] [address] since your last visit? Include any pap smears or colon screening.  No

## 2022-03-03 DIAGNOSIS — I10 HYPERTENSION, UNSPECIFIED TYPE: ICD-10-CM

## 2022-03-03 DIAGNOSIS — E78.2 MIXED HYPERLIPIDEMIA: ICD-10-CM

## 2022-03-03 DIAGNOSIS — I50.32 CHRONIC DIASTOLIC CONGESTIVE HEART FAILURE (HCC): ICD-10-CM

## 2022-03-03 RX ORDER — LOSARTAN POTASSIUM 100 MG/1
TABLET ORAL
Qty: 30 TABLET | Refills: 3 | Status: SHIPPED | OUTPATIENT
Start: 2022-03-03 | End: 2022-05-13 | Stop reason: SDUPTHER

## 2022-03-03 RX ORDER — AMLODIPINE BESYLATE 10 MG/1
TABLET ORAL
Qty: 14 TABLET | Refills: 0 | OUTPATIENT
Start: 2022-03-03

## 2022-03-03 RX ORDER — METOPROLOL TARTRATE 50 MG/1
TABLET ORAL
Qty: 60 TABLET | Refills: 3 | Status: SHIPPED | OUTPATIENT
Start: 2022-03-03 | End: 2022-06-01

## 2022-03-03 RX ORDER — ATORVASTATIN CALCIUM 40 MG/1
TABLET, FILM COATED ORAL
Qty: 30 TABLET | Refills: 3 | Status: SHIPPED | OUTPATIENT
Start: 2022-03-03 | End: 2022-05-17 | Stop reason: SDUPTHER

## 2022-03-03 RX ORDER — HYDRALAZINE HYDROCHLORIDE 25 MG/1
TABLET, FILM COATED ORAL
Qty: 42 TABLET | Refills: 0 | OUTPATIENT
Start: 2022-03-03

## 2022-03-03 NOTE — TELEPHONE ENCOUNTER
Refill per VO of Dr. Pauline Miller  Last appt: 1/12/2022  Future Appointments   Date Time Provider William Borrero   4/14/2022  3:00 PM ALLYSSA WELCH   4/14/2022  3:40 PM MD JASON Torres IVAN AMB       Requested Prescriptions     Pending Prescriptions Disp Refills    hydrALAZINE (APRESOLINE) 25 mg tablet [Pharmacy Med Name: HYDRALAZINE 25 MG TAB[*]] 42 Tablet 0     Sig: TAKE ONE TABLET BY MOUTH THREE TIMES A DAY    amLODIPine (NORVASC) 10 mg tablet [Pharmacy Med Name: AMLODIPINE 10 MG TAB[*]] 14 Tablet 0     Sig: TAKE ONE TABLET BY MOUTH ONE TIME DAILY --APPOINTMENT NEEDED FOR FURTHER REFILLS     Refill was denied because,  REFILLS STILL AVAILABLE AT THE PHARMACY and AMLODIPINE WAS D/Cd

## 2022-03-18 PROBLEM — R06.09 DYSPNEA ON EXERTION: Status: ACTIVE | Noted: 2020-05-06

## 2022-03-18 PROBLEM — Z91.199 NONCOMPLIANCE: Status: ACTIVE | Noted: 2020-05-09

## 2022-05-09 ENCOUNTER — TELEPHONE (OUTPATIENT)
Dept: CARDIOLOGY CLINIC | Age: 47
End: 2022-05-09

## 2022-05-09 NOTE — TELEPHONE ENCOUNTER
On 5/6/2022 at 11:48 left message for patient to call to provide updated insurance information or to advise if self pay - upcoming appointment on 5/17/2022

## 2022-05-13 DIAGNOSIS — I10 HYPERTENSION, UNSPECIFIED TYPE: ICD-10-CM

## 2022-05-13 DIAGNOSIS — E78.2 MIXED HYPERLIPIDEMIA: ICD-10-CM

## 2022-05-13 DIAGNOSIS — I50.32 CHRONIC DIASTOLIC CONGESTIVE HEART FAILURE (HCC): ICD-10-CM

## 2022-05-13 RX ORDER — LOSARTAN POTASSIUM 100 MG/1
100 TABLET ORAL DAILY
Qty: 90 TABLET | Refills: 1 | Status: SHIPPED | OUTPATIENT
Start: 2022-05-13 | End: 2022-09-20

## 2022-05-13 NOTE — TELEPHONE ENCOUNTER
Refill per VO of Dr. David Ibrahim  Last appt: 1/12/2022  Future Appointments   Date Time Provider William Borrero   8/12/2022  9:00 AM ALLYSSA WELCH AMB   8/12/2022  9:40 AM MD ABEL Jackson BS AMB       Requested Prescriptions     Signed Prescriptions Disp Refills    losartan (COZAAR) 100 mg tablet 90 Tablet 1     Sig: Take 1 Tablet by mouth daily.      Authorizing Provider: Jennings Denver     Ordering User: Manny Holt

## 2022-05-13 NOTE — TELEPHONE ENCOUNTER
Patient is calling because he needs a refill on his atorvastatin 40 mg and losartan 100 mg. Pharmacy is confirmed. patient is completely out. Patient upcoming apt is 8/12/22. Patient has changed jobs and will have new insurance then also    [phone]

## 2022-05-17 DIAGNOSIS — E78.2 MIXED HYPERLIPIDEMIA: ICD-10-CM

## 2022-05-17 DIAGNOSIS — I50.32 CHRONIC DIASTOLIC CONGESTIVE HEART FAILURE (HCC): ICD-10-CM

## 2022-05-17 DIAGNOSIS — I10 HYPERTENSION, UNSPECIFIED TYPE: ICD-10-CM

## 2022-05-17 RX ORDER — ATORVASTATIN CALCIUM 40 MG/1
40 TABLET, FILM COATED ORAL DAILY
Qty: 90 TABLET | Refills: 2 | Status: SHIPPED | OUTPATIENT
Start: 2022-05-17

## 2022-05-17 NOTE — TELEPHONE ENCOUNTER
Refill per VO of Dr. Rcihardson Holloway  Last appt: 1/12/2022  Future Appointments   Date Time Provider William Gissell   8/12/2022  9:00 AM ALLYSSA WELCH   8/12/2022  9:40 AM MD ABEL Youssef       Requested Prescriptions     Signed Prescriptions Disp Refills    atorvastatin (LIPITOR) 40 mg tablet 90 Tablet 2     Sig: Take 1 Tablet by mouth daily.      Authorizing Provider: Raj Del Angel     Ordering User: Tanmay Ibrahim

## 2022-06-01 DIAGNOSIS — I10 HYPERTENSION, UNSPECIFIED TYPE: ICD-10-CM

## 2022-06-01 DIAGNOSIS — I50.32 CHRONIC DIASTOLIC CONGESTIVE HEART FAILURE (HCC): ICD-10-CM

## 2022-06-01 DIAGNOSIS — E78.2 MIXED HYPERLIPIDEMIA: ICD-10-CM

## 2022-06-01 RX ORDER — METOPROLOL TARTRATE 50 MG/1
TABLET ORAL
Qty: 60 TABLET | Refills: 3 | Status: SHIPPED | OUTPATIENT
Start: 2022-06-01

## 2022-06-06 RX ORDER — AMLODIPINE BESYLATE 10 MG/1
TABLET ORAL
Qty: 14 TABLET | Refills: 0 | OUTPATIENT
Start: 2022-06-06

## 2022-06-06 RX ORDER — HYDRALAZINE HYDROCHLORIDE 50 MG/1
50 TABLET, FILM COATED ORAL 3 TIMES DAILY
Qty: 270 TABLET | Refills: 1 | Status: SHIPPED | OUTPATIENT
Start: 2022-06-06 | End: 2022-10-13 | Stop reason: SDUPTHER

## 2022-06-06 NOTE — TELEPHONE ENCOUNTER
Refill per VO of Dr. Bita Mckay  Last appt: 1/12/2022  Future Appointments   Date Time Provider William Borrero   8/12/2022  9:00 AM ALLYSSA WLECH BS AMB   8/12/2022  9:40 AM MD ABEL Duke BS AMB       Requested Prescriptions     Signed Prescriptions Disp Refills    hydrALAZINE (APRESOLINE) 50 mg tablet 270 Tablet 1     Sig: Take 1 Tablet by mouth three (3) times daily.      Authorizing Provider: Hailey Adams     Ordering User: Shara Sepulveda     Refused Prescriptions Disp Refills    amLODIPine (NORVASC) 10 mg tablet [Pharmacy Med Name: AMLODIPINE 10 MG TAB[*]] 14 Tablet 0     Sig: TAKE ONE TABLET BY MOUTH ONE TIME DAILY --APPOINTMENT NEEDED FOR FURTHER REFILLS     Refused By: Shara Sepulveda     Reason for Refusal: Medication Discontinued

## 2022-08-30 DIAGNOSIS — E78.2 MIXED HYPERLIPIDEMIA: ICD-10-CM

## 2022-08-30 DIAGNOSIS — I10 HYPERTENSION, UNSPECIFIED TYPE: ICD-10-CM

## 2022-08-30 DIAGNOSIS — I50.32 CHRONIC DIASTOLIC CONGESTIVE HEART FAILURE (HCC): ICD-10-CM

## 2022-08-31 RX ORDER — AMLODIPINE BESYLATE 10 MG/1
TABLET ORAL
Qty: 14 TABLET | Refills: 0 | OUTPATIENT
Start: 2022-08-31

## 2022-08-31 RX ORDER — HYDRALAZINE HYDROCHLORIDE 25 MG/1
TABLET, FILM COATED ORAL
Qty: 42 TABLET | Refills: 0 | OUTPATIENT
Start: 2022-08-31

## 2022-08-31 RX ORDER — LOSARTAN POTASSIUM 100 MG/1
TABLET ORAL
Qty: 90 TABLET | Refills: 1 | OUTPATIENT
Start: 2022-08-31

## 2022-08-31 NOTE — TELEPHONE ENCOUNTER
Refill per VO of Dr. Saud Jeffries  Last appt: 1/12/2022  Future Appointments   Date Time Provider William Borrero   10/13/2022  2:30 PM ALLYSSA VOSS   10/13/2022  3:40 PM MD ABEL Thornton BS AMB       Requested Prescriptions     Refused Prescriptions Disp Refills    hydrALAZINE (APRESOLINE) 25 mg tablet [Pharmacy Med Name: HYDRALAZINE 25 MG TAB[*]] 42 Tablet 0     Sig: TAKE ONE TABLET BY MOUTH THREE TIMES A DAY     Refused By: Anders Tapia     Reason for Refusal: Patient has requested refill too soon    amLODIPine (NORVASC) 10 mg tablet [Pharmacy Med Name: AMLODIPINE 10 MG TAB[*]] 14 Tablet 0     Sig: TAKE ONE TABLET BY MOUTH ONE TIME DAILY --APPOINTMENT NEEDED FOR FURTHER REFILLS     Refused By: Anders Tapia     Reason for Refusal: Medication Discontinued    losartan (COZAAR) 100 mg tablet [Pharmacy Med Name: Deepa  100 MG TAB[*]] 90 Tablet 1     Sig: TAKE ONE TABLET BY MOUTH ONE TIME DAILY     Refused By: Anders Tapia     Reason for Refusal: Patient has requested refill too soon

## 2022-09-18 DIAGNOSIS — I10 HYPERTENSION, UNSPECIFIED TYPE: ICD-10-CM

## 2022-09-18 DIAGNOSIS — I50.32 CHRONIC DIASTOLIC CONGESTIVE HEART FAILURE (HCC): ICD-10-CM

## 2022-09-18 DIAGNOSIS — E78.2 MIXED HYPERLIPIDEMIA: ICD-10-CM

## 2022-09-20 RX ORDER — LOSARTAN POTASSIUM 100 MG/1
TABLET ORAL
Qty: 90 TABLET | Refills: 1 | Status: SHIPPED | OUTPATIENT
Start: 2022-09-20

## 2022-09-20 NOTE — TELEPHONE ENCOUNTER
Refill per VO of Dr. Kulwinder Rodriguez  Last appt: 1/12/2022  Future Appointments   Date Time Provider William Borrero   10/13/2022 12:30 PM ALLYSSA VOSS   10/13/2022  2:00 PM RULA Dobbins       Requested Prescriptions     Signed Prescriptions Disp Refills    losartan (COZAAR) 100 mg tablet 90 Tablet 1     Sig: TAKE ONE TABLET BY MOUTH ONE TIME DAILY     Authorizing Provider: Juan Jose Ly     Ordering User: Onofre Navarro

## 2022-10-13 ENCOUNTER — OFFICE VISIT (OUTPATIENT)
Dept: CARDIOLOGY CLINIC | Age: 47
End: 2022-10-13

## 2022-10-13 ENCOUNTER — ANCILLARY PROCEDURE (OUTPATIENT)
Dept: CARDIOLOGY CLINIC | Age: 47
End: 2022-10-13
Payer: COMMERCIAL

## 2022-10-13 VITALS
WEIGHT: 284 LBS | BODY MASS INDEX: 43.04 KG/M2 | RESPIRATION RATE: 16 BRPM | DIASTOLIC BLOOD PRESSURE: 86 MMHG | HEIGHT: 68 IN | HEART RATE: 80 BPM | SYSTOLIC BLOOD PRESSURE: 158 MMHG | OXYGEN SATURATION: 94 %

## 2022-10-13 VITALS
SYSTOLIC BLOOD PRESSURE: 184 MMHG | WEIGHT: 284 LBS | HEIGHT: 68 IN | BODY MASS INDEX: 43.04 KG/M2 | DIASTOLIC BLOOD PRESSURE: 84 MMHG

## 2022-10-13 DIAGNOSIS — Z13.1 DIABETES MELLITUS SCREENING: ICD-10-CM

## 2022-10-13 DIAGNOSIS — I10 UNCONTROLLED HYPERTENSION: ICD-10-CM

## 2022-10-13 DIAGNOSIS — E66.01 MORBID OBESITY WITH BMI OF 40.0-44.9, ADULT (HCC): ICD-10-CM

## 2022-10-13 DIAGNOSIS — R06.09 DOE (DYSPNEA ON EXERTION): ICD-10-CM

## 2022-10-13 DIAGNOSIS — I10 UNCONTROLLED HYPERTENSION: Primary | ICD-10-CM

## 2022-10-13 DIAGNOSIS — E78.5 DYSLIPIDEMIA: ICD-10-CM

## 2022-10-13 DIAGNOSIS — I50.32 CHRONIC HEART FAILURE WITH PRESERVED EJECTION FRACTION (HCC): ICD-10-CM

## 2022-10-13 DIAGNOSIS — I50.30 HEART FAILURE WITH PRESERVED EJECTION FRACTION, UNSPECIFIED HF CHRONICITY (HCC): ICD-10-CM

## 2022-10-13 LAB
ECHO AO ASC DIAM: 3.5 CM
ECHO AO ASCENDING AORTA INDEX: 1.48 CM/M2
ECHO AO ROOT DIAM: 3.2 CM
ECHO AO ROOT INDEX: 1.35 CM/M2
ECHO AV AREA PEAK VELOCITY: 3.3 CM2
ECHO AV AREA VTI: 3.5 CM2
ECHO AV AREA/BSA PEAK VELOCITY: 1.4 CM2/M2
ECHO AV AREA/BSA VTI: 1.5 CM2/M2
ECHO AV MEAN GRADIENT: 6 MMHG
ECHO AV MEAN VELOCITY: 1.1 M/S
ECHO AV PEAK GRADIENT: 11 MMHG
ECHO AV PEAK VELOCITY: 1.7 M/S
ECHO AV VELOCITY RATIO: 0.76
ECHO AV VTI: 27.8 CM
ECHO EST RA PRESSURE: 3 MMHG
ECHO LA DIAMETER INDEX: 1.86 CM/M2
ECHO LA DIAMETER: 4.4 CM
ECHO LA TO AORTIC ROOT RATIO: 1.38
ECHO LA VOL 2C: 74 ML (ref 18–58)
ECHO LA VOL 4C: 107 ML (ref 18–58)
ECHO LA VOLUME AREA LENGTH: 110 ML
ECHO LA VOLUME INDEX A2C: 31 ML/M2 (ref 16–34)
ECHO LA VOLUME INDEX A4C: 45 ML/M2 (ref 16–34)
ECHO LA VOLUME INDEX AREA LENGTH: 46 ML/M2 (ref 16–34)
ECHO LV E' LATERAL VELOCITY: 5 CM/S
ECHO LV E' SEPTAL VELOCITY: 5 CM/S
ECHO LV EDV A2C: 171 ML
ECHO LV EDV A4C: 161 ML
ECHO LV EDV BP: 172 ML (ref 67–155)
ECHO LV EDV INDEX A4C: 68 ML/M2
ECHO LV EDV INDEX BP: 73 ML/M2
ECHO LV EDV NDEX A2C: 72 ML/M2
ECHO LV EJECTION FRACTION A2C: 64 %
ECHO LV EJECTION FRACTION A4C: 65 %
ECHO LV EJECTION FRACTION BIPLANE: 65 % (ref 55–100)
ECHO LV ESV A2C: 61 ML
ECHO LV ESV A4C: 56 ML
ECHO LV ESV BP: 59 ML (ref 22–58)
ECHO LV ESV INDEX A2C: 26 ML/M2
ECHO LV ESV INDEX A4C: 24 ML/M2
ECHO LV ESV INDEX BP: 25 ML/M2
ECHO LV FRACTIONAL SHORTENING: 28 % (ref 28–44)
ECHO LV GLOBAL LONGITUDINAL STRAIN (GLS): -12 %
ECHO LV INTERNAL DIMENSION DIASTOLE INDEX: 1.94 CM/M2
ECHO LV INTERNAL DIMENSION DIASTOLIC: 4.6 CM (ref 4.2–5.9)
ECHO LV INTERNAL DIMENSION SYSTOLIC INDEX: 1.39 CM/M2
ECHO LV INTERNAL DIMENSION SYSTOLIC: 3.3 CM
ECHO LV IVSD: 1.4 CM (ref 0.6–1)
ECHO LV MASS 2D: 270.6 G (ref 88–224)
ECHO LV MASS INDEX 2D: 114.2 G/M2 (ref 49–115)
ECHO LV POSTERIOR WALL DIASTOLIC: 1.5 CM (ref 0.6–1)
ECHO LV RELATIVE WALL THICKNESS RATIO: 0.65
ECHO LVOT AREA: 4.2 CM2
ECHO LVOT AV VTI INDEX: 0.81
ECHO LVOT DIAM: 2.3 CM
ECHO LVOT MEAN GRADIENT: 3 MMHG
ECHO LVOT PEAK GRADIENT: 7 MMHG
ECHO LVOT PEAK VELOCITY: 1.3 M/S
ECHO LVOT STROKE VOLUME INDEX: 39.2 ML/M2
ECHO LVOT SV: 93 ML
ECHO LVOT VTI: 22.4 CM
ECHO MV A VELOCITY: 0.6 M/S
ECHO MV AREA PHT: 3.4 CM2
ECHO MV AREA VTI: 5 CM2
ECHO MV E DECELERATION TIME (DT): 224.9 MS
ECHO MV E VELOCITY: 0.68 M/S
ECHO MV E/A RATIO: 1.13
ECHO MV E/E' LATERAL: 13.6
ECHO MV E/E' RATIO (AVERAGED): 13.6
ECHO MV E/E' SEPTAL: 13.6
ECHO MV LVOT VTI INDEX: 0.83
ECHO MV MAX VELOCITY: 0.9 M/S
ECHO MV MEAN GRADIENT: 2 MMHG
ECHO MV MEAN VELOCITY: 0.6 M/S
ECHO MV PEAK GRADIENT: 4 MMHG
ECHO MV PRESSURE HALF TIME (PHT): 65.2 MS
ECHO MV VTI: 18.6 CM
ECHO RV INTERNAL DIMENSION: 3.8 CM
ECHO RV TAPSE: 1.8 CM (ref 1.7–?)

## 2022-10-13 PROCEDURE — 99214 OFFICE O/P EST MOD 30 MIN: CPT | Performed by: NURSE PRACTITIONER

## 2022-10-13 PROCEDURE — 93356 MYOCRD STRAIN IMG SPCKL TRCK: CPT | Performed by: SPECIALIST

## 2022-10-13 PROCEDURE — 93306 TTE W/DOPPLER COMPLETE: CPT | Performed by: SPECIALIST

## 2022-10-13 RX ORDER — HYDRALAZINE HYDROCHLORIDE 100 MG/1
100 TABLET, FILM COATED ORAL 3 TIMES DAILY
Qty: 90 TABLET | Refills: 0 | Status: SHIPPED | OUTPATIENT
Start: 2022-10-13

## 2022-10-13 NOTE — PROGRESS NOTES
Room EP 2    Chief Complaint   Patient presents with    CHF    Hypertension    Cholesterol Problem     Visit Vitals  BP (!) 158/86 (BP 1 Location: Left upper arm, BP Patient Position: Sitting, BP Cuff Size: Large adult) Comment: BP rechecked   Pulse 80   Resp 16   Ht 5' 8\" (1.727 m)   Wt 284 lb (128.8 kg)   SpO2 94% Comment: rechecked   BMI 43.18 kg/m²           Chest pain: episode last Saturday after walking up hill    Shortness of breath: no    Edema: slight lower legs    Palpitations, Skipped beats, Rapid heartbeat: no    Dizziness: no    Fatigue:yes, slight    New diagnosis/Surgeries: no    1. Have you been to the ER, urgent care clinic since your last visit? Hospitalized since your last visit? No      2. Have you seen or consulted any other health care providers outside of the [address] [place] [address] since your last visit? Include any pap smears or colon screening.  No     Refills:no

## 2022-10-13 NOTE — PATIENT INSTRUCTIONS
Check BP twice daily for two week - keep log     Get labs today     Increase hydralazine to 100mg three times per day   See Dr. Denny Hobbs in 1 mo

## 2022-10-13 NOTE — LETTER
10/18/2022    Patient: Jeremy Fatima   YOB: 1975   Date of Visit: 10/13/2022     Angie Shen, [address] [address]  [address]ia [address] [facility], [address] [facility]  [address] [facility] [address]  [address] [facility]  [address] [facility] [address]    Dear MD Baljeet Allen MD,      Thank you for referring Mr. Jeremy Fatima to [address]  for evaluation. My notes for this consultation are attached. If you have questions, please do not hesitate to call me. I look forward to following your patient along with you.       Sincerely,    Daphnie Polanco NP

## 2022-10-13 NOTE — PROGRESS NOTES
STEVE Webber  [address]  [place]  [place], [address] [facility]    Office [phone]  Fax [phone]        Patient: Luis Lim  : 1975    Today's Date: 10/13/2022        HISTORY OF PRESENT ILLNESS:     Here for routine fu. Prev seen by Halle Cameron in [place].      Patient denies any exertional chest pain, breathing issues, palpitations, syncope, or paroxysmal nocturnal dyspnea. Has BOSTON with longer periods of exertion / walking - states this is stable. Walks for exercise. Has some CORA end of day which resolves overnight. Taking meds as rx. Unsure of home BP but elevated when recently checked by others. PAST MEDICAL HISTORY:     Past Medical History:   Diagnosis Date    (HFpEF) heart failure with preserved ejection fraction (HCC)     SURJIT (acute kidney injury) ([facility] [address] [facility] 5        CHF (congestive heart failure) ([facility] [address] [facility]         HLD (hyperlipidemia)     Hypertension     Uncontrolled HTN        Past Surgical History:   Procedure Laterality Date    HX OTHER SURGICAL      testicular surgery in childhood       MEDICATIONS:     Current Outpatient Medications   Medication Sig Dispense Refill    losartan (COZAAR) 100 mg tablet TAKE ONE TABLET BY MOUTH ONE TIME DAILY 90 Tablet 1    hydrALAZINE (APRESOLINE) 50 mg tablet Take 1 Tablet by mouth three (3) times daily. 270 Tablet 1    metoprolol tartrate (LOPRESSOR) 50 mg tablet TAKE ONE TABLET BY MOUTH EVERY 12 HOURS 60 Tablet 3    atorvastatin (LIPITOR) 40 mg tablet Take 1 Tablet by mouth daily.  90 Tablet 2       Allergies   Allergen Reactions    Benadryl [Diphenhydramine Hcl] Hives       SOCIAL HISTORY:     Social History     Tobacco Use    Smoking status: Never    Smokeless tobacco: Never   Substance Use Topics    Alcohol use: Never    Drug use: Never       FAMILY HISTORY:     Family History   Problem Relation Age of Onset    Asthma Sister     Asthma Brother      REVIEW OF SYMPTOMS:     (Positive findings above)  Constitutional: Negative for fever, chills, and diaphoresis. Respiratory: Negative for cough, hemoptysis, sputum production, and wheezing. Cardiovascular: Negative for chest pain, palpitations, and PND. Gastrointestinal: Negative for nausea, vomiting, blood in stool and melena. Genitourinary: Negative for dysuria and flank pain. Neurological: Negative for focal weakness, seizures, loss of consciousness  Endo/Heme/Allergies: Negative for abnormal bleeding. Psychiatric/Behavioral: Negative for memory loss. PHYSICAL EXAM:     Visit Vitals  BP (!) 184/84   Pulse 82   Ht 5' 8\" (1.727 m)   Wt 284 lb (128.8 kg)   BMI 43.18 kg/m²      Wt Readings from Last 3 Encounters:   10/13/22 284 lb (128.8 kg)   10/13/22 284 lb (128.8 kg)   01/12/22 288 lb 3.2 oz (130.7 kg)     Patient appears generally well, mood and affect are appropriate and pleasant. HEENT:  Hearing intact, non-icteric, normocephalic, atraumatic. Neck Exam: Supple, No JVD   Lung Exam: Clear to auscultation, even breath sounds. Cardiac Exam: Regular rate and rhythm with no murmur or rub  Abdomen: Soft, non-tender, + obese . Extremities: Moves all ext well. Mild bilat lower extremity edema. Psych: Appropriate affect  Neuro - Grossly intact    LABS / OTHER STUDIES:     Lab Results   Component Value Date/Time    Sodium 136 05/09/2020 03:32 AM    Potassium 3.8 05/09/2020 03:32 AM    Chloride 102 05/09/2020 03:32 AM    CO2 29 05/09/2020 03:32 AM    Anion gap 5 05/09/2020 03:32 AM    Glucose 123 (H) 05/09/2020 03:32 AM    BUN 36 (H) 05/09/2020 03:32 AM    Creatinine 2.47 (H) 05/09/2020 03:32 AM    BUN/Creatinine ratio 15 05/09/2020 03:32 AM    GFR est AA 35 (L) 05/09/2020 03:32 AM    GFR est non-AA 29 (L) 05/09/2020 03:32 AM    Calcium 8.3 (L) 05/09/2020 03:32 AM    Bilirubin, total 0.8 05/07/2020 02:12 AM    Alk.  phosphatase 89 05/07/2020 02:12 AM    Protein, total 6.1 05/07/2020 09:36 AM    Albumin 3.6 05/07/2020 02:12 AM    Globulin 3.9 05/07/2020 02:12 AM    A-G Ratio 0.9 (L) 05/07/2020 02:12 AM    ALT (SGPT) 49 05/07/2020 02:12 AM     Lab Results   Component Value Date/Time    WBC 10.4 05/08/2020 01:52 AM    HGB 13.3 05/08/2020 01:52 AM    HCT 40.5 05/08/2020 01:52 AM    PLATELET 416 43/31/6469 01:52 AM    MCV 85.1 05/08/2020 01:52 AM     Lab Results   Component Value Date/Time    Cholesterol, total 290 (H) 05/07/2020 02:12 AM    HDL Cholesterol 68 05/07/2020 02:12 AM    LDL, calculated 184 (H) 05/07/2020 02:12 AM    VLDL, calculated 38 05/07/2020 02:12 AM    Triglyceride 190 (H) 05/07/2020 02:12 AM    CHOL/HDL Ratio 4.3 05/07/2020 02:12 AM       CARDIAC DIAGNOSTICS:       Component      Latest Ref Rng & Units 5/7/2020 5/7/2020           9:36 AM  9:36 AM   Renin Activity      0.167 - 5.380 ng/mL/hr 7.417 (H)    Aldosterone      0.0 - 30.0 ng/dL 22.0    Normetanephrine, free      0 - 145 pg/mL  135   Metanephrine, free      0 - 62 pg/mL  59     Echo 5/7/20 - severe LVH, LVEF 60-65%    Abd U/S 5/7/20 - 1. Increased parenchymal echogenicity of the kidneys, consistent with medical renal disease. No hydronephrosis. 2. Suggested 9 mm nonobstructing stone in the lower pole of the right kidney. Lexiscan Cardiolite 7/1/20 -  Baseline ECG: Normal sinus rhythm, Old anterior infarct. Q wave in V3. Non-specific T wave abn. There was no ST segment deviation noted during stress. Gated SPECT: Moderately dilated left ventricle. LVEF 44%. Nuclear Perfusion: Left ventricular perfusion is probably normal. There is a moderate sized, moderate grade, fixed defect involving the inferior wall and this is likely due to abdominal attenuation. No significant inducible ischemia. SDS = 2. Myocardial perfusion imaging supports a low risk stress test for ischemia. Echo today (results pending)    ASSESSMENT AND PLAN:     HFpEF  - Continue Losartan 100 mg daily and Metoprolol 50 mg BID  - prev dc'd Amlodipine due to edema.    - needs improved control of BP - further titrate hydralazine to 100mg tid   - echo today (results pending)    HTN - elevated - increasing hydralazine per above    CKD - prev stage 3b. Followed by Dr. Amita Aceves   - Follow labs     Dyslipidemia - on Lipitor therapy     Morbid Obesity - BMI 43, +snores - discussed need for improved diet / weight loss - exercise as able - will also refer to sleep center for eval of possible sleep apnea     Check updated labs - CBC, CBP, lipids, proBNP, and HgbA1c    Fu in 1mo or PRN; phone fu with testing results     He was encouraged to call back prior to that time with any additional questions or concerns.       Sharon Dance, NP

## 2022-10-19 ENCOUNTER — PATIENT MESSAGE (OUTPATIENT)
Dept: SLEEP MEDICINE | Age: 47
End: 2022-10-19

## 2022-10-24 ENCOUNTER — TELEPHONE (OUTPATIENT)
Dept: SLEEP MEDICINE | Age: 47
End: 2022-10-24

## 2022-10-27 ENCOUNTER — TELEPHONE (OUTPATIENT)
Dept: CARDIOLOGY CLINIC | Age: 47
End: 2022-10-27

## 2022-10-27 NOTE — TELEPHONE ENCOUNTER
Kimberly -   Lab results from 10/18/22 - given abn results and new dx of DM, pls call pt. Not positive he has checked my chart and don't want it overlooked by accident. Thanks!

## 2022-10-28 ENCOUNTER — PATIENT MESSAGE (OUTPATIENT)
Dept: SLEEP MEDICINE | Age: 47
End: 2022-10-28

## 2022-11-09 ENCOUNTER — TELEPHONE (OUTPATIENT)
Dept: FAMILY MEDICINE CLINIC | Age: 47
End: 2022-11-09

## 2022-11-09 NOTE — TELEPHONE ENCOUNTER
Attempted to reach patient to get him schedule for an appointment. Left message with call back number and also informed patient he can send us a WHObyYOUt message.

## 2022-11-09 NOTE — TELEPHONE ENCOUNTER
----- Message from Katie Cheney LPN sent at 84/5/5047 12:10 PM EST -----  Regarding: appt please  Dr. Jimenez Estrella said-- Can you help Get Mr. Toñito Ye in soon? Melina Phalen be one of the residents, but should be in-person. I sent him 2 [employer] messages and he read them both but has not responded.     The appt is to discuss  Type II DM, HgbA1c of 9.2% or just write discuss abnormal labs

## 2023-02-17 ENCOUNTER — OFFICE VISIT (OUTPATIENT)
Dept: FAMILY MEDICINE CLINIC | Age: 48
End: 2023-02-17
Payer: COMMERCIAL

## 2023-02-17 VITALS
WEIGHT: 265 LBS | BODY MASS INDEX: 40.29 KG/M2 | DIASTOLIC BLOOD PRESSURE: 78 MMHG | HEART RATE: 69 BPM | SYSTOLIC BLOOD PRESSURE: 129 MMHG | OXYGEN SATURATION: 94 % | TEMPERATURE: 98.4 F

## 2023-02-17 DIAGNOSIS — E11.22 TYPE 2 DIABETES MELLITUS WITH STAGE 3A CHRONIC KIDNEY DISEASE, WITHOUT LONG-TERM CURRENT USE OF INSULIN (HCC): Primary | ICD-10-CM

## 2023-02-17 DIAGNOSIS — N18.31 TYPE 2 DIABETES MELLITUS WITH STAGE 3A CHRONIC KIDNEY DISEASE, WITHOUT LONG-TERM CURRENT USE OF INSULIN (HCC): Primary | ICD-10-CM

## 2023-02-17 DIAGNOSIS — E78.5 HYPERLIPIDEMIA, UNSPECIFIED HYPERLIPIDEMIA TYPE: ICD-10-CM

## 2023-02-17 RX ORDER — HYDRALAZINE HYDROCHLORIDE 50 MG/1
TABLET, FILM COATED ORAL
COMMUNITY
Start: 2023-02-01

## 2023-02-17 RX ORDER — METFORMIN HYDROCHLORIDE 500 MG/1
500 TABLET ORAL 2 TIMES DAILY WITH MEALS
Qty: 180 TABLET | Refills: 0 | Status: SHIPPED | OUTPATIENT
Start: 2023-02-17 | End: 2023-05-18

## 2023-02-17 NOTE — PATIENT INSTRUCTIONS
The essentials of losing weight and a diabetic lower carbohydrate diet. Exercise  You should exercise 45- 60 minutes every day. This reduces the stored energy in your muscles and allows your insulin level to fall. You can only lose weight when your insulin level is low. Then you burn stored energy. 2.  Fasting   a. You should fast every night from 12-14 hours. b.  Benjiman Clock are still using energy at night when you are sleeping and will burn stored energy. c.  Fasting allows you burn stored energy in your internal organs such as the liver. This allows the insulin level to drop.   d.  This allows you to start losing weight. 3.  No sugar!   a. You should try to eliminate sugar from your diet. b.  First, eliminate sweet drinks including:  sodas and reji, sports drinks (like Gatorade or Poweraid), sweet tea and lemonade, wine (and beer), fruit juice (contains fruit sugar) and milk (contains milk sugar). c.  Eliminate sugary cereals, cookies and candies. No donuts, pastries or coffee cake. d.  Eat desserts only on special occasions:  family reunions, birthdays, anniversaries, major holidays. Eat only small desserts!   e. Start watching labels for foods that have added sugars. 4. Limit starches   a. Limit starches particularly:  bread, noodles, pasta, crackers and chips, rice, potatoes and fries. b.  Watch out for starchy vegetables:  corn and peas. c.  Women can have 30-45 grams of carbs per meal   d. Men can have 45-60 grams of carbs per meal   e. One piece of bread has 15-20 grams of carbs   f. One half cup of oatmeal, corn, rice, peas and cooked pasta have about 15 grams of carbs. 5.  Fruit-special case   a. Fruit has nutrients we need such as Vitamin C but also contains a lot of fruit sugar (fructose)   b. Fruit is not a good snack because fructose does not suppress your appetite. c.  Fruit can cause progression of fatty liver disease which makes weight loss harder   d.   Limit yourself to one serving of fruit per day and try to eat berries, small apples, oranges, warren or grapefruit.           e.  Avoid high sugar fruits including mangos, bananas, grapes and cherries. f.  One serving of fruit is 1/2 to 3/4 of a cup.    6.  What do I eat instead?   a. Eat protein. It curbs your appetite longer than carbs do anyway. Eat meat, chicken, fish and eggs. b.  Eat healthy fats:  fish, olive oil, nuts   c. Eat more vegetables and salads. d.  Eat all of the above before you eat any carbs.   e.  Eat slowly and enjoy your food. f.  Drink more water. 7.  Snacks   a. Good snacks:  Cheese, nuts, Kind bars (minis), Protein bars, carrot sticks, celery sticks. b.  Read labels and look for snacks with low amounts of carbohydrate per serving (10 or less)   c. Try not to eat between meals. You'll lose more weight if you are not constantly putting energy into the system. 8.  Accountability   a. You have to keep yourself honest about your diet efforts. You need reminders to stick to your change in lifestyle and diet. b.  Weigh yourself daily   c.  Record your food intake either on an troy or in writing.   d.  Record your exercise. 9.  Support and emotional health. a.  Surround yourself with people that will help you and support your efforts. b. Avoid people that may sabotage your efforts or insist that they at least not undermine you.  c.  Be patient. An average patient loses only 3 pounds a month but that's 36 pounds at the end of a year. d.  Think long term. Try to keep up good exercise and diet habits. Once you get the weight off, keep it off.  e.  Pay attention to your emotions about food and your weight. Have a healthy attitude towards yourself and your body. 10.  When do I get to go back to eating the way I did before? Answer: Never. If you resume your old patterns of eating that caused your weight gain, you'll just gain the weight back.      Try to make permanent changes in how you live every day. It's not easy but you can do it.

## 2023-02-17 NOTE — PROGRESS NOTES
Identified pt with two pt identifiers(name and ). Reviewed record in preparation for visit and have obtained necessary documentation. All patient medications has been reviewed. Chief Complaint   Patient presents with    Establish Care     Recently diagnosed with Diabetes, chronic kidney disease - nephrologist Dr Dominic Krishnao:    23 1503 23 1505   BP: (!) 147/80 129/78   Pulse: 69    Temp: 98.4 °F (36.9 °C)    TempSrc: Oral    SpO2: 94%    Weight: 265 lb (120.2 kg)      1. Have you been to the ER, urgent care clinic since your last visit? Hospitalized since your last visit? No    2. Have you seen or consulted any other health care providers outside of the [address] [place] [address] since your last visit? Include any pap smears or colon screening.  No

## 2023-02-17 NOTE — PROGRESS NOTES
Chief Complaint/Subjective:   HPI:  Raad Kingsley is a 41 y.o. male that presents for:   Chief Complaint   Patient presents with    Establish Care     Recently diagnosed with Diabetes, chronic kidney disease - nephrologist Dr Sharon Galvan     # T2DM:  - Found to have an elevated A1C in 10/2022 by his cardiology NP -- was not started on any medications. - Previously had bouts of polydipsia, denies polyuria  - Previously felt weak after his hydral dose was adjusted 100 mg TID by his cardiologist. However, returned back to his home dosage with complete resolution of his weakness.      DIABETIC CARE CHECKLIST   - Patient on ASA: No  - Patient on Statin: Yes  - Patient on ARB: Yes  - Diet: Patient has been trying to eliminate sugars and increase fiber intake/vegetables, but does eat \"junk food\" on weekends, limited white rice, pastas, soft drinks and juices  - Exercise: Mostly walking  - Blood pressure today: 129/78  - Last ophthalmology evaluation: never been seen  - Last cholesterol check:   Lab Results   Component Value Date/Time    Cholesterol, total 164 10/13/2022 03:23 PM    HDL Cholesterol 42 10/13/2022 03:23 PM    LDL, calculated 91.2 10/13/2022 03:23 PM    VLDL, calculated 30.8 10/13/2022 03:23 PM    Triglyceride 154 (H) 10/13/2022 03:23 PM    CHOL/HDL Ratio 3.9 10/13/2022 03:23 PM     - ASCVD: The 10-year ASCVD risk score (Marianna DK, et al., 2019) is: 6.9%    Values used to calculate the score:      Age: 41 years      Sex: Male      Is Non- : Yes      Diabetic: No      Tobacco smoker: No      Systolic Blood Pressure: 302 mmHg      Is BP treated: Yes      HDL Cholesterol: 42 MG/DL      Total Cholesterol: 164 MG/DL  - Last HbA1c:   Lab Results   Component Value Date/Time    Hemoglobin A1c 9.2 (H) 10/13/2022 03:23 PM     - Last urine microalbulmin:   No results found for: MCACR, MCA1, MCA2, MCA3, MCAU, MCAU2, MCALPOCT  - Last comprehensive foot exam: never completed  - Did patient receive pneumococcal vaccine: never done  - Does the patient have a nephrologist: See's regularly, Dr. Byrd Hands > plans to see sometime next week  - Does the patient have a cardiologist: Dr. Kan Choudhury, 6/1/2023  - Seasonal influenza vaccine: denies    ROS:   Review of Systems   Constitutional: Negative. Negative for chills and fever. Respiratory: Negative. Negative for cough and shortness of breath. Cardiovascular: Negative. Negative for chest pain. Gastrointestinal: Negative. Negative for abdominal pain, diarrhea, nausea and vomiting. Genitourinary: Negative. Negative for dysuria, frequency and urgency. Endo/Heme/Allergies:  Negative for polydipsia. Health Maintenance:  Health Maintenance Due   Topic Date Due    Pneumococcal 0-64 years (1 - PCV) Never done    DTaP/Tdap/Td series (1 - Tdap) Never done    COVID-19 Vaccine (3 - Booster for Moderna series) 06/25/2021    Colorectal Cancer Screening Combo  Never done    Flu Vaccine (1) Never done    A1C test (Diabetic or Prediabetic)  01/13/2023        Past medical history, social history, and medications personally reviewed. Past Medical History:   Diagnosis Date    (HFpEF) heart failure with preserved ejection fraction (HCC)     SURJIT (acute kidney injury) ([facility] [address] 5    5/20    CHF (congestive heart failure) ([facility] [address] [facility]     5/20    HLD (hyperlipidemia)     Hypertension     Uncontrolled HTN 5/20        Allergies personally reviewed. Allergies   Allergen Reactions    Benadryl [Diphenhydramine Hcl] Hives          Objective:   Vitals reviewed. Visit Vitals  /78   Pulse 69   Temp 98.4 °F (36.9 °C) (Oral)   Wt 265 lb (120.2 kg)   SpO2 94%   BMI 40.29 kg/m²        Physical Exam  General appearance - alert, well appearing, and in no distress  Mouth - mucous membranes moist, pharynx normal without lesions  Neck - supple, no significant adenopathy  Chest - normal chest excursion, normal inspiratory and expiratory function. Clear to ausculation bilaterally.     Heart - normal rate, regular rhythm, normal S1, S2, no murmurs, rubs, clicks or gallops, no extremity edema    Assessment/Plan:   Wil Sargent is here for new diabetes management. Diagnoses and all orders for this visit:    1. Type 2 diabetes mellitus with stage 3a chronic kidney disease, without long-term current use of insulin ([facility] [address]): Newly diagnosed -- A1c noted to be 9.2 in 10/2022. Patient not on any medication. Patient with [place] Romp -- no dosage adjustment needed for Metformin -- will initiate at lower dosage, but monitor BMP's every 3-6 months. He is followed by a nephrologist. Will otherwise complete initial diabetes work-up and re-check A1c.   -     REFERRAL TO OPHTHALMOLOGY  -     HEMOGLOBIN A1C WITH EAG; Future  -     MICROALBUMIN, UR, RAND W/ MICROALB/CREAT RATIO; Future  -     METABOLIC PANEL, COMPREHENSIVE; Future  -     CBC W/O DIFF; Future  -     metFORMIN (GLUCOPHAGE) 500 mg tablet; Take 1 Tablet by mouth two (2) times daily (with meals) for 90 days. 2. Hyperlipidemia, unspecified hyperlipidemia type: Chronic. Prior LDL above goal. Will re-check lipid panel given changes to diet -- may need to increase statin. -     LIPID PANEL; Future         Follow up: Follow-up and Dispositions    Return in about 1 week (around 2/24/2023) for complete physical with Dr. Pravin Villegas (1-2 weeks f/up). Pt was discussed with Dr. Warden Gitelman (attending physician). I have reviewed pertinent labs results and other data. I have discussed the diagnosis with the patient and the intended plan as seen in the above orders. The patient has received an after-visit summary and questions were answered concerning future plans. I have discussed medication side effects and warnings with the patient as well.     Garcia Pérez MD  [facility]

## 2023-02-18 LAB
ALBUMIN SERPL-MCNC: 4.1 G/DL (ref 3.5–5)
ALBUMIN/GLOB SERPL: 1.1 (ref 1.1–2.2)
ALP SERPL-CCNC: 77 U/L (ref 45–117)
ALT SERPL-CCNC: 33 U/L (ref 12–78)
ANION GAP SERPL CALC-SCNC: 6 MMOL/L (ref 5–15)
AST SERPL-CCNC: 18 U/L (ref 15–37)
BILIRUB SERPL-MCNC: 0.5 MG/DL (ref 0.2–1)
BUN SERPL-MCNC: 20 MG/DL (ref 6–20)
BUN/CREAT SERPL: 11 (ref 12–20)
CALCIUM SERPL-MCNC: 9.5 MG/DL (ref 8.5–10.1)
CHLORIDE SERPL-SCNC: 104 MMOL/L (ref 97–108)
CHOLEST SERPL-MCNC: 136 MG/DL
CO2 SERPL-SCNC: 28 MMOL/L (ref 21–32)
CREAT SERPL-MCNC: 1.74 MG/DL (ref 0.7–1.3)
CREAT UR-MCNC: 226 MG/DL
ERYTHROCYTE [DISTWIDTH] IN BLOOD BY AUTOMATED COUNT: 12.4 % (ref 11.5–14.5)
EST. AVERAGE GLUCOSE BLD GHB EST-MCNC: 146 MG/DL
GLOBULIN SER CALC-MCNC: 3.6 G/DL (ref 2–4)
GLUCOSE SERPL-MCNC: 131 MG/DL (ref 65–100)
HBA1C MFR BLD: 6.7 % (ref 4–5.6)
HCT VFR BLD AUTO: 42 % (ref 36.6–50.3)
HDLC SERPL-MCNC: 42 MG/DL
HDLC SERPL: 3.2 (ref 0–5)
HGB BLD-MCNC: 13.6 G/DL (ref 12.1–17)
LDLC SERPL CALC-MCNC: 65.6 MG/DL (ref 0–100)
MCH RBC QN AUTO: 27.5 PG (ref 26–34)
MCHC RBC AUTO-ENTMCNC: 32.4 G/DL (ref 30–36.5)
MCV RBC AUTO: 85 FL (ref 80–99)
MICROALBUMIN UR-MCNC: 214 MG/DL
MICROALBUMIN/CREAT UR-RTO: 947 MG/G (ref 0–30)
NRBC # BLD: 0 K/UL (ref 0–0.01)
NRBC BLD-RTO: 0 PER 100 WBC
PLATELET # BLD AUTO: 246 K/UL (ref 150–400)
PMV BLD AUTO: 11.2 FL (ref 8.9–12.9)
POTASSIUM SERPL-SCNC: 4.3 MMOL/L (ref 3.5–5.1)
PROT SERPL-MCNC: 7.7 G/DL (ref 6.4–8.2)
RBC # BLD AUTO: 4.94 M/UL (ref 4.1–5.7)
SODIUM SERPL-SCNC: 138 MMOL/L (ref 136–145)
TRIGL SERPL-MCNC: 142 MG/DL (ref ?–150)
VLDLC SERPL CALC-MCNC: 28.4 MG/DL
WBC # BLD AUTO: 7.9 K/UL (ref 4.1–11.1)

## 2023-02-19 PROBLEM — N18.31 TYPE 2 DIABETES MELLITUS WITH STAGE 3A CHRONIC KIDNEY DISEASE, WITHOUT LONG-TERM CURRENT USE OF INSULIN (HCC): Status: ACTIVE | Noted: 2023-02-19

## 2023-02-19 PROBLEM — E11.22 TYPE 2 DIABETES MELLITUS WITH STAGE 3A CHRONIC KIDNEY DISEASE, WITHOUT LONG-TERM CURRENT USE OF INSULIN (HCC): Status: ACTIVE | Noted: 2023-02-19

## 2023-04-06 NOTE — TELEPHONE ENCOUNTER
Refill per VO of Dr. Liza Cruz  Last appt: 10/13/2022  Future Appointments   Date Time Provider William Borrero   6/1/2023  4:00 PM Robin Diane MD [facility]       Requested Prescriptions     Signed Prescriptions Disp Refills    losartan (COZAAR) 100 mg tablet 90 Tablet 1     Sig: TAKE ONE TABLET BY MOUTH ONE TIME DAILY     Authorizing Provider: Betarice Sarmiento     Ordering User: Bunny Michael

## 2023-04-24 ENCOUNTER — TELEPHONE (OUTPATIENT)
Dept: CARDIOLOGY CLINIC | Age: 48
End: 2023-04-24

## 2023-04-24 DIAGNOSIS — I10 HYPERTENSION, UNSPECIFIED TYPE: ICD-10-CM

## 2023-04-24 DIAGNOSIS — E78.2 MIXED HYPERLIPIDEMIA: ICD-10-CM

## 2023-04-24 DIAGNOSIS — I50.32 CHRONIC DIASTOLIC CONGESTIVE HEART FAILURE (HCC): ICD-10-CM

## 2023-04-24 NOTE — TELEPHONE ENCOUNTER
[facility] is calling because the patient is requesting a refill for atorvastatin 40 mg.Previous medication was under NP Deanne Boateng. Pt put the request in on 4/8/23. Pt is completely out of his medicine.     [phone] pharmacy  [phone] fax

## 2023-04-25 RX ORDER — ATORVASTATIN CALCIUM 40 MG/1
40 TABLET, FILM COATED ORAL DAILY
Qty: 90 TABLET | Refills: 1 | Status: SHIPPED | OUTPATIENT
Start: 2023-04-25

## 2023-04-25 NOTE — TELEPHONE ENCOUNTER
Refill per VO of Dr. Marie Butler  Last appt: 10/13/2022  Future Appointments   Date Time Provider William Borrero   6/1/2023  4:00 PM Samantha Butcher MD [facility]       Requested Prescriptions     Signed Prescriptions Disp Refills    atorvastatin (LIPITOR) 40 mg tablet 90 Tablet 1     Sig: Take 1 Tablet by mouth daily.      Authorizing Provider: Petar Ortiz User: Thomas Riley

## 2023-10-10 ENCOUNTER — TELEPHONE (OUTPATIENT)
Age: 48
End: 2023-10-10

## 2023-10-10 RX ORDER — ATORVASTATIN CALCIUM 40 MG/1
40 TABLET, FILM COATED ORAL DAILY
Qty: 90 TABLET | Refills: 0 | Status: SHIPPED | OUTPATIENT
Start: 2023-10-10

## 2023-10-10 RX ORDER — LOSARTAN POTASSIUM 100 MG/1
100 TABLET ORAL DAILY
Qty: 90 TABLET | Refills: 0 | Status: SHIPPED | OUTPATIENT
Start: 2023-10-10

## 2023-10-10 NOTE — TELEPHONE ENCOUNTER
Refill per VO of Dr. Ethan Brewer  Last appt: 10/13/22  No show 8/24/23    NV needed x1 mo. Requested Prescriptions     Signed Prescriptions Disp Refills    atorvastatin (LIPITOR) 40 MG tablet 90 tablet 0     Sig: Take 1 tablet by mouth daily Appointment needed with general Cardiology for further refills. Authorizing Provider: Jessica Sensor     Ordering User: Chon Baig    losartan (COZAAR) 100 MG tablet 90 tablet 0     Sig: Take 1 tablet by mouth daily Appointment needed with general Cardiology for further refills.      Authorizing Provider: Jessica Sensor     Ordering User: Chon Baig

## 2024-02-01 RX ORDER — ATORVASTATIN CALCIUM 40 MG/1
40 TABLET, FILM COATED ORAL DAILY
Qty: 30 TABLET | Refills: 0 | Status: SHIPPED | OUTPATIENT
Start: 2024-02-01

## 2024-02-01 NOTE — TELEPHONE ENCOUNTER
Refill per VO of Dr. King  Last appt: Visit date not found    No future appointments.    Requested Prescriptions     Signed Prescriptions Disp Refills    atorvastatin (LIPITOR) 40 MG tablet 30 tablet 0     Sig: Take 1 tablet by mouth daily Appointment needed with general Cardiology for further refills.     Authorizing Provider: JASWANT KING     Ordering User: TRAVIS GONZALEZ

## 2024-06-21 RX ORDER — LOSARTAN POTASSIUM 100 MG/1
100 TABLET ORAL DAILY
Qty: 90 TABLET | Refills: 0 | OUTPATIENT
Start: 2024-06-21

## 2024-06-21 NOTE — TELEPHONE ENCOUNTER
Refill per VO of Dr. King  Last appt: 10/13/22    No show 8/24/23  NV needed x1 mo    Requested Prescriptions     Refused Prescriptions Disp Refills    losartan (COZAAR) 100 MG tablet 90 tablet 0     Sig: Take 1 tablet by mouth daily Appointment needed with general Cardiology for further refills.     Refused By: GOVIND THOMPSON     Reason for Refusal: Patient needs an appointment

## 2025-06-12 NOTE — TELEPHONE ENCOUNTER
Name: Shai Osborne      : 1941      MRN: [id number]  Encounter Provider: Shayy Carlton MD  Encounter Date: 2025   Encounter department: [facility]  :  Assessment & Plan  Mild cognitive impairment  Patient with MCI. She has difficulty mostly with short term memory. Her grand daughter has been helping take care of her however patient is still indpendent in ADLs and still drives without a problem. States she takes 4-5 seconds longer to think of things. Patient with good insight. Patient likely with an MCI 2/2 to microangiopathic changes on previous MRI vs normal aging. Will continue current management. I discussed concerning vs not concerning signs and symptoms to watch out for. Follow up in a year or sooner if needed.        Internal carotid artery stenosis  Patient with right ICA stenosis. Maintained on aspirin. Denies any new stroke like sx. Continue current management including taking daily aspirin 81 mg.  Any new stroke like symptom should warrant immediate presentation to the closest ED.              History of Present Illness   HPI   Quinton Osborne is a very pleasant 83 y.o. 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       Initial visit (3/19/2024):     Patient reports that over the last year she started noticing a gradual decline in her cognition.  She was mostly reports lapses of memory that are brief causing confusion, disorientation and forgetfulness.  This has resulted in certain situations which could be noted as dangerous such as forgetting to lock the door, leaving the water running in the sink resulting in the kitchen flooding or briefly not knowing which direction she has to  Refill per VO of Dr. Amira Adams  Last appt: Visit date not found  No future appointments.     Requested Prescriptions     Pending Prescriptions Disp Refills    amLODIPine (NORVASC) 10 mg tablet [Pharmacy Med Name: AMLODIPINE 10 MG TAB[*]] 90 Tablet 0     Sig: TAKE ONE TABLET BY MOUTH ONE TIME DAILY**APPOINTMENT NEEDED FOR MORE REFILLS    atorvastatin (LIPITOR) 40 mg tablet [Pharmacy Med Name: ATORVASTATIN 40 MG TAB[*]] 90 Tablet 0     Sig: TAKE ONE TABLET BY MOUTH ONE TIME DAILY**APPOINTMENT NEEDED FOR MORE REFILLS    losartan (COZAAR) 100 mg tablet [Pharmacy Med Name: LOSARTAN 100 MG TAB[*]] 90 Tablet 0     Sig: TAKE ONE TABLET BY MOUTH ONE TIME DAILY**APPOINTMENT NEEDED FOR MORE REFILLS    hydrALAZINE (APRESOLINE) 25 mg tablet [Pharmacy Med Name: HYDRALAZINE 25 MG TAB[*]] 270 Tablet 0     Sig: TAKE ONE TABLET BY MOUTH THREE TIMES A DAY **APPOINTMENT NEEDED FOR MORE REFILLS    metoprolol tartrate (LOPRESSOR) 50 mg tablet [Pharmacy Med Name: METOPROLOL TART 50 MG TAB[*]] 180 Tablet 0     Sig: TAKE ONE TABLET BY MOUTH EVERY 12 HOURS**APPOINTMENT NEEDED FOR MORE REFILLS     Refill was denied because,  PT NEEDS AN APPOINTMENT drive to in a familiar place.  For the most part patient has good insight into her condition.  She reports that at baseline she has an inpatient personality and can get easily stressed.  Patient also reports frequent nighttime awakenings due to pain from her arthritis.  She endorses that she snores and does not wake up feeling well rested.  Otherwise dementia review of systems is negative.  Patient is independent in all activities of daily living.  [place] today 22/30 with deficits mostly in visuospatial/executive function and delayed recall.     At this time, I believe that patient has a mild cognitive impairment versus pseudodementia secondary to her tendency to easily get stressed and potential underlying obstructive sleep apnea causing unrestful sleep.  I had a long conversation today with her about how when we tried to control everything we can get overwhelmed and this can impact our memory.  Untreated obstructive sleep apnea can also cause problems with memory and restless sleep cannot allow time for consolidation of memory.     Orders placed included an MRI neuro quant without contrast, sleep medicine evaluation, referral to audiology.  I recommended she use hearing amplifiers.  I reviewed B12 levels which were supratherapeutic and advised her to decrease her supplementation to 500 mcg.  Encouraged patient to practice mindfulness and to be aware of potential danger situations.     Prior visits:     4/15/2024: Received urgent TT with abnormal MRI findings. Called patient. She denies any acute sx. Order placed for STAT CTA. Advised patient to call central scheduling and get imaging done no later than tomorrow. Gave number to central scheduling.     4/17/2024: Reported transient episodes of dizziness made worse with aspirin use.     Internal carotid stenosis: I reviewed findings of internal carotid stenosis with patient.Patient reports getting transient episodes of dizziness.  These are made worse with aspirin use.   Recent LDL 89.  Patient not on statin medication.  Echocardiogram ordered.  Patient referred to cardiology.  I placed a stat referral to neurosurgery to evaluate ICA stenosis.     MCI: At this time, I believe that patient has a mild cognitive impairment versus pseudodementia secondary to her tendency to easily get stressed and potential underlying obstructive sleep apnea causing unrestful sleep.        Irregularly irregular heart rhythm: I noted on cardiac auscultation.  Patient reports palpitations.  I ordered an echocardiogram and referred to cardiology for long-term cardiac monitoring     6/18/24: Today she reports that she still not on a statin medication because she gets tingling of bilateral upper extremities.Saw neurosurgery on 6/13/24: Reviewed that it's unclear how long this has been present, suspect this is chronic. There was no suggestion of new stroke on MRI neuroquant. No neurosurgical intervention recommended given long segment stenoses/occlusions extending intracranially.     7/17/2024: Today patient reports that she has been tolerating aspirin without a problem. Patient to follow-up with sleep medicine for evaluation of TRISTIAN as untreated TRISTIAN can also cause problems with memory and restless sleep cannot allow time for consolidation of memory. This was noted on cardiac auscultation.  Patient reports palpitations.  Echocardiogram with a normal left atrium and an ejection fraction of 65%.  Patient saw cardiology on 6/6/2024 and ordered a Zio patch x 7 days. Ziopatch: There were no sustained arrhythmias and no symptoms were reported during the course of monitoring.      Workup:     MRI brain neuro quant without contrast (4/9/2024):No mass effect, acute intracranial hemorrhage or evidence of recent infarction. Moderate chronic microvascular ischemic change. NeuroQuant analysis was performed: Normal study; Does not support neurodegeneration.     Incidental note is made of age-indeterminate loss of the  normal flow-void within the right internal carotid artery which may be on the basis of high-grade stenosis or occlusion. CT angiogram of the head and neck is recommended for further evaluation.        CTA head and neck with and without contrast (4/16/2024): Diffuse, high-grade right cervical and intracranial ICA stenosis with segmental areas of occlusion. Unknown chronicity. This may be due to dissection. No secondary evidence of significant atherosclerosis. Absent right anterior cerebral artery A1 segment which is commonly developmental. Both anterior cerebral arteries are otherwise patent. Middle cerebral artery supplied by the posterior circulation. Patent right ophthalmic artery origin. No acute intracranial pathology.     LDL (2/1/2024): 89     TSH: (2/1/24): 5.237, normal T4     Echocardiogram: Left atrium normal in size.  Ejection fraction of 65%.        Interval history:    Grandchild has been helping her.   Patient has difficulty with short term memory   Denies any new stroke like symptoms   Has been taking baby aspirin   Arthritis has been bothering her, worse with mopping and using the broom. Taking dual tylenol and motrin       Review of Systems   Constitutional:  Negative for appetite change, fatigue and fever.   HENT: Negative.  Negative for hearing loss, tinnitus, trouble swallowing and voice change.    Eyes: Negative.  Negative for photophobia, pain and visual disturbance.   Respiratory: Negative.  Negative for shortness of breath.    Cardiovascular: Negative.  Negative for palpitations.   Gastrointestinal: Negative.  Negative for nausea and vomiting.   Endocrine: Negative.  Negative for cold intolerance.   Genitourinary: Negative.  Negative for dysuria, frequency and urgency.   Musculoskeletal:  Negative for back pain, gait problem, myalgias, neck pain and neck stiffness.   Skin: Negative.  Negative for rash.   Allergic/Immunologic: Negative.    Neurological:  Negative for dizziness, tremors,  "seizures, syncope, facial asymmetry, speech difficulty, weakness, light-headedness, numbness and headaches.   Hematological: Negative.  Does not bruise/bleed easily.   Psychiatric/Behavioral: Negative.  Negative for confusion, hallucinations and sleep disturbance.    All other systems reviewed and are negative.   I have personally reviewed the MA's review of systems and made changes as necessary.         Objective   /70 (BP Location: Left arm, Patient Position: Sitting, Cuff Size: Standard)   Pulse 76   Temp 98.2 °F (36.8 °C) (Temporal)   Ht 4' 5\" (1.346 m)   Wt 64 kg (141 lb)   LMP  (LMP Unknown)   SpO2 97%   BMI 35.29 kg/m²     Physical Exam  Neurological Exam          "

## 2025-07-07 ENCOUNTER — APPOINTMENT (OUTPATIENT)
Facility: HOSPITAL | Age: 50
DRG: 682 | End: 2025-07-07

## 2025-07-07 ENCOUNTER — HOSPITAL ENCOUNTER (INPATIENT)
Facility: HOSPITAL | Age: 50
LOS: 3 days | Discharge: HOME | DRG: 682 | End: 2025-07-10
Attending: STUDENT IN AN ORGANIZED HEALTH CARE EDUCATION/TRAINING PROGRAM | Admitting: FAMILY MEDICINE

## 2025-07-07 DIAGNOSIS — R79.89 ELEVATED TROPONIN: ICD-10-CM

## 2025-07-07 DIAGNOSIS — I50.9 ACUTE ON CHRONIC CONGESTIVE HEART FAILURE, UNSPECIFIED HEART FAILURE TYPE (HCC): Primary | ICD-10-CM

## 2025-07-07 DIAGNOSIS — N17.9 ACUTE RENAL FAILURE, UNSPECIFIED ACUTE RENAL FAILURE TYPE: ICD-10-CM

## 2025-07-07 DIAGNOSIS — I50.9 OTHER CONGESTIVE HEART FAILURE (HCC): ICD-10-CM

## 2025-07-07 DIAGNOSIS — Z91.148 NONCOMPLIANCE WITH MEDICATIONS: ICD-10-CM

## 2025-07-07 DIAGNOSIS — I10 UNCONTROLLED HYPERTENSION: ICD-10-CM

## 2025-07-07 DIAGNOSIS — E11.22 TYPE 2 DIABETES MELLITUS WITH STAGE 5 CHRONIC KIDNEY DISEASE NOT ON CHRONIC DIALYSIS, WITHOUT LONG-TERM CURRENT USE OF INSULIN (HCC): ICD-10-CM

## 2025-07-07 DIAGNOSIS — G47.33 OSA (OBSTRUCTIVE SLEEP APNEA): ICD-10-CM

## 2025-07-07 DIAGNOSIS — N18.5 TYPE 2 DIABETES MELLITUS WITH STAGE 5 CHRONIC KIDNEY DISEASE NOT ON CHRONIC DIALYSIS, WITHOUT LONG-TERM CURRENT USE OF INSULIN (HCC): ICD-10-CM

## 2025-07-07 LAB
ALBUMIN SERPL-MCNC: 3.1 G/DL (ref 3.5–5)
ALBUMIN/GLOB SERPL: 0.7 (ref 1.1–2.2)
ALP SERPL-CCNC: 91 U/L (ref 45–117)
ALT SERPL-CCNC: 29 U/L (ref 12–78)
ANION GAP SERPL CALC-SCNC: 8 MMOL/L (ref 2–12)
AST SERPL-CCNC: 19 U/L (ref 15–37)
BASOPHILS # BLD: 0.03 K/UL (ref 0–0.1)
BASOPHILS NFR BLD: 0.3 % (ref 0–1)
BILIRUB SERPL-MCNC: 0.5 MG/DL (ref 0.2–1)
BUN SERPL-MCNC: 74 MG/DL (ref 6–20)
BUN/CREAT SERPL: 10 (ref 12–20)
CALCIUM SERPL-MCNC: 8.4 MG/DL (ref 8.5–10.1)
CHLORIDE SERPL-SCNC: 107 MMOL/L (ref 97–108)
CO2 SERPL-SCNC: 23 MMOL/L (ref 21–32)
CREAT SERPL-MCNC: 7.12 MG/DL (ref 0.7–1.3)
DIFFERENTIAL METHOD BLD: ABNORMAL
EOSINOPHIL # BLD: 0.28 K/UL (ref 0–0.4)
EOSINOPHIL NFR BLD: 2.7 % (ref 0–7)
ERYTHROCYTE [DISTWIDTH] IN BLOOD BY AUTOMATED COUNT: 13.6 % (ref 11.5–14.5)
GLOBULIN SER CALC-MCNC: 4.3 G/DL (ref 2–4)
GLUCOSE SERPL-MCNC: 130 MG/DL (ref 65–100)
HCT VFR BLD AUTO: 30.1 % (ref 36.6–50.3)
HGB BLD-MCNC: 9.9 G/DL (ref 12.1–17)
IMM GRANULOCYTES # BLD AUTO: 0.04 K/UL (ref 0–0.04)
IMM GRANULOCYTES NFR BLD AUTO: 0.4 % (ref 0–0.5)
LYMPHOCYTES # BLD: 1.03 K/UL (ref 0.8–3.5)
LYMPHOCYTES NFR BLD: 10.1 % (ref 12–49)
MCH RBC QN AUTO: 27.7 PG (ref 26–34)
MCHC RBC AUTO-ENTMCNC: 32.9 G/DL (ref 30–36.5)
MCV RBC AUTO: 84.1 FL (ref 80–99)
MONOCYTES # BLD: 0.69 K/UL (ref 0–1)
MONOCYTES NFR BLD: 6.8 % (ref 5–13)
NEUTS SEG # BLD: 8.15 K/UL (ref 1.8–8)
NEUTS SEG NFR BLD: 79.7 % (ref 32–75)
NRBC # BLD: 0 K/UL (ref 0–0.01)
NRBC BLD-RTO: 0 PER 100 WBC
NT PRO BNP: ABNORMAL PG/ML
PLATELET # BLD AUTO: 231 K/UL (ref 150–400)
PMV BLD AUTO: 10.8 FL (ref 8.9–12.9)
POTASSIUM SERPL-SCNC: 4.5 MMOL/L (ref 3.5–5.1)
PROT SERPL-MCNC: 7.4 G/DL (ref 6.4–8.2)
RBC # BLD AUTO: 3.58 M/UL (ref 4.1–5.7)
SODIUM SERPL-SCNC: 138 MMOL/L (ref 136–145)
TROPONIN I SERPL HS-MCNC: 166 NG/L (ref 0–76)
WBC # BLD AUTO: 10.2 K/UL (ref 4.1–11.1)

## 2025-07-07 PROCEDURE — 83550 IRON BINDING TEST: CPT

## 2025-07-07 PROCEDURE — 36415 COLL VENOUS BLD VENIPUNCTURE: CPT

## 2025-07-07 PROCEDURE — 83010 ASSAY OF HAPTOGLOBIN QUANT: CPT

## 2025-07-07 PROCEDURE — 82728 ASSAY OF FERRITIN: CPT

## 2025-07-07 PROCEDURE — 82746 ASSAY OF FOLIC ACID SERUM: CPT

## 2025-07-07 PROCEDURE — 71046 X-RAY EXAM CHEST 2 VIEWS: CPT

## 2025-07-07 PROCEDURE — 93005 ELECTROCARDIOGRAM TRACING: CPT | Performed by: STUDENT IN AN ORGANIZED HEALTH CARE EDUCATION/TRAINING PROGRAM

## 2025-07-07 PROCEDURE — 6360000002 HC RX W HCPCS: Performed by: STUDENT IN AN ORGANIZED HEALTH CARE EDUCATION/TRAINING PROGRAM

## 2025-07-07 PROCEDURE — 84484 ASSAY OF TROPONIN QUANT: CPT

## 2025-07-07 PROCEDURE — 83540 ASSAY OF IRON: CPT

## 2025-07-07 PROCEDURE — 6360000002 HC RX W HCPCS

## 2025-07-07 PROCEDURE — 99285 EMERGENCY DEPT VISIT HI MDM: CPT

## 2025-07-07 PROCEDURE — 80053 COMPREHEN METABOLIC PANEL: CPT

## 2025-07-07 PROCEDURE — 83036 HEMOGLOBIN GLYCOSYLATED A1C: CPT

## 2025-07-07 PROCEDURE — 96374 THER/PROPH/DIAG INJ IV PUSH: CPT

## 2025-07-07 PROCEDURE — 83880 ASSAY OF NATRIURETIC PEPTIDE: CPT

## 2025-07-07 PROCEDURE — 85045 AUTOMATED RETICULOCYTE COUNT: CPT

## 2025-07-07 PROCEDURE — 1100000000 HC RM PRIVATE

## 2025-07-07 PROCEDURE — 85025 COMPLETE CBC W/AUTO DIFF WBC: CPT

## 2025-07-07 PROCEDURE — 82607 VITAMIN B-12: CPT

## 2025-07-07 RX ORDER — SODIUM CHLORIDE 9 MG/ML
INJECTION, SOLUTION INTRAVENOUS PRN
Status: DISCONTINUED | OUTPATIENT
Start: 2025-07-07 | End: 2025-07-10 | Stop reason: HOSPADM

## 2025-07-07 RX ORDER — ACETAMINOPHEN 650 MG/1
650 SUPPOSITORY RECTAL EVERY 6 HOURS PRN
Status: DISCONTINUED | OUTPATIENT
Start: 2025-07-07 | End: 2025-07-10 | Stop reason: HOSPADM

## 2025-07-07 RX ORDER — SODIUM CHLORIDE 0.9 % (FLUSH) 0.9 %
5-40 SYRINGE (ML) INJECTION EVERY 12 HOURS SCHEDULED
Status: DISCONTINUED | OUTPATIENT
Start: 2025-07-07 | End: 2025-07-10 | Stop reason: HOSPADM

## 2025-07-07 RX ORDER — ONDANSETRON 2 MG/ML
4 INJECTION INTRAMUSCULAR; INTRAVENOUS EVERY 6 HOURS PRN
Status: DISCONTINUED | OUTPATIENT
Start: 2025-07-07 | End: 2025-07-10 | Stop reason: HOSPADM

## 2025-07-07 RX ORDER — ATORVASTATIN CALCIUM 20 MG/1
40 TABLET, FILM COATED ORAL DAILY
Status: DISCONTINUED | OUTPATIENT
Start: 2025-07-08 | End: 2025-07-08

## 2025-07-07 RX ORDER — POLYETHYLENE GLYCOL 3350 17 G/17G
17 POWDER, FOR SOLUTION ORAL DAILY PRN
Status: DISCONTINUED | OUTPATIENT
Start: 2025-07-07 | End: 2025-07-10 | Stop reason: HOSPADM

## 2025-07-07 RX ORDER — SODIUM CHLORIDE 0.9 % (FLUSH) 0.9 %
5-40 SYRINGE (ML) INJECTION PRN
Status: DISCONTINUED | OUTPATIENT
Start: 2025-07-07 | End: 2025-07-10 | Stop reason: HOSPADM

## 2025-07-07 RX ORDER — HYDRALAZINE HYDROCHLORIDE 20 MG/ML
10 INJECTION INTRAMUSCULAR; INTRAVENOUS
Status: COMPLETED | OUTPATIENT
Start: 2025-07-07 | End: 2025-07-07

## 2025-07-07 RX ORDER — ONDANSETRON 4 MG/1
4 TABLET, ORALLY DISINTEGRATING ORAL EVERY 8 HOURS PRN
Status: DISCONTINUED | OUTPATIENT
Start: 2025-07-07 | End: 2025-07-10 | Stop reason: HOSPADM

## 2025-07-07 RX ORDER — ACETAMINOPHEN 325 MG/1
650 TABLET ORAL EVERY 6 HOURS PRN
Status: DISCONTINUED | OUTPATIENT
Start: 2025-07-07 | End: 2025-07-10 | Stop reason: HOSPADM

## 2025-07-07 RX ORDER — LABETALOL HYDROCHLORIDE 5 MG/ML
10 INJECTION, SOLUTION INTRAVENOUS ONCE
Status: COMPLETED | OUTPATIENT
Start: 2025-07-07 | End: 2025-07-07

## 2025-07-07 RX ORDER — FUROSEMIDE 10 MG/ML
40 INJECTION INTRAMUSCULAR; INTRAVENOUS 2 TIMES DAILY
Status: DISCONTINUED | OUTPATIENT
Start: 2025-07-07 | End: 2025-07-08

## 2025-07-07 RX ADMIN — LABETALOL HYDROCHLORIDE 10 MG: 5 INJECTION, SOLUTION INTRAVENOUS at 23:27

## 2025-07-07 RX ADMIN — FUROSEMIDE 40 MG: 10 INJECTION, SOLUTION INTRAMUSCULAR; INTRAVENOUS at 23:26

## 2025-07-07 RX ADMIN — HYDRALAZINE HYDROCHLORIDE 10 MG: 20 INJECTION INTRAMUSCULAR; INTRAVENOUS at 21:56

## 2025-07-07 ASSESSMENT — PAIN SCALES - GENERAL: PAINLEVEL_OUTOF10: 0

## 2025-07-07 ASSESSMENT — PAIN - FUNCTIONAL ASSESSMENT: PAIN_FUNCTIONAL_ASSESSMENT: 0-10

## 2025-07-08 ENCOUNTER — APPOINTMENT (OUTPATIENT)
Facility: HOSPITAL | Age: 50
DRG: 682 | End: 2025-07-08

## 2025-07-08 ENCOUNTER — APPOINTMENT (OUTPATIENT)
Facility: HOSPITAL | Age: 50
DRG: 682 | End: 2025-07-08
Payer: COMMERCIAL

## 2025-07-08 PROBLEM — I16.1 HYPERTENSIVE EMERGENCY: Status: ACTIVE | Noted: 2025-07-08

## 2025-07-08 PROBLEM — D64.9 NORMOCYTIC ANEMIA: Status: ACTIVE | Noted: 2025-07-08

## 2025-07-08 PROBLEM — E11.22 TYPE 2 DIABETES MELLITUS WITH CHRONIC KIDNEY DISEASE, WITHOUT LONG-TERM CURRENT USE OF INSULIN (HCC): Status: ACTIVE | Noted: 2023-02-19

## 2025-07-08 LAB
AMPHET UR QL SCN: NEGATIVE
ANION GAP SERPL CALC-SCNC: 8 MMOL/L (ref 2–12)
ANION GAP SERPL CALC-SCNC: 8 MMOL/L (ref 2–12)
BARBITURATES UR QL SCN: NEGATIVE
BASOPHILS # BLD: 0.04 K/UL (ref 0–0.1)
BASOPHILS NFR BLD: 0.4 % (ref 0–1)
BENZODIAZ UR QL: NEGATIVE
BUN SERPL-MCNC: 69 MG/DL (ref 6–20)
BUN SERPL-MCNC: 77 MG/DL (ref 6–20)
BUN/CREAT SERPL: 11 (ref 12–20)
BUN/CREAT SERPL: 11 (ref 12–20)
CALCIUM SERPL-MCNC: 7.9 MG/DL (ref 8.5–10.1)
CALCIUM SERPL-MCNC: 8.8 MG/DL (ref 8.5–10.1)
CANNABINOIDS UR QL SCN: NEGATIVE
CHLORIDE SERPL-SCNC: 107 MMOL/L (ref 97–108)
CHLORIDE SERPL-SCNC: 110 MMOL/L (ref 97–108)
CHLORIDE UR-SCNC: 90 MMOL/L
CO2 SERPL-SCNC: 21 MMOL/L (ref 21–32)
CO2 SERPL-SCNC: 23 MMOL/L (ref 21–32)
COCAINE UR QL SCN: NEGATIVE
CREAT SERPL-MCNC: 6.46 MG/DL (ref 0.7–1.3)
CREAT SERPL-MCNC: 7.02 MG/DL (ref 0.7–1.3)
CREAT UR-MCNC: 34 MG/DL
CREAT UR-MCNC: 35.9 MG/DL
DIFFERENTIAL METHOD BLD: ABNORMAL
ECHO AO ARCH DIAM: 3 CM
ECHO AO ASC DIAM: 3.2 CM
ECHO AO ASCENDING AORTA INDEX: 1.39 CM/M2
ECHO AR MAX VEL PISA: 4.2 M/S
ECHO AV AREA PEAK VELOCITY: 2 CM2
ECHO AV AREA VTI: 2.1 CM2
ECHO AV AREA/BSA PEAK VELOCITY: 0.9 CM2/M2
ECHO AV AREA/BSA VTI: 0.9 CM2/M2
ECHO AV MEAN GRADIENT: 9 MMHG
ECHO AV MEAN VELOCITY: 1.4 M/S
ECHO AV PEAK GRADIENT: 17 MMHG
ECHO AV PEAK VELOCITY: 2 M/S
ECHO AV REGURGITANT PHT: 352.5 MS
ECHO AV VELOCITY RATIO: 0.65
ECHO AV VTI: 34.2 CM
ECHO BSA: 2.41 M2
ECHO EST RA PRESSURE: 15 MMHG
ECHO LA DIAMETER INDEX: 1.65 CM/M2
ECHO LA DIAMETER: 3.8 CM
ECHO LA VOL A-L A2C: 115 ML (ref 18–58)
ECHO LA VOL A-L A4C: 115 ML (ref 18–58)
ECHO LA VOL BP: 110 ML (ref 18–58)
ECHO LA VOL MOD A2C: 108 ML (ref 18–58)
ECHO LA VOL MOD A4C: 107 ML (ref 18–58)
ECHO LA VOL/BSA BIPLANE: 48 ML/M2 (ref 16–34)
ECHO LA VOLUME AREA LENGTH: 118 ML
ECHO LA VOLUME INDEX A-L A2C: 50 ML/M2 (ref 16–34)
ECHO LA VOLUME INDEX A-L A4C: 50 ML/M2 (ref 16–34)
ECHO LA VOLUME INDEX AREA LENGTH: 51 ML/M2 (ref 16–34)
ECHO LA VOLUME INDEX MOD A2C: 47 ML/M2 (ref 16–34)
ECHO LA VOLUME INDEX MOD A4C: 46 ML/M2 (ref 16–34)
ECHO LV E' LATERAL VELOCITY: 6.69 CM/S
ECHO LV E' SEPTAL VELOCITY: 6.33 CM/S
ECHO LV EDV A2C: 117 ML
ECHO LV EDV A4C: 120 ML
ECHO LV EDV BP: 120 ML (ref 67–155)
ECHO LV EDV INDEX A4C: 52 ML/M2
ECHO LV EDV INDEX BP: 52 ML/M2
ECHO LV EDV NDEX A2C: 51 ML/M2
ECHO LV EF PHYSICIAN: 60 %
ECHO LV EJECTION FRACTION A2C: 50 %
ECHO LV EJECTION FRACTION A4C: 53 %
ECHO LV ESV A2C: 58 ML
ECHO LV ESV A4C: 57 ML
ECHO LV ESV BP: 59 ML (ref 22–58)
ECHO LV ESV INDEX A2C: 25 ML/M2
ECHO LV ESV INDEX A4C: 25 ML/M2
ECHO LV ESV INDEX BP: 26 ML/M2
ECHO LV FRACTIONAL SHORTENING: 25 % (ref 28–44)
ECHO LV INTERNAL DIMENSION DIASTOLE INDEX: 2.29 CM/M2
ECHO LV INTERNAL DIMENSION DIASTOLIC: 5.3 CM (ref 4.2–5.9)
ECHO LV INTERNAL DIMENSION SYSTOLIC INDEX: 1.73 CM/M2
ECHO LV INTERNAL DIMENSION SYSTOLIC: 4 CM
ECHO LV IVSD: 1.1 CM (ref 0.6–1)
ECHO LV MASS 2D: 242 G (ref 88–224)
ECHO LV MASS INDEX 2D: 104.7 G/M2 (ref 49–115)
ECHO LV POSTERIOR WALL DIASTOLIC: 1.2 CM (ref 0.6–1)
ECHO LV RELATIVE WALL THICKNESS RATIO: 0.45
ECHO LVOT AREA: 3.1 CM2
ECHO LVOT AV VTI INDEX: 0.66
ECHO LVOT DIAM: 2 CM
ECHO LVOT MEAN GRADIENT: 4 MMHG
ECHO LVOT PEAK GRADIENT: 6 MMHG
ECHO LVOT PEAK VELOCITY: 1.3 M/S
ECHO LVOT STROKE VOLUME INDEX: 30.6 ML/M2
ECHO LVOT SV: 70.7 ML
ECHO LVOT VTI: 22.5 CM
ECHO MV A VELOCITY: 0.85 M/S
ECHO MV E DECELERATION TIME (DT): 125 MS
ECHO MV E VELOCITY: 1.12 M/S
ECHO MV E/A RATIO: 1.32
ECHO MV E/E' LATERAL: 16.74
ECHO MV E/E' RATIO (AVERAGED): 17.22
ECHO MV E/E' SEPTAL: 17.69
ECHO MV REGURGITANT PEAK GRADIENT: 149 MMHG
ECHO MV REGURGITANT PEAK VELOCITY: 6.1 M/S
ECHO PV MAX VELOCITY: 1.3 M/S
ECHO PV PEAK GRADIENT: 7 MMHG
ECHO RIGHT VENTRICULAR SYSTOLIC PRESSURE (RVSP): 44 MMHG
ECHO RV FREE WALL PEAK S': 8.5 CM/S
ECHO RV INTERNAL DIMENSION: 3.8 CM
ECHO RV TAPSE: 2.6 CM (ref 1.7–?)
ECHO RVOT MEAN GRADIENT: 3 MMHG
ECHO RVOT PEAK GRADIENT: 5 MMHG
ECHO RVOT PEAK VELOCITY: 1.1 M/S
ECHO RVOT VTI: 19.5 CM
ECHO TV REGURGITANT MAX VELOCITY: 2.69 M/S
ECHO TV REGURGITANT PEAK GRADIENT: 29 MMHG
EOSINOPHIL # BLD: 0.37 K/UL (ref 0–0.4)
EOSINOPHIL NFR BLD: 3.5 % (ref 0–7)
ERYTHROCYTE [DISTWIDTH] IN BLOOD BY AUTOMATED COUNT: 13.7 % (ref 11.5–14.5)
EST. AVERAGE GLUCOSE BLD GHB EST-MCNC: 128 MG/DL
FERRITIN SERPL-MCNC: 195 NG/ML (ref 26–388)
FOLATE SERPL-MCNC: 9 NG/ML (ref 5–21)
GLUCOSE BLD STRIP.AUTO-MCNC: 126 MG/DL (ref 65–117)
GLUCOSE BLD STRIP.AUTO-MCNC: 128 MG/DL (ref 65–117)
GLUCOSE BLD STRIP.AUTO-MCNC: 142 MG/DL (ref 65–117)
GLUCOSE BLD STRIP.AUTO-MCNC: 193 MG/DL (ref 65–117)
GLUCOSE BLD STRIP.AUTO-MCNC: 210 MG/DL (ref 65–117)
GLUCOSE SERPL-MCNC: 112 MG/DL (ref 65–100)
GLUCOSE SERPL-MCNC: 118 MG/DL (ref 65–100)
HAPTOGLOB SERPL-MCNC: 207 MG/DL (ref 30–200)
HBA1C MFR BLD: 6.1 % (ref 4–5.6)
HCT VFR BLD AUTO: 29.1 % (ref 36.6–50.3)
HCT VFR BLD AUTO: 31.2 % (ref 36.6–50.3)
HGB BLD-MCNC: 10.1 G/DL (ref 12.1–17)
HGB BLD-MCNC: 9.7 G/DL (ref 12.1–17)
IMM GRANULOCYTES # BLD AUTO: 0.07 K/UL (ref 0–0.04)
IMM GRANULOCYTES NFR BLD AUTO: 0.7 % (ref 0–0.5)
IRON SATN MFR SERPL: 15 % (ref 20–50)
IRON SERPL-MCNC: 31 UG/DL (ref 35–150)
LYMPHOCYTES # BLD: 1.02 K/UL (ref 0.8–3.5)
LYMPHOCYTES NFR BLD: 9.6 % (ref 12–49)
Lab: NORMAL
MCH RBC QN AUTO: 28 PG (ref 26–34)
MCHC RBC AUTO-ENTMCNC: 33.3 G/DL (ref 30–36.5)
MCV RBC AUTO: 83.9 FL (ref 80–99)
METHADONE UR QL: NEGATIVE
MICROALBUMIN UR-MCNC: 186 MG/DL
MICROALBUMIN/CREAT UR-RTO: 5181 MG/G (ref 0–30)
MONOCYTES # BLD: 0.68 K/UL (ref 0–1)
MONOCYTES NFR BLD: 6.4 % (ref 5–13)
NEUTS SEG # BLD: 8.46 K/UL (ref 1.8–8)
NEUTS SEG NFR BLD: 79.4 % (ref 32–75)
NRBC # BLD: 0 K/UL (ref 0–0.01)
NRBC BLD-RTO: 0 PER 100 WBC
OPIATES UR QL: NEGATIVE
PCP UR QL: NEGATIVE
PERIPHERAL SMEAR, MD REVIEW: NORMAL
PHOSPHATE SERPL-MCNC: 4.8 MG/DL (ref 2.6–4.7)
PLATELET # BLD AUTO: 279 K/UL (ref 150–400)
PMV BLD AUTO: 11 FL (ref 8.9–12.9)
POTASSIUM SERPL-SCNC: 4.2 MMOL/L (ref 3.5–5.1)
POTASSIUM SERPL-SCNC: 4.2 MMOL/L (ref 3.5–5.1)
PROT UR-MCNC: 240 MG/DL (ref 0–11.9)
PROT/CREAT UR-RTO: 7.1
RBC # BLD AUTO: 3.47 M/UL (ref 4.1–5.7)
RETICS # AUTO: 0.06 M/UL (ref 0.03–0.1)
RETICS/RBC NFR AUTO: 1.7 % (ref 0.7–2.1)
SERVICE CMNT-IMP: ABNORMAL
SODIUM SERPL-SCNC: 138 MMOL/L (ref 136–145)
SODIUM SERPL-SCNC: 139 MMOL/L (ref 136–145)
SODIUM UR-SCNC: 84 MMOL/L
TIBC SERPL-MCNC: 209 UG/DL (ref 250–450)
TROPONIN I SERPL HS-MCNC: 170 NG/L (ref 0–76)
VIT B12 SERPL-MCNC: 572 PG/ML (ref 193–986)
WBC # BLD AUTO: 10.6 K/UL (ref 4.1–11.1)

## 2025-07-08 PROCEDURE — 2000000000 HC ICU R&B

## 2025-07-08 PROCEDURE — 82962 GLUCOSE BLOOD TEST: CPT

## 2025-07-08 PROCEDURE — 6360000002 HC RX W HCPCS

## 2025-07-08 PROCEDURE — 36415 COLL VENOUS BLD VENIPUNCTURE: CPT

## 2025-07-08 PROCEDURE — 84484 ASSAY OF TROPONIN QUANT: CPT

## 2025-07-08 PROCEDURE — 85025 COMPLETE CBC W/AUTO DIFF WBC: CPT

## 2025-07-08 PROCEDURE — 6360000002 HC RX W HCPCS: Performed by: INTERNAL MEDICINE

## 2025-07-08 PROCEDURE — 93306 TTE W/DOPPLER COMPLETE: CPT

## 2025-07-08 PROCEDURE — 6370000000 HC RX 637 (ALT 250 FOR IP): Performed by: INTERNAL MEDICINE

## 2025-07-08 PROCEDURE — 84300 ASSAY OF URINE SODIUM: CPT

## 2025-07-08 PROCEDURE — 82570 ASSAY OF URINE CREATININE: CPT

## 2025-07-08 PROCEDURE — 93306 TTE W/DOPPLER COMPLETE: CPT | Performed by: SPECIALIST

## 2025-07-08 PROCEDURE — 99223 1ST HOSP IP/OBS HIGH 75: CPT | Performed by: SPECIALIST

## 2025-07-08 PROCEDURE — 2500000003 HC RX 250 WO HCPCS

## 2025-07-08 PROCEDURE — 82043 UR ALBUMIN QUANTITATIVE: CPT

## 2025-07-08 PROCEDURE — 99222 1ST HOSP IP/OBS MODERATE 55: CPT | Performed by: STUDENT IN AN ORGANIZED HEALTH CARE EDUCATION/TRAINING PROGRAM

## 2025-07-08 PROCEDURE — 76770 US EXAM ABDO BACK WALL COMP: CPT

## 2025-07-08 PROCEDURE — 6370000000 HC RX 637 (ALT 250 FOR IP)

## 2025-07-08 PROCEDURE — 94761 N-INVAS EAR/PLS OXIMETRY MLT: CPT

## 2025-07-08 PROCEDURE — 84100 ASSAY OF PHOSPHORUS: CPT

## 2025-07-08 PROCEDURE — 85018 HEMOGLOBIN: CPT

## 2025-07-08 PROCEDURE — 80048 BASIC METABOLIC PNL TOTAL CA: CPT

## 2025-07-08 PROCEDURE — 80307 DRUG TEST PRSMV CHEM ANLYZR: CPT

## 2025-07-08 PROCEDURE — 84156 ASSAY OF PROTEIN URINE: CPT

## 2025-07-08 PROCEDURE — 82436 ASSAY OF URINE CHLORIDE: CPT

## 2025-07-08 PROCEDURE — 6370000000 HC RX 637 (ALT 250 FOR IP): Performed by: SPECIALIST

## 2025-07-08 PROCEDURE — 85014 HEMATOCRIT: CPT

## 2025-07-08 RX ORDER — HYDRALAZINE HYDROCHLORIDE 25 MG/1
50 TABLET, FILM COATED ORAL EVERY 8 HOURS SCHEDULED
Status: DISCONTINUED | OUTPATIENT
Start: 2025-07-08 | End: 2025-07-09

## 2025-07-08 RX ORDER — LABETALOL HYDROCHLORIDE 5 MG/ML
20 INJECTION, SOLUTION INTRAVENOUS ONCE
Status: COMPLETED | OUTPATIENT
Start: 2025-07-08 | End: 2025-07-08

## 2025-07-08 RX ORDER — HYDRALAZINE HYDROCHLORIDE 25 MG/1
100 TABLET, FILM COATED ORAL 3 TIMES DAILY
Status: DISCONTINUED | OUTPATIENT
Start: 2025-07-08 | End: 2025-07-09 | Stop reason: SDUPTHER

## 2025-07-08 RX ORDER — HYDRALAZINE HYDROCHLORIDE 25 MG/1
50 TABLET, FILM COATED ORAL EVERY 8 HOURS SCHEDULED
Status: DISCONTINUED | OUTPATIENT
Start: 2025-07-08 | End: 2025-07-08

## 2025-07-08 RX ORDER — HYDRALAZINE HYDROCHLORIDE 25 MG/1
25 TABLET, FILM COATED ORAL EVERY 8 HOURS SCHEDULED
Status: DISCONTINUED | OUTPATIENT
Start: 2025-07-08 | End: 2025-07-08

## 2025-07-08 RX ORDER — DEXTROSE MONOHYDRATE 100 MG/ML
INJECTION, SOLUTION INTRAVENOUS CONTINUOUS PRN
Status: DISCONTINUED | OUTPATIENT
Start: 2025-07-08 | End: 2025-07-10 | Stop reason: HOSPADM

## 2025-07-08 RX ORDER — AMLODIPINE BESYLATE 5 MG/1
10 TABLET ORAL DAILY
Status: DISCONTINUED | OUTPATIENT
Start: 2025-07-08 | End: 2025-07-10 | Stop reason: HOSPADM

## 2025-07-08 RX ORDER — LABETALOL HYDROCHLORIDE 5 MG/ML
10 INJECTION, SOLUTION INTRAVENOUS ONCE
Status: COMPLETED | OUTPATIENT
Start: 2025-07-08 | End: 2025-07-08

## 2025-07-08 RX ORDER — FUROSEMIDE 10 MG/ML
80 INJECTION INTRAMUSCULAR; INTRAVENOUS 2 TIMES DAILY
Status: DISCONTINUED | OUTPATIENT
Start: 2025-07-08 | End: 2025-07-10 | Stop reason: HOSPADM

## 2025-07-08 RX ORDER — HYDRALAZINE HYDROCHLORIDE 20 MG/ML
20 INJECTION INTRAMUSCULAR; INTRAVENOUS EVERY 6 HOURS PRN
Status: DISCONTINUED | OUTPATIENT
Start: 2025-07-08 | End: 2025-07-08

## 2025-07-08 RX ORDER — ROSUVASTATIN CALCIUM 10 MG/1
10 TABLET, COATED ORAL NIGHTLY
Status: DISCONTINUED | OUTPATIENT
Start: 2025-07-08 | End: 2025-07-10 | Stop reason: HOSPADM

## 2025-07-08 RX ORDER — METOPROLOL TARTRATE 50 MG
50 TABLET ORAL EVERY 12 HOURS
Status: DISCONTINUED | OUTPATIENT
Start: 2025-07-08 | End: 2025-07-08

## 2025-07-08 RX ORDER — INSULIN LISPRO 100 [IU]/ML
0-4 INJECTION, SOLUTION INTRAVENOUS; SUBCUTANEOUS
Status: DISCONTINUED | OUTPATIENT
Start: 2025-07-08 | End: 2025-07-10 | Stop reason: HOSPADM

## 2025-07-08 RX ADMIN — ROSUVASTATIN CALCIUM 10 MG: 10 TABLET, FILM COATED ORAL at 19:55

## 2025-07-08 RX ADMIN — SODIUM CHLORIDE, PRESERVATIVE FREE 10 ML: 5 INJECTION INTRAVENOUS at 07:37

## 2025-07-08 RX ADMIN — AMLODIPINE BESYLATE 10 MG: 5 TABLET ORAL at 10:57

## 2025-07-08 RX ADMIN — FUROSEMIDE 80 MG: 10 INJECTION, SOLUTION INTRAMUSCULAR; INTRAVENOUS at 17:52

## 2025-07-08 RX ADMIN — FUROSEMIDE 40 MG: 10 INJECTION, SOLUTION INTRAMUSCULAR; INTRAVENOUS at 07:37

## 2025-07-08 RX ADMIN — SODIUM CHLORIDE, PRESERVATIVE FREE 10 ML: 5 INJECTION INTRAVENOUS at 06:20

## 2025-07-08 RX ADMIN — HYDRALAZINE HYDROCHLORIDE 20 MG: 20 INJECTION INTRAMUSCULAR; INTRAVENOUS at 12:03

## 2025-07-08 RX ADMIN — HYDRALAZINE HYDROCHLORIDE 20 MG: 20 INJECTION INTRAMUSCULAR; INTRAVENOUS at 02:55

## 2025-07-08 RX ADMIN — HYDRALAZINE HYDROCHLORIDE 50 MG: 25 TABLET ORAL at 20:01

## 2025-07-08 RX ADMIN — HYDRALAZINE HYDROCHLORIDE 25 MG: 25 TABLET ORAL at 06:23

## 2025-07-08 RX ADMIN — HYDRALAZINE HYDROCHLORIDE 50 MG: 25 TABLET ORAL at 15:37

## 2025-07-08 RX ADMIN — LABETALOL HYDROCHLORIDE 20 MG: 5 INJECTION, SOLUTION INTRAVENOUS at 22:26

## 2025-07-08 RX ADMIN — LABETALOL HYDROCHLORIDE 10 MG: 5 INJECTION, SOLUTION INTRAVENOUS at 07:37

## 2025-07-08 RX ADMIN — INSULIN LISPRO 1 UNITS: 100 INJECTION, SOLUTION INTRAVENOUS; SUBCUTANEOUS at 18:00

## 2025-07-08 RX ADMIN — SODIUM CHLORIDE, PRESERVATIVE FREE 10 ML: 5 INJECTION INTRAVENOUS at 19:57

## 2025-07-08 RX ADMIN — LABETALOL HYDROCHLORIDE 20 MG: 5 INJECTION, SOLUTION INTRAVENOUS at 01:07

## 2025-07-08 ASSESSMENT — ENCOUNTER SYMPTOMS
VOMITING: 0
BLOOD IN STOOL: 0
SHORTNESS OF BREATH: 1
COUGH: 0
NAUSEA: 0
ABDOMINAL PAIN: 0

## 2025-07-08 NOTE — PLAN OF CARE
Problem: Chronic Conditions and Co-morbidities  Goal: Patient's chronic conditions and co-morbidity symptoms are monitored and maintained or improved  7/8/2025 1154 by Maris Alberto RN  Outcome: Progressing  Flowsheets (Taken 7/8/2025 0730)  Care Plan - Patient's Chronic Conditions and Co-Morbidity Symptoms are Monitored and Maintained or Improved:   Monitor and assess patient's chronic conditions and comorbid symptoms for stability, deterioration, or improvement   Collaborate with multidisciplinary team to address chronic and comorbid conditions and prevent exacerbation or deterioration   Update acute care plan with appropriate goals if chronic or comorbid symptoms are exacerbated and prevent overall improvement and discharge  7/8/2025 0655 by Brooklyn Hong RN  Outcome: Progressing  Flowsheets (Taken 7/8/2025 0530)  Care Plan - Patient's Chronic Conditions and Co-Morbidity Symptoms are Monitored and Maintained or Improved: Monitor and assess patient's chronic conditions and comorbid symptoms for stability, deterioration, or improvement     Problem: Discharge Planning  Goal: Discharge to home or other facility with appropriate resources  Outcome: Progressing  Flowsheets  Taken 7/8/2025 0730 by Maris Alberto RN  Discharge to home or other facility with appropriate resources:   Identify barriers to discharge with patient and caregiver   Arrange for needed discharge resources and transportation as appropriate   Identify discharge learning needs (meds, wound care, etc)   Arrange for interpreters to assist at discharge as needed   Refer to discharge planning if patient needs post-hospital services based on physician order or complex needs related to functional status, cognitive ability or social support system  Taken 7/8/2025 0530 by Brooklyn Hong RN  Discharge to home or other facility with appropriate resources: Identify barriers to discharge with patient and caregiver     Problem: Pain  Goal:

## 2025-07-08 NOTE — H&P
Take 1 tablet by mouth daily Appointment needed with general Cardiology for further refills. 10/10/23   Clint King MD   hydrALAZINE (APRESOLINE) 100 MG tablet Take 100 mg by mouth 3 times daily 10/13/22   Automatic Reconciliation, Ar   hydrALAZINE (APRESOLINE) 50 MG tablet ceived the following from [facility]: Outside name: hydrALAZINE (APRESOLINE) 50 mg tablet 2/1/23   Automatic Reconciliation, Ar   metFORMIN (GLUCOPHAGE) 500 MG tablet Take 500 mg by mouth 2 times daily (with meals) 2/17/23 5/18/23  Automatic Reconciliation, Ar   metoprolol tartrate (LOPRESSOR) 50 MG tablet TAKE ONE TABLET BY MOUTH EVERY 12 HOURS 6/1/22   Automatic Reconciliation, Ar       Allergies  Allergies   Allergen Reactions    Diphenhydramine Hives       Past Medical History      Previous Hospitalization(s)  Past Surgical History:   Procedure Laterality Date    OTHER SURGICAL HISTORY      testicular surgery in childhood       Social History  Social History     Socioeconomic History    Marital status:      Spouse name: Not on file    Number of children: Not on file    Years of education: Not on file    Highest education level: Not on file   Occupational History    Not on file   Tobacco Use    Smoking status: Never    Smokeless tobacco: Never   Substance and Sexual Activity    Alcohol use: Never    Drug use: Never    Sexual activity: Not on file   Other Topics Concern    Not on file   Social History Narrative    Not on file     Social Drivers of Health     Financial Resource Strain: Not on file   Food Insecurity: Not on file   Transportation Needs: Not on file   Physical Activity: Not on file   Stress: Not on file   Social Connections: Not on file   Intimate Partner Violence: Not on file   Housing Stability: Not on file         Patient resides    Independently    x  With family care      Assisted living      SNF      Ambulates  x  Independently      With cane       Assisted walker      Alcohol history   x  None

## 2025-07-08 NOTE — CARE COORDINATION
Care Management Initial Assessment  7/8/2025 3:12 PM  If patient is discharged prior to next notation, then this note serves as note for discharge by case management.    Reason for Admission:   Elevated troponin [R79.89]  Acute renal failure (ARF) [N17.9]  Noncompliance with medications [Z91.148]  Uncontrolled hypertension [I10]  Other congestive heart failure (HCC) [I50.9]  Acute renal failure, unspecified acute renal failure type [N17.9]  Acute on chronic congestive heart failure, unspecified heart failure type (HCC) [I50.9]         Patient Admission Status: Inpatient  Date Admitted to INP: 7/7/25  RUR: Readmission Risk Score: 14.5    Hospitalization in the last 30 days (Readmission):  No        Advance Care Planning:  Code Status: Full Code  Primary Healthcare Decision Maker: (P) Legal Next of Kin   Advance Directive: legal next of kin     __________________________________________________________________________  Assessment:      07/08/25 1509   Service Assessment   Patient Orientation Alert and Oriented   Cognition Alert   History Provided By Patient   Primary Caregiver Self   Support Systems Spouse/Significant Other   Patient's Healthcare Decision Maker is: Legal Next of Kin   PCP Verified by CM Yes   Last Visit to PCP Within last 6 months   Prior Functional Level Independent in ADLs/IADLs   Current Functional Level Independent in ADLs/IADLs   Can patient return to prior living arrangement Yes   Ability to make needs known: Good   Family able to assist with home care needs: No   Would you like for me to discuss the discharge plan with any other family members/significant others, and if so, who? No   Financial Resources Other (Comment)  (commercial insurance)   Social/Functional History   Lives With Spouse   Type of Home House   Home Layout Multi-level   Bathroom Shower/Tub None   Bathroom Equipment None   Home Equipment None   Prior Level of Assist for ADLs Independent   Prior Level of Assist for

## 2025-07-08 NOTE — ED NOTES
TRANSFER - OUT REPORT:    Verbal report given to KEVIN Barahona on Jim Lucero  being transferred to [facility] for routine progression of patient care       Report consisted of patient's Situation, Background, Assessment and   Recommendations(SBAR).     Information from the following report(s) Nurse Handoff Report, ED Encounter Summary, ED SBAR, Intake/Output, MAR, Cardiac Rhythm NSR, Neuro Assessment, and Event Log was reviewed with the receiving nurse.    [place] Fall Assessment:    Presents to emergency department  because of falls (Syncope, seizure, or loss of consciousness): No  Age > 70: No  Altered Mental Status, Intoxication with alcohol or substance confusion (Disorientation, impaired judgment, poor safety awaremess, or inability to follow instructions): No  Impaired Mobility: Ambulates or transfers with assistive devices or assistance; Unable to ambulate or transer.: No             Lines:   Peripheral IV 07/07/25 Right Antecubital (Active)   Site Assessment Clean, dry & intact 07/07/25 2058   Line Status Normal saline locked 07/07/25 2058   Line Care Connections checked and tightened 07/07/25 2058   Phlebitis Assessment No symptoms 07/07/25 2058   Infiltration Assessment 0 07/07/25 2058   Alcohol Cap Used No 07/07/25 2058   Dressing Status Clean, dry & intact 07/07/25 2058   Dressing Type Transparent 07/07/25 2058   Dressing Intervention New 07/07/25 2058        Opportunity for questions and clarification was provided.      Patient transported with:  Monitor and Registered Nurse

## 2025-07-08 NOTE — CONSULTS
NEPHROLOGY CONSULT NOTE     Patient: Jim Lucero MRN: [id number]  PCP: Jairo Patel MD   :     1975  Age:   50 y.o.  Sex:  male      Referring physician: Ron Lane MD  Reason for consultation: 50 y.o. male with Acute renal failure (ARF) [N17.9]   Chief Complaint: SOB, Edema  Admission Date: 2025  8:56 PM  LOS: 1 day      ASSESSMENT and PLAN :   GUILLAUME on CKD  - Serum creatinine 7.12 up from 1.74 in , no recent labs available for comparison.  Seen by Dr. Rodriguez in the past, lost to follow-up over the past several years  - Etiology unclear, considerations include progression of underlying disease, hypertensive crisis, CRS  - No emergent indication for RRT at this time  - Send urine chemistries, UA  - Renal ultrasound ordered by primary team, pending  - Agree with diuresis  - Renally dose medications, avoid nephrotoxins  - Trend renal indices daily    Hypertensive urgency  - SBP greater than 200 on arrival, improved with IV antihypertensives  - Apparently off home antihypertensives for an extended period of time, resume home medications    CHF exacerbation  Volume overload  - Continue diuresis and monitor urine output/weight    Anemia of chronic disease  Diabetes    Care Plan discussed with: Patient        Thank you for consulting [facility] in the care of your patient.      Subjective:   HPI: Jim Lucero is a 50 y.o.  male with past medical history of CKD, CHF, anemia, diabetes, hypertension who has been admitted to the hospital for GUILLAUME, hypertensive urgency.  Patient presented to the emergency department with complaint of lower extremity edema, shortness of breath, dyspnea on exertion.  States that generally he is able to resolve lower extremity edema with rest and limb elevation, now persisting despite treatments.  Associated symptoms of shortness of breath, dyspnea with exertion.  Evaluation in the emergency department revealed elevated

## 2025-07-08 NOTE — ED PROVIDER NOTES
is warm.   Neurological:      General: No focal deficit present.      Mental Status: He is alert and oriented to person, place, and time. Mental status is at baseline.   Psychiatric:         Behavior: Behavior normal.         DIAGNOSTIC RESULTS     EKG: All EKG's are interpreted by the Emergency Department Physician who either signs or Co-signs this chart in the absence of a cardiologist.        RADIOLOGY:   Non-plain film images such as CT, Ultrasound and MRI are read by the radiologist. Plain radiographic images are visualized and preliminarily interpreted by the emergency physician with the below findings:        Interpretation per the Radiologist below, if available at the time of this note:    XR CHEST (2 VW)   Final Result      Moderate pulmonary edema and small pleural effusions.         Electronically signed by Chito Ellsworth           LABS:  Labs Reviewed   CBC WITH AUTO DIFFERENTIAL - Abnormal; Notable for the following components:       Result Value    RBC 3.58 (*)     Hemoglobin 9.9 (*)     Hematocrit 30.1 (*)     Neutrophils % 79.7 (*)     Lymphocytes % 10.1 (*)     Neutrophils Absolute 8.15 (*)     All other components within normal limits   COMPREHENSIVE METABOLIC PANEL - Abnormal; Notable for the following components:    Glucose 130 (*)     BUN 74 (*)     Creatinine 7.12 (*)     BUN/Creatinine Ratio 10 (*)     Est, Glom Filt Rate 9 (*)     Calcium 8.4 (*)     Albumin 3.1 (*)     Globulin 4.3 (*)     Albumin/Globulin Ratio 0.7 (*)     All other components within normal limits   TROPONIN - Abnormal; Notable for the following components:    Troponin, High Sensitivity 166 (*)     All other components within normal limits   BRAIN NATRIURETIC PEPTIDE - Abnormal; Notable for the following components:    NT Pro-BNP 24,525 (*)     All other components within normal limits       All other labs were within normal range or not returned as of this dictation.    EMERGENCY DEPARTMENT COURSE and DIFFERENTIAL

## 2025-07-08 NOTE — CONSULTS
[facility]                       Cardiology Care Note     [x]Initial Encounter     []Follow-up    Patient Name: Jim Lucero - :1975 - MRN:[id number]  Primary Cardiologist: Last in [facility] office    Consulting Cardiologist: Clint King MD     Reason for encounter:  CHF and renal failure       HPI:       Jim Lucero is a 50 y.o. male with PMH significant for HTN, CKD, Obesity, HFpEF p/w worsening LE edema, SOB, orthopnea over several months.  He has to sleep sitting up in a chair. He has not seen PCP or Cardiology in a while and has been off of all meds > 1 year.      Labs in ED notable for BNP 24 k, Cr 7  BP on arrival 236/133 --> given IV hydralazine and labetalol in ED   CXR with pulm edema            Assessment and Plan       HTN Urgency   - /136 on arrival -- comes in with decompensated CHF and progressive renal disease   - has not been compliant with meds > 1 year PTA - he had LE edema from amlodipine in past  ---> Have now started PO hydralazine and norvasc and plan to bring down BP gradually     Acute CHF  - Echo 10/13/22 - mod LVH, LVEF 65% - strain pattern suspicious for Amyloidosis (GLS -12%)   - p/w worsening SOB, orthopnea, LE edema   - BNP 24 k  - CXR with mod pulm edema and small pleural effusions   ---> Proceed with IV lasix BID (on 80 mg right now but can increase dose / frequency as needed for adequate output)     GUILLAUME on CKD  - Cr 7 on arrival (Cr was 1.7 in )   - follow renal function with diuresis   - Nephrology seeing     Non-MI troponin elevation in setting of Acute HF, uncontrolled HTN, and GUILLAUME/CKD    Obesity   - needs weight loss long term     DM  - per medicine team   - cont statin for risk reduction              ____________________________________________________________    Cardiac testing    Telemetry checked - sinus     EKG 25 - sinus tach, NSST changes     CXR 25 - mod pulm

## 2025-07-08 NOTE — ED TRIAGE NOTES
Patient ambulatory to Triage with c/o shortness of breath and lower leg swelling    States that he has not taken his medication in about year    Reports that he has been having difficulty with sleeping at night due to having SOB     Denies any chest pain    Hx of CHF, CKD, DM

## 2025-07-09 ENCOUNTER — APPOINTMENT (OUTPATIENT)
Dept: VASCULAR SURGERY | Facility: HOSPITAL | Age: 50
DRG: 682 | End: 2025-07-09

## 2025-07-09 LAB
ANION GAP SERPL CALC-SCNC: 10 MMOL/L (ref 2–12)
BASOPHILS # BLD: 0.04 K/UL (ref 0–0.1)
BASOPHILS NFR BLD: 0.4 % (ref 0–1)
BUN SERPL-MCNC: 84 MG/DL (ref 6–20)
BUN/CREAT SERPL: 12 (ref 12–20)
CALCIUM SERPL-MCNC: 8.4 MG/DL (ref 8.5–10.1)
CHLORIDE SERPL-SCNC: 105 MMOL/L (ref 97–108)
CO2 SERPL-SCNC: 21 MMOL/L (ref 21–32)
CREAT SERPL-MCNC: 7.11 MG/DL (ref 0.7–1.3)
DIFFERENTIAL METHOD BLD: ABNORMAL
EKG ATRIAL RATE: 108 BPM
EKG DIAGNOSIS: NORMAL
EKG P AXIS: 59 DEGREES
EKG P-R INTERVAL: 128 MS
EKG Q-T INTERVAL: 354 MS
EKG QRS DURATION: 76 MS
EKG QTC CALCULATION (BAZETT): 474 MS
EKG R AXIS: 24 DEGREES
EKG T AXIS: 123 DEGREES
EKG VENTRICULAR RATE: 108 BPM
EOSINOPHIL # BLD: 0.42 K/UL (ref 0–0.4)
EOSINOPHIL NFR BLD: 4.6 % (ref 0–7)
ERYTHROCYTE [DISTWIDTH] IN BLOOD BY AUTOMATED COUNT: 13.6 % (ref 11.5–14.5)
GLUCOSE BLD STRIP.AUTO-MCNC: 126 MG/DL (ref 65–117)
GLUCOSE BLD STRIP.AUTO-MCNC: 139 MG/DL (ref 65–117)
GLUCOSE BLD STRIP.AUTO-MCNC: 149 MG/DL (ref 65–117)
GLUCOSE BLD STRIP.AUTO-MCNC: 166 MG/DL (ref 65–117)
GLUCOSE SERPL-MCNC: 119 MG/DL (ref 65–100)
HCT VFR BLD AUTO: 30.6 % (ref 36.6–50.3)
HGB BLD-MCNC: 9.9 G/DL (ref 12.1–17)
IMM GRANULOCYTES # BLD AUTO: 0.03 K/UL (ref 0–0.04)
IMM GRANULOCYTES NFR BLD AUTO: 0.3 % (ref 0–0.5)
LYMPHOCYTES # BLD: 0.9 K/UL (ref 0.8–3.5)
LYMPHOCYTES NFR BLD: 9.8 % (ref 12–49)
MCH RBC QN AUTO: 26.9 PG (ref 26–34)
MCHC RBC AUTO-ENTMCNC: 32.4 G/DL (ref 30–36.5)
MCV RBC AUTO: 83.2 FL (ref 80–99)
MONOCYTES # BLD: 0.71 K/UL (ref 0–1)
MONOCYTES NFR BLD: 7.7 % (ref 5–13)
NEUTS SEG # BLD: 7.1 K/UL (ref 1.8–8)
NEUTS SEG NFR BLD: 77.2 % (ref 32–75)
NRBC # BLD: 0 K/UL (ref 0–0.01)
NRBC BLD-RTO: 0 PER 100 WBC
PLATELET # BLD AUTO: 266 K/UL (ref 150–400)
PMV BLD AUTO: 10.8 FL (ref 8.9–12.9)
POTASSIUM SERPL-SCNC: 4.4 MMOL/L (ref 3.5–5.1)
RBC # BLD AUTO: 3.68 M/UL (ref 4.1–5.7)
SERVICE CMNT-IMP: ABNORMAL
SODIUM SERPL-SCNC: 136 MMOL/L (ref 136–145)
WBC # BLD AUTO: 9.2 K/UL (ref 4.1–11.1)

## 2025-07-09 PROCEDURE — 99232 SBSQ HOSP IP/OBS MODERATE 35: CPT | Performed by: SPECIALIST

## 2025-07-09 PROCEDURE — 6370000000 HC RX 637 (ALT 250 FOR IP)

## 2025-07-09 PROCEDURE — 6370000000 HC RX 637 (ALT 250 FOR IP): Performed by: INTERNAL MEDICINE

## 2025-07-09 PROCEDURE — 2500000003 HC RX 250 WO HCPCS

## 2025-07-09 PROCEDURE — APPSS30 APP SPLIT SHARED TIME 16-30 MINUTES: Performed by: NURSE PRACTITIONER

## 2025-07-09 PROCEDURE — 94761 N-INVAS EAR/PLS OXIMETRY MLT: CPT

## 2025-07-09 PROCEDURE — 80048 BASIC METABOLIC PNL TOTAL CA: CPT

## 2025-07-09 PROCEDURE — 36415 COLL VENOUS BLD VENIPUNCTURE: CPT

## 2025-07-09 PROCEDURE — 6370000000 HC RX 637 (ALT 250 FOR IP): Performed by: SPECIALIST

## 2025-07-09 PROCEDURE — 99232 SBSQ HOSP IP/OBS MODERATE 35: CPT | Performed by: STUDENT IN AN ORGANIZED HEALTH CARE EDUCATION/TRAINING PROGRAM

## 2025-07-09 PROCEDURE — 6360000002 HC RX W HCPCS

## 2025-07-09 PROCEDURE — 93010 ELECTROCARDIOGRAM REPORT: CPT | Performed by: SPECIALIST

## 2025-07-09 PROCEDURE — 2000000000 HC ICU R&B

## 2025-07-09 PROCEDURE — 82962 GLUCOSE BLOOD TEST: CPT

## 2025-07-09 PROCEDURE — 85025 COMPLETE CBC W/AUTO DIFF WBC: CPT

## 2025-07-09 PROCEDURE — 6360000002 HC RX W HCPCS: Performed by: INTERNAL MEDICINE

## 2025-07-09 RX ORDER — LABETALOL HYDROCHLORIDE 5 MG/ML
20 INJECTION, SOLUTION INTRAVENOUS ONCE
Status: COMPLETED | OUTPATIENT
Start: 2025-07-09 | End: 2025-07-09

## 2025-07-09 RX ORDER — CARVEDILOL 12.5 MG/1
25 TABLET ORAL 2 TIMES DAILY
Status: DISCONTINUED | OUTPATIENT
Start: 2025-07-09 | End: 2025-07-10 | Stop reason: HOSPADM

## 2025-07-09 RX ORDER — HYDRALAZINE HYDROCHLORIDE 25 MG/1
100 TABLET, FILM COATED ORAL 3 TIMES DAILY
Status: DISCONTINUED | OUTPATIENT
Start: 2025-07-09 | End: 2025-07-10

## 2025-07-09 RX ADMIN — HYDRALAZINE HYDROCHLORIDE 50 MG: 25 TABLET ORAL at 06:07

## 2025-07-09 RX ADMIN — FUROSEMIDE 80 MG: 10 INJECTION, SOLUTION INTRAMUSCULAR; INTRAVENOUS at 17:23

## 2025-07-09 RX ADMIN — FUROSEMIDE 80 MG: 10 INJECTION, SOLUTION INTRAMUSCULAR; INTRAVENOUS at 08:32

## 2025-07-09 RX ADMIN — HYDRALAZINE HYDROCHLORIDE 100 MG: 25 TABLET ORAL at 12:38

## 2025-07-09 RX ADMIN — SODIUM CHLORIDE, PRESERVATIVE FREE 10 ML: 5 INJECTION INTRAVENOUS at 20:21

## 2025-07-09 RX ADMIN — AMLODIPINE BESYLATE 10 MG: 5 TABLET ORAL at 08:32

## 2025-07-09 RX ADMIN — ROSUVASTATIN CALCIUM 10 MG: 10 TABLET, FILM COATED ORAL at 20:23

## 2025-07-09 RX ADMIN — HYDRALAZINE HYDROCHLORIDE 100 MG: 25 TABLET ORAL at 20:23

## 2025-07-09 RX ADMIN — LABETALOL HYDROCHLORIDE 20 MG: 5 INJECTION, SOLUTION INTRAVENOUS at 03:25

## 2025-07-09 RX ADMIN — CARVEDILOL 25 MG: 12.5 TABLET, FILM COATED ORAL at 20:23

## 2025-07-09 RX ADMIN — CARVEDILOL 25 MG: 12.5 TABLET, FILM COATED ORAL at 12:38

## 2025-07-09 ASSESSMENT — PAIN SCALES - GENERAL: PAINLEVEL_OUTOF10: 0

## 2025-07-09 NOTE — CARE COORDINATION
7/9/25  3:35 PM    Care Management Progress Note    Reason for Admission:   Elevated troponin [R79.89]  Acute renal failure (ARF) [N17.9]  Noncompliance with medications [Z91.148]  Uncontrolled hypertension [I10]  Other congestive heart failure (HCC) [I50.9]  Acute renal failure, unspecified acute renal failure type [N17.9]  Acute on chronic congestive heart failure, unspecified heart failure type (HCC) [I50.9]         Patient Admission Status: Inpatient  RUR: 15%  Hospitalization in the last 30 days (Readmission):  No        Transition of care plan:  Ongoing medical management  Discharge plan:  Home with spouse when medically stable  Discharge plan communicated with patient and/or discharge caregiver: Yes    Outpatient follow-up.  Transport at discharge: KHARI Weems  [facility]      Available via [employer]

## 2025-07-10 ENCOUNTER — APPOINTMENT (OUTPATIENT)
Dept: VASCULAR SURGERY | Facility: HOSPITAL | Age: 50
DRG: 682 | End: 2025-07-10

## 2025-07-10 VITALS
HEART RATE: 74 BPM | HEIGHT: 68 IN | OXYGEN SATURATION: 95 % | WEIGHT: 255.07 LBS | RESPIRATION RATE: 27 BRPM | BODY MASS INDEX: 38.66 KG/M2 | SYSTOLIC BLOOD PRESSURE: 104 MMHG | DIASTOLIC BLOOD PRESSURE: 65 MMHG | TEMPERATURE: 98.2 F

## 2025-07-10 PROBLEM — I16.1 HYPERTENSIVE EMERGENCY: Status: RESOLVED | Noted: 2025-07-08 | Resolved: 2025-07-10

## 2025-07-10 LAB
ANION GAP SERPL CALC-SCNC: 10 MMOL/L (ref 2–12)
BASOPHILS # BLD: 0.04 K/UL (ref 0–0.1)
BASOPHILS NFR BLD: 0.4 % (ref 0–1)
BUN SERPL-MCNC: 81 MG/DL (ref 6–20)
BUN/CREAT SERPL: 11 (ref 12–20)
CALCIUM SERPL-MCNC: 8.4 MG/DL (ref 8.5–10.1)
CHLORIDE SERPL-SCNC: 104 MMOL/L (ref 97–108)
CO2 SERPL-SCNC: 24 MMOL/L (ref 21–32)
CREAT SERPL-MCNC: 7.57 MG/DL (ref 0.7–1.3)
DIFFERENTIAL METHOD BLD: ABNORMAL
EOSINOPHIL # BLD: 0.56 K/UL (ref 0–0.4)
EOSINOPHIL NFR BLD: 6 % (ref 0–7)
ERYTHROCYTE [DISTWIDTH] IN BLOOD BY AUTOMATED COUNT: 13.4 % (ref 11.5–14.5)
GLUCOSE BLD STRIP.AUTO-MCNC: 121 MG/DL (ref 65–117)
GLUCOSE BLD STRIP.AUTO-MCNC: 129 MG/DL (ref 65–117)
GLUCOSE BLD STRIP.AUTO-MCNC: 138 MG/DL (ref 65–117)
GLUCOSE BLD STRIP.AUTO-MCNC: 142 MG/DL (ref 65–117)
GLUCOSE SERPL-MCNC: 125 MG/DL (ref 65–100)
HCT VFR BLD AUTO: 30.2 % (ref 36.6–50.3)
HGB BLD-MCNC: 9.8 G/DL (ref 12.1–17)
IMM GRANULOCYTES # BLD AUTO: 0.05 K/UL (ref 0–0.04)
IMM GRANULOCYTES NFR BLD AUTO: 0.5 % (ref 0–0.5)
LYMPHOCYTES # BLD: 1.25 K/UL (ref 0.8–3.5)
LYMPHOCYTES NFR BLD: 13.3 % (ref 12–49)
MCH RBC QN AUTO: 27.6 PG (ref 26–34)
MCHC RBC AUTO-ENTMCNC: 32.5 G/DL (ref 30–36.5)
MCV RBC AUTO: 85.1 FL (ref 80–99)
MONOCYTES # BLD: 0.8 K/UL (ref 0–1)
MONOCYTES NFR BLD: 8.5 % (ref 5–13)
NEUTS SEG # BLD: 6.7 K/UL (ref 1.8–8)
NEUTS SEG NFR BLD: 71.3 % (ref 32–75)
NRBC # BLD: 0 K/UL (ref 0–0.01)
NRBC BLD-RTO: 0 PER 100 WBC
PLATELET # BLD AUTO: 284 K/UL (ref 150–400)
PMV BLD AUTO: 11 FL (ref 8.9–12.9)
POTASSIUM SERPL-SCNC: 4.6 MMOL/L (ref 3.5–5.1)
RBC # BLD AUTO: 3.55 M/UL (ref 4.1–5.7)
SERVICE CMNT-IMP: ABNORMAL
SODIUM SERPL-SCNC: 138 MMOL/L (ref 136–145)
UUN UR-MCNC: 398 MG/DL
WBC # BLD AUTO: 9.4 K/UL (ref 4.1–11.1)

## 2025-07-10 PROCEDURE — 85025 COMPLETE CBC W/AUTO DIFF WBC: CPT

## 2025-07-10 PROCEDURE — 6370000000 HC RX 637 (ALT 250 FOR IP)

## 2025-07-10 PROCEDURE — 99232 SBSQ HOSP IP/OBS MODERATE 35: CPT | Performed by: STUDENT IN AN ORGANIZED HEALTH CARE EDUCATION/TRAINING PROGRAM

## 2025-07-10 PROCEDURE — 94761 N-INVAS EAR/PLS OXIMETRY MLT: CPT

## 2025-07-10 PROCEDURE — 82962 GLUCOSE BLOOD TEST: CPT

## 2025-07-10 PROCEDURE — 99232 SBSQ HOSP IP/OBS MODERATE 35: CPT | Performed by: SPECIALIST

## 2025-07-10 PROCEDURE — 93970 EXTREMITY STUDY: CPT

## 2025-07-10 PROCEDURE — 80048 BASIC METABOLIC PNL TOTAL CA: CPT

## 2025-07-10 PROCEDURE — 84540 ASSAY OF URINE/UREA-N: CPT

## 2025-07-10 PROCEDURE — 6360000002 HC RX W HCPCS: Performed by: INTERNAL MEDICINE

## 2025-07-10 PROCEDURE — 6370000000 HC RX 637 (ALT 250 FOR IP): Performed by: INTERNAL MEDICINE

## 2025-07-10 PROCEDURE — 36415 COLL VENOUS BLD VENIPUNCTURE: CPT

## 2025-07-10 RX ORDER — ROSUVASTATIN CALCIUM 10 MG/1
10 TABLET, COATED ORAL NIGHTLY
Qty: 30 TABLET | Refills: 0 | Status: SHIPPED | OUTPATIENT
Start: 2025-07-10

## 2025-07-10 RX ORDER — AMLODIPINE BESYLATE 10 MG/1
10 TABLET ORAL DAILY
Qty: 30 TABLET | Refills: 0 | Status: SHIPPED | OUTPATIENT
Start: 2025-07-11

## 2025-07-10 RX ORDER — CARVEDILOL 25 MG/1
25 TABLET ORAL 2 TIMES DAILY
Qty: 60 TABLET | Refills: 0 | Status: SHIPPED | OUTPATIENT
Start: 2025-07-10

## 2025-07-10 RX ORDER — HYDRALAZINE HYDROCHLORIDE 50 MG/1
50 TABLET, FILM COATED ORAL 3 TIMES DAILY
Qty: 90 TABLET | Refills: 0 | Status: SHIPPED | OUTPATIENT
Start: 2025-07-10 | End: 2025-08-09

## 2025-07-10 RX ORDER — FUROSEMIDE 80 MG/1
80 TABLET ORAL 2 TIMES DAILY
Qty: 60 TABLET | Refills: 0 | Status: SHIPPED | OUTPATIENT
Start: 2025-07-10

## 2025-07-10 RX ADMIN — CARVEDILOL 25 MG: 12.5 TABLET, FILM COATED ORAL at 07:57

## 2025-07-10 RX ADMIN — HYDRALAZINE HYDROCHLORIDE 100 MG: 25 TABLET ORAL at 07:56

## 2025-07-10 RX ADMIN — FUROSEMIDE 80 MG: 10 INJECTION, SOLUTION INTRAMUSCULAR; INTRAVENOUS at 08:02

## 2025-07-10 RX ADMIN — AMLODIPINE BESYLATE 10 MG: 5 TABLET ORAL at 07:57

## 2025-07-10 NOTE — PLAN OF CARE
Problem: Chronic Conditions and Co-morbidities  Goal: Patient's chronic conditions and co-morbidity symptoms are monitored and maintained or improved  Outcome: Adequate for Discharge  Flowsheets (Taken 7/10/2025 0800)  Care Plan - Patient's Chronic Conditions and Co-Morbidity Symptoms are Monitored and Maintained or Improved: Monitor and assess patient's chronic conditions and comorbid symptoms for stability, deterioration, or improvement     Problem: Discharge Planning  Goal: Discharge to home or other facility with appropriate resources  Outcome: Adequate for Discharge  Flowsheets (Taken 7/10/2025 0800)  Discharge to home or other facility with appropriate resources: Identify barriers to discharge with patient and caregiver     Problem: Pain  Goal: Verbalizes/displays adequate comfort level or baseline comfort level  Outcome: Adequate for Discharge     Problem: Safety - Adult  Goal: Free from fall injury  Outcome: Adequate for Discharge

## 2025-07-10 NOTE — DISCHARGE INSTRUCTIONS
dosages, and times to be taken in your wallet.   Do not take other medications without consulting your doctor.   If you have any questions about your medications or other instructions, please talk to your nurse or care provider before you leave the hospital.     Information obtained by:     I understand that if any problems occur once I am at home I am to contact my physician.    These instructions were explained to me and I had the opportunity to ask questions.    I understand and acknowledge receipt of the instructions indicated above.                                                                                                                                               Physician's or R.N.'s Signature                                                                  Date/Time                                                                                                                                              Patient or Representative Signature                                                          Date/Time

## 2025-07-10 NOTE — DISCHARGE SUMMARY
own with elevation but has now become persistent.  Now has to sleep sitting up in a chair.  Has been lost to follow-up with PCP and cardiologist and has been off all medications for over 1 year.  Was unaware that he needed regular follow-up for medical conditions as he felt well.  Denies fever/chills/cough/chest pain.  No abdominal pain.  No dysuria.  Continues to urinate at baseline.  Unsure of blood pressure at home.  Unsure of dry weight at home.  No headache/dizziness.      Hospital course  Jim Lucero is a 50 y.o. male  with a PMH of HFpEF, HLD, HTN,CKD3b  who is admitted for severe stage III GUILLAUME 2/2 hypertensive emergency.      Hypertensive emergency, resolved:   Pt with SPB >220 with ARF. Likely 2/2 heart failure exacerbation as below versus being off all medications.. POA BP was 236/133. EKG still pending upload but no ST elevations per chart review.. Troponin elevated at 166. CXR showing moderate pulmonary edema with bilateral pleural effusions. Home anti-HTN regimen includes losartan 100 daily, hydralazine 100 3 times daily, metoprolol 50 twice daily, though has been off medicines for over a year. UDS negative for any drug use.   -Cardiology consulted:              -restarted amlodipine 10 and hydralazine 100 mg PO TID               -restarted CoReg 25 mg               -80 mg IV Lasix BID     CHF exacerbation: likely secondary to medication noncompliance. Echo EF of 65% 2 years prior.  BNP is 24K. POA body weight 267. Home regimen includes  losartan 100 daily, hydralazine 100 3 times daily, metoprolol 50 twice daily though off meds 1 yr.  -ECHO: unchanged from prior with EF of 60%, strain pattern   suspicious of amyloidosis               -per chart review patient has not had a workup of cardiac   amyloidosis --> gammopathy eval ordered by Bensussen Deutsch      Acute renal failure on CKD 3B: admission Cr 7.12 w GFR 9. Baseline Cr unknown.  Etiology may be secondary to hypertensive emergency as above versus CHFe

## 2025-07-11 ENCOUNTER — TELEPHONE (OUTPATIENT)
Age: 50
End: 2025-07-11

## 2025-07-11 LAB
ECHO BSA: 2.41 M2
VAS LEFT BASILIC VEIN LOWER ARM MID DIAM: 0.21 CM
VAS LEFT BASILIC VEIN LOWER ARM PROX DIAM: 0.24 CM
VAS LEFT BASILIC VEIN LOWER DIST DEPTH: 0.28 CM
VAS LEFT BASILIC VEIN LOWER MID DEPTH: 0.3 CM
VAS LEFT BASILIC VEIN LOWER PROX DEPTH: 0.18 CM
VAS LEFT BASILIC VEIN UPPER ARM DIST DIAM: 0.28 CM
VAS LEFT BASILIC VEIN UPPER ARM MID DIAM: 0.41 CM
VAS LEFT BASILIC VEIN UPPER ARM PROX DIAM: 0.57 CM
VAS LEFT BASILIC VEIN UPPER DIST DEPTH: 0.68 CM
VAS LEFT BASILIC VEIN UPPER MID DEPTH: 0.82 CM
VAS LEFT BASILIC VEIN UPPER PROX DEPTH: 2.39 CM
VAS LEFT BASLIC VEIN LOWER ARM DIST DIAM: 0.22 CM
VAS LEFT CEPHALIC VEIN LOWER ARM DIST DIAM: 0.24 CM
VAS LEFT CEPHALIC VEIN LOWER ARM MID DIAM: 0.2 CM
VAS LEFT CEPHALIC VEIN LOWER ARM PROX DIAM: 0.21 CM
VAS LEFT CEPHALIC VEIN LOWER DIST DEPTH: 0.46 CM
VAS LEFT CEPHALIC VEIN LOWER MID DEPTH: 0.9 CM
VAS LEFT CEPHALIC VEIN LOWER PROX DEPTH: 1.02 CM
VAS LEFT CEPHALIC VEIN UPPER ARM DIST DIAM: 0.39 CM
VAS LEFT CEPHALIC VEIN UPPER ARM MID DIAM: 0.21 CM
VAS LEFT CEPHALIC VEIN UPPER ARM PROX DIAM: 0.21 CM
VAS LEFT CEPHALIC VEIN UPPER DIST DEPTH: 1.13 CM
VAS LEFT CEPHALIC VEIN UPPER MID DEPTH: 1.04 CM
VAS LEFT CEPHALIC VEIN UPPER PROX DEPTH: 1.21 CM
VAS RIGHT BASILIC VEIN UPPER ARM DIST DIAM: 0.23 CM
VAS RIGHT BASILIC VEIN UPPER ARM MID DIAM: 0.33 CM
VAS RIGHT BASILIC VEIN UPPER ARM PROX DIAM: 0.41 CM
VAS RIGHT BASILIC VEIN UPPER DIST DEPTH: 0.76 CM
VAS RIGHT BASILIC VEIN UPPER MID DEPTH: 1.51 CM
VAS RIGHT BASILIC VEIN UPPER PROX DEPTH: 1.74 CM
VAS RIGHT CEPHALIC VEIN LOWER ARM DIST DIAM: 0.18 CM
VAS RIGHT CEPHALIC VEIN LOWER ARM MID DIAM: 0.21 CM
VAS RIGHT CEPHALIC VEIN LOWER ARM PROX DIAM: 0.2 CM
VAS RIGHT CEPHALIC VEIN LOWER DIST DEPTH: 0.32 CM
VAS RIGHT CEPHALIC VEIN LOWER MID DEPTH: 0.3 CM
VAS RIGHT CEPHALIC VEIN LOWER PROX DEPTH: 0.52 CM
VAS RIGHT CEPHALIC VEIN UPPER ARM DIST DIAM: 0.53 CM
VAS RIGHT CEPHALIC VEIN UPPER ARM MID DIAM: 0.22 CM
VAS RIGHT CEPHALIC VEIN UPPER ARM PROX DIAM: 0.22 CM
VAS RIGHT CEPHALIC VEIN UPPER DIST DEPTH: 0.44 CM
VAS RIGHT CEPHALIC VEIN UPPER MID DEPTH: 0.71 CM
VAS RIGHT CEPHALIC VEIN UPPER PROX DEPTH: 1.03 CM

## 2025-07-11 RX ORDER — ATORVASTATIN CALCIUM 40 MG/1
40 TABLET, FILM COATED ORAL DAILY
Qty: 30 TABLET | Refills: 1 | Status: SHIPPED | OUTPATIENT
Start: 2025-07-11

## 2025-07-11 NOTE — TELEPHONE ENCOUNTER
Called patient; no answer. Left message for patient to return call to the office and that [employer] message has been sent with self scheduling link.

## 2025-07-11 NOTE — TELEPHONE ENCOUNTER
Care Transitions Initial Follow Up Call    Outreach made within 2 business days of discharge: Yes    Patient: Jim Lucero Patient : 1975   MRN: [id number]  Reason for Admission: Acute renal failure (ARF)   Discharge Date: 7/10/25       Spoke with: Left Message    Discharge department/facility: [facility]     TCM Interactive Patient Contact:  Called patient; no answer. Left message for patient to return call to the office.    Additional needs identified to be addressed with provider  No needs identified             Scheduled appointment with PCP within 7-14 days    Follow Up  Future Appointments   Date Time Provider Department Center   2025 10:20 AM Monique Beltran ACNP [facility] AMB       Gibran Doran RN

## 2025-07-11 NOTE — TELEPHONE ENCOUNTER
Patient wife calling to have refill of atorvastatin (LIPITOR) 40 MG tablet sent to [facility] pharmacy.    [facility] [address] -  P [phone] - F [phone]

## 2025-07-11 NOTE — TELEPHONE ENCOUNTER
Refill per VO of Dr. King  Last appt: 10/13/2022    Future Appointments   Date Time Provider Department Center   8/7/2025 10:20 AM Monique Beltran ACNP [facility] AM[facility]       Requested Prescriptions     Signed Prescriptions Disp Refills    atorvastatin (LIPITOR) 40 MG tablet 30 tablet 1     Sig: Take 1 tablet by mouth daily     Authorizing Provider: JASWANT IKNG     Ordering User: GOVIND THOMPSON      Opzelura Counseling:  I discussed with the patient the risks of Opzelura including but not limited to nasopharngitis, bronchitis, ear infection, eosinophila, hives, diarrhea, folliculitis, tonsillitis, and rhinorrhea.  Taken orally, this medication has been linked to serious infections; higher rate of mortality; malignancy and lymphoproliferative disorders; major adverse cardiovascular events; thrombosis; thrombocytopenia, anemia, and neutropenia; and lipid elevations.

## 2025-07-11 NOTE — TELEPHONE ENCOUNTER
----- Message -----   From: Kristin Johnson   Sent: 7/11/2025   3:39 PM EDT   To: [facility] Clinical Staff   Subject: [facility] Appointment Request                          [facility] Appointment Request     Patient needs appointment for [facility] Appointment Type: Hospital Follow Up.     Patient Requested Dates(s): Monday, Tuesday and Wednesday   Patient Requested Time: early morning schedule   Provider Name: Leobardo Maxwell MD       Reason for Appointment Request: Other - Unable to reach the practice     --------------------------------------------------------------------------------------------------------------------------     Relationship to Patient: Spouse/Partner - Maryanne - Wife      Call Back Information: OK to leave message on voicemail   Preferred Call Back Number: Phone - [phone]      Patient's mother called in to schedule hospital follow up. Also can send a message at email address of the wife here : [email or web address]

## 2025-07-11 NOTE — PROGRESS NOTES
DM[facility] CARDIOLOGY                       Cardiology Care Note     []Initial Encounter     [x]Follow-up    Patient Name: Jim Lucero - :1975 - MRN:[id number]  Primary Cardiologist: Last in [facility] office    Consulting Cardiologist: Clint King MD     Reason for encounter:  CHF and renal failure       HPI:       Jim Lucero is a 50 y.o. male with PMH significant for HTN, CKD, Obesity, HFpEF p/w worsening LE edema, SOB, orthopnea over several months.  He has to sleep sitting up in a chair. He has not seen PCP or Cardiology in a while and has been off of all meds > 1 year.      Labs in ED notable for BNP 24 k, Cr 7  BP on arrival 236/133 --> given IV hydralazine and labetalol in ED   CXR with pulm edema            Assessment and Plan       HTN Urgency   - /136 on arrival -- comes in with decompensated CHF and progressive renal disease   - has not been compliant with meds > 1 year PTA - he had LE edema from amlodipine in past  ---started PO hydralazine, will increase to 100 mg TID  - cont norvasc 10 mg qd       Acute CHF  - Echo 10/13/22 - mod LVH, LVEF 65% - strain pattern suspicious for Amyloidosis (GLS -12%)   - LVEF 60% no WMA grade 2 diastolic dysfunction   - p/w worsening SOB, orthopnea, LE edema   - BNP 24 k, trend   - CXR with mod pulm edema and small pleural effusions   --lasix 80 mg IV BID     GUILLAUME on CKD  - Cr 7 on arrival (Cr was 1.7 in )   - 7.11 today   - Nephrology seeing     - Non-MI troponin elevation in setting of Acute HF, uncontrolled HTN, and GUILLAUME/CKD    Obesity   - needs weight loss long term   - Body mass index is 40.6 kg/m².    DM  - per medicine team   - cont statin for risk reduction       CARDIOLOGY ATTENDING  Patient personally seen and examined. All the elements of history and examination were personally performed. Assessment and plan was discussed and agree.      Says swelling better - urine 
                            DMITRY DURBIN [facility]    Jim Lucero  YOB: 1975          Assessment & Plan:     GUILLAUME + progression of CKD  H/o CKD 3b, f/b Dr. Rodriguez, lost to follow up. Cr 1.7 in early 2023  Uncontrolled HTN  Volume overload with asymmetrical edema  DM2  Proteinuira, serologies negative in 2020    Rec:  Continue IV lasix  BP overcontrolled. Hold hydralazine  For vein mapping today and AV access as outpt  OK to eat from my standpoint  Consider LE doppler due to L>R edema  He will need close nephrology follow up after d/c. I will arrange       Subjective:   CC: GUILLAUME, CKD  HPI: Creat in 7s and stable. He is feeling better. On a little O2 via NC. To have vein mapping today.  Current Facility-Administered Medications   Medication Dose Route Frequency    hydrALAZINE (APRESOLINE) tablet 100 mg  100 mg Oral TID    carvedilol (COREG) tablet 25 mg  25 mg Oral BID    glucose chewable tablet 16 g  4 tablet Oral PRN    dextrose bolus 10% 125 mL  125 mL IntraVENous PRN    Or    dextrose bolus 10% 250 mL  250 mL IntraVENous PRN    glucagon injection 1 mg  1 mg SubCUTAneous PRN    dextrose 10 % infusion   IntraVENous Continuous PRN    insulin lispro (HUMALOG,ADMELOG) injection vial 0-4 Units  0-4 Units SubCUTAneous 4x Daily AC & HS    furosemide (LASIX) injection 80 mg  80 mg IntraVENous BID    amLODIPine (NORVASC) tablet 10 mg  10 mg Oral Daily    rosuvastatin (CRESTOR) tablet 10 mg  10 mg Oral Nightly    sodium chloride flush 0.9 % injection 5-40 mL  5-40 mL IntraVENous 2 times per day    sodium chloride flush 0.9 % injection 5-40 mL  5-40 mL IntraVENous PRN    0.9 % sodium chloride infusion   IntraVENous PRN    ondansetron (ZOFRAN-ODT) disintegrating tablet 4 mg  4 mg Oral Q8H PRN    Or    ondansetron (ZOFRAN) injection 4 mg  4 mg IntraVENous Q6H PRN    polyethylene glycol (GLYCOLAX) packet 17 g  17 g Oral Daily PRN    acetaminophen (TYLENOL) tablet 650 mg  650 mg Oral Q6H PRN    
                            DMITRY DURBIN [facility] [place][note 2]    Jim Lucero  YOB: 1975          Assessment & Plan:     GUILLAUME + progression of CKD  H/o CKD 3b, f/b Dr. Rodriguez, lost to follow up. Cr 1.7 in early 2023  Uncontrolled HTN  Volume overload  DM2  Proteinuira, serologies negative in 2020    Rec:  Continue IV lasix  Add coreg  Consult vascular for AVF or AVG       Subjective:   CC: GUILLAUME, CKD  HPI: Creat stable at 7, likely baseline. Volume status better. BP still high. Not uremic. He has read about options for dialysis and is interested in ICHD. He does not want to do PD. We discussed need for dialysis soon. Ultimately, he wants to pursue transplant but that will take time and he is aware he will need dialysis as a bridge to transplant.  Current Facility-Administered Medications   Medication Dose Route Frequency    hydrALAZINE (APRESOLINE) tablet 100 mg  100 mg Oral TID    carvedilol (COREG) tablet 25 mg  25 mg Oral BID    glucose chewable tablet 16 g  4 tablet Oral PRN    dextrose bolus 10% 125 mL  125 mL IntraVENous PRN    Or    dextrose bolus 10% 250 mL  250 mL IntraVENous PRN    glucagon injection 1 mg  1 mg SubCUTAneous PRN    dextrose 10 % infusion   IntraVENous Continuous PRN    insulin lispro (HUMALOG,ADMELOG) injection vial 0-4 Units  0-4 Units SubCUTAneous 4x Daily AC & HS    furosemide (LASIX) injection 80 mg  80 mg IntraVENous BID    amLODIPine (NORVASC) tablet 10 mg  10 mg Oral Daily    rosuvastatin (CRESTOR) tablet 10 mg  10 mg Oral Nightly    sodium chloride flush 0.9 % injection 5-40 mL  5-40 mL IntraVENous 2 times per day    sodium chloride flush 0.9 % injection 5-40 mL  5-40 mL IntraVENous PRN    0.9 % sodium chloride infusion   IntraVENous PRN    ondansetron (ZOFRAN-ODT) disintegrating tablet 4 mg  4 mg Oral Q8H PRN    Or    ondansetron (ZOFRAN) injection 4 mg  4 mg IntraVENous Q6H PRN    polyethylene glycol (GLYCOLAX) packet 17 g  17 g Oral Daily PRN    acetaminophen 
           Subjective / Objective     Subjective  Overnight Events: NAEO.     Patient seen and examined at bedside. Reports feeling well this AM. Denies CP, SOB, N/V, dysuria. Reports his leg swelling is much improved.     Respiratory:     Vitals:    07/10/25 0603   BP: (!) 146/98   Pulse: 80   Resp: 20   Temp:    SpO2:      Physical Examination:   General appearance - alert, well appearing, and in no distress  Chest - crackles at bilateral lung bases with auscultation, no wheezes, rales or rhonchi, symmetric air entry  Heart - normal rate, regular rhythm, normal S1, S2, no murmurs, rubs, clicks or gallops,   Abdomen - soft, nontender, nondistended, no masses or organomegaly  Neurological - alert, oriented, normal speech, no focal findings  Skin - warm, dry. No notable rashes  Extremities - peripheral pulses normal, +1/+2 pedal edema, no clubbing or cyanosis  Psychiatric - normal speech and thought processes    I/O:  07/09 0701 - 07/10 0700  In: 480 [P.O.:480]  Out: 4300 [Urine:4300]    Inpatient Medications   Current Facility-Administered Medications   Medication Dose Route Frequency Provider Last Rate Last Admin    hydrALAZINE (APRESOLINE) tablet 100 mg  100 mg Oral TID Kurt Arciniega MD   100 mg at 07/10/25 0756    carvedilol (COREG) tablet 25 mg  25 mg Oral BID Yudi Rodriguez MD   25 mg at 07/10/25 0757    glucose chewable tablet 16 g  4 tablet Oral PRN Angelique Erickson MD        dextrose bolus 10% 125 mL  125 mL IntraVENous PRAngelique Maxwell MD        Or    dextrose bolus 10% 250 mL  250 mL IntraVENous PRAngelique Maxwell MD        glucagon injection 1 mg  1 mg SubCUTAneous PRN Angelique Erickson MD        dextrose 10 % infusion   IntraVENous Continuous PRAngelique Maxwell MD        insulin lispro (HUMALOG,ADMELOG) injection vial 0-4 Units  0-4 Units SubCUTAneous 4x Daily AC & HS Angelique Erickson MD   1 Units at 07/08/25 1800    furosemide (LASIX) injection 80 mg  80 mg IntraVENous BID Michael 
     Nutrition Note    Modified diet order to better fit patient needs.    Eilza Justin MS, RD, [facility]  Ext: [phone], or via [employer]      
Discussed with patient and his wife.  Vein mapping pending  Will make surgical plans when this is completed and schedule as an outpatient at a mutually convenient time.    
[place]  venous  mapping completed. Final results to follow.   
Vein mapping reviewed  He will require a left AVG  Office will reach out to him to schedule.   
mg Oral Q6H PRN    Or    acetaminophen (TYLENOL) suppository 650 mg  650 mg Rectal Q6H PRN    [Held by provider] atorvastatin (LIPITOR) tablet 40 mg  40 mg Oral Daily    furosemide (LASIX) injection 40 mg  40 mg IntraVENous BID          Objective:     Vitals:  Blood pressure (!) 175/109, pulse 95, temperature 98.3 °F (36.8 °C), temperature source Oral, resp. rate 26, height 1.727 m (5' 8\"), weight 121.1 kg (267 lb), SpO2 93%.  Temp (24hrs), Av.4 °F (36.9 °C), Min:98.3 °F (36.8 °C), Max:98.4 °F (36.9 °C)      Intake and Output:  No intake/output data recorded.   1901 -  0700  In: -   Out: 1150 [Urine:1150]    Physical Exam:               GENERAL ASSESSMENT: NAD  HEENT: Nontraumatic   CHEST: CTA  HEART: S1S2  EXTREMITY: ++EDEMA  NEURO: Grossly non focal          ECG/rhythm:    Data Review        Recent Labs     25  0107 25  0614    138 139   K 4.5 4.2 4.2    107 110*   CO2 23 23 21   BUN 74* 77* 69*   CREATININE 7.12* 7.02* 6.46*   GLUCOSE 130* 118* 112*   CALCIUM 8.4* 8.8 7.9*           : Yudi Rodriguez MD  2025        [facility]:  [email or web address]  [email or web address]    [place] office:  [address]  [place] [address]  Phone: [phone]  Fax :     [phone]    [place] office:  [address] [address]  Phone - [phone]  Fax - [phone]                                                                                        
per 24 hour   Intake --   Output 1150 ml   Net -1150 ml       Physical Exam:    Vitals:   Vitals:    07/08/25 0623 07/08/25 0630 07/08/25 0700 07/08/25 0730   BP: (!) 216/132 (!) 210/142 (!) 208/130 (!) 205/125   Pulse: 92 91 96 93   Resp: 28 16 (!) 33 24   Temp:    98.3 °F (36.8 °C)   TempSrc:    Oral   SpO2: 95% 94% 93% 93%   Weight:       Height:           Gen: Well-developed, well-nourished, in no acute distress  Neck: Supple  Resp: No accessory muscle use, mild bibasilar crackles, No rales or rhonchi  Card: Regular Rate, Rhythm, No murmurs  Abd:   Soft, non-tender, non-distended, BS+   MSK: No cyanosis  Skin: No rashes    Neuro: Moving all four extremities, follows commands appropriately  Psych: Nml Affect  LE:  +2 edema up to shin on RLE, +3 edema up to knee on LLE    Data Review:     Labs:  Recent Results (from the past 24 hours)   EKG 12 Lead    Collection Time: 07/07/25  8:50 PM   Result Value Ref Range    Ventricular Rate 108 BPM    Atrial Rate 108 BPM    P-R Interval 128 ms    QRS Duration 76 ms    Q-T Interval 354 ms    QTc Calculation (Bazett) 474 ms    P Axis 59 degrees    R Axis 24 degrees    T Axis 123 degrees    Diagnosis       Sinus tachycardia  ST & T wave abnormality, consider lateral ischemia  Abnormal ECG  When compared with ECG of 06-MAY-2020 20:45,  T wave inversion less evident in Lateral leads     CBC with Auto Differential    Collection Time: 07/07/25  8:58 PM   Result Value Ref Range    WBC 10.2 4.1 - 11.1 K/uL    RBC 3.58 (L) 4.10 - 5.70 M/uL    Hemoglobin 9.9 (L) 12.1 - 17.0 g/dL    Hematocrit 30.1 (L) 36.6 - 50.3 %    MCV 84.1 80.0 - 99.0 FL    MCH 27.7 26.0 - 34.0 PG    MCHC 32.9 30.0 - 36.5 g/dL    RDW 13.6 11.5 - 14.5 %    Platelets 231 150 - 400 K/uL    MPV 10.8 8.9 - 12.9 FL    Nucleated RBCs 0.0 0  WBC    nRBC 0.00 0.00 - 0.01 K/uL    Neutrophils % 79.7 (H) 32.0 - 75.0 %    Lymphocytes % 10.1 (L) 12.0 - 49.0 %    Monocytes % 6.8 5.0 - 13.0 %    Eosinophils % 2.7 0.0 - 7.0 
Filt Rate 8 (L) >60 ml/min/1.73m2    Calcium 8.4 (L) 8.5 - 10.1 MG/DL   POCT Glucose    Collection Time: 07/10/25  6:03 AM   Result Value Ref Range    POC Glucose 142 (H) 65 - 117 mg/dL    Performed by: Doris Chavira    POCT Glucose    Collection Time: 07/10/25  8:07 AM   Result Value Ref Range    POC Glucose 129 (H) 65 - 117 mg/dL    Performed by: Olaf Gutierrez    POCT Glucose    Collection Time: 07/10/25 11:54 AM   Result Value Ref Range    POC Glucose 138 (H) 65 - 117 mg/dL    Performed by: LOUIE FELICIANO (PCT)    Vascular duplex vein mapping upper bilateral    Collection Time: 07/10/25 12:01 PM   Result Value Ref Range    Body Surface Area 2.41 m2    Right Cephalic Vein Upper Arm Prox Diam 0.22 cm    Right upper cephalic vein proximal depth 1.03 cm    Right Cephalic Vein Upper Arm Mid Diam 0.22 cm    Right upper cephalic vein mid depth 0.71 cm    Right Cephalic Vein Upper Arm Dist Diam 0.53 cm    Right upper cephalic vein distal depth 0.44 cm    Right Cephalic Vein Lower Arm Prox Diam 0.20 cm    Right forearm cephalic vein proximal depth 0.52 cm    Right Cephalic Vein Lower Arm Mid Diam 0.21 cm    Right forearm cephalic vein mid depth 0.30 cm    Right Cephalic Vein Lower Arm Dist Diam 0.18 cm    Right forearm cephalic vein distal depth 0.32 cm    Right Basilic Vein Upper Arm Prox Diam 0.41 cm    Right upper basilic vein proximal depth 1.74 cm    Right Basilic Vein Upper Arm Mid Diam 0.33 cm    Right upper basilic vein mid depth 1.51 cm    Right Basilic Vein Upper Arm Dist Diam 0.23 cm    Right upper basilic vein distal depth 0.76 cm    Left Cephalic Vein Upper Arm Prox Diam 0.21 cm    Left upper cephalic vein proximal depth 1.21 cm    Left Cephalic Vein Upper Arm Mid Diam 0.21 cm    Left upper cephalic vein mid depth 1.04 cm    Left Cephalic Vein Upper Arm Dist Diam 0.39 cm    Left upper cephalic vein distal depth 1.13 cm    Left Cephalic Vein Lower Arm Prox Diam 0.21 cm    Left forearm cephalic vein proximal 
bladder is within normal limits. Aorta and common iliac arteries not  visualized due to bowel gas. Visualized portions of IVC are within normal  limits.  Impression: Medical renal disease. No hydronephrosis. Nonobstructing right nephrolithiasis.    Electronically signed by NESTOR GRIFFITH            Assessment and Plan     Jim Lucero is a 50 y.o. male Jim Lucero is a 50 y.o. male with PMH of HFpEF, HLD, HTN,CKD3b  who is admitted for severe stage III GUILLAUME 2/2 hypertensive emergency.      Hypertensive emergency, improved:   Pt with SPB >220 with ARF. Likely 2/2 heart failure exacerbation as below versus being off all medications.. POA BP was 236/133. EKG still pending upload but no ST elevations per chart review.. Troponin elevated at 166. CXR showing moderate pulmonary edema with bilateral pleural effusions. Home anti-HTN regimen includes losartan 100 daily, hydralazine 100 3 times daily, metoprolol 50 twice daily, though has been off medicines for over a year. UDS negative for any drug use.   -Daily CMP to follow renal and hepatic function  -Daily CBC to follow platelet count  -strict I&O's and daily weights   -IV labetalol as needed and IV hydralazine as needed to obtain 20% reduction of blood pressure  -Low threshold to start nitroglycerin gtt if pressures not improved  -Cardiology consulted, appreciate rec's   -restarted amlodipine 10 and hydralazine 100 mg PO TID    -80 mg IV Lasix BID     CHF exacerbation: likely secondary to medication noncompliance. Echo EF of 65% 2 years prior.  BNP is 24K. POA body weight 267. Home regimen includes  losartan 100 daily, hydralazine 100 3 times daily, metoprolol 50 twice daily though off meds 1 yr.  -Supplemental O2 as needed, goal SpO2 >90%  -Elevate head of bed to 30-45 degrees  -Cardiology consulted; recc's as above  -Diurese with IV Lasix 80mg q12  -Strict In's and Out's   -Daily weights, patient's average weight is unknown.  -ECHO: unchanged from prior with EF

## 2025-07-21 ENCOUNTER — TELEPHONE (OUTPATIENT)
Age: 50
End: 2025-07-21

## 2025-07-21 NOTE — TELEPHONE ENCOUNTER
Pt's spouse called in requesting recommendation on statin therapy.  Their pharmacy informed them that atorvastatin & rosuvastatin were both on the med list and should not be taken together.    Looked through notes and found hospitalist discharge note 7/10/25 stated:  \"Hyperlipidemia:   - Hold home lipitor 40 and restart pending GFR recovery \"    Dr. King 7/9/25 \"DM  - per medicine team   - cont statin for risk reduction \"    Let her know that I would c/b tomorrow after clarifying with provider.    Ph: [phone].    Rosuvastatin written for 7/10/25  Atorvastatin refilled 7/11/25

## 2025-07-22 RX ORDER — ROSUVASTATIN CALCIUM 10 MG/1
10 TABLET, COATED ORAL NIGHTLY
COMMUNITY

## 2025-07-22 NOTE — PERIOP NOTE
[facility]                   [address] [place] [address]   PRE-ADMISSION TESTING    [phone]     Surgery Date:  07/28/2025         INSTRUCTIONS BEFORE YOUR SURGERY   Arrival Time [place] Pre-op staff will call you between 1:30 and 6pm the day before your surgery with your arrival time. If your surgery is on a Monday, they will call you the Friday before. If it's after 6pm the day prior to surgery and you have not received a time yet, please call [phone].   Where to Check In   Come through the Main Hospital entrance. Take the elevators on the left side of the lobby to the 2nd floor. The admitting desk will be on your right.     Please bring the following items to register:  photo ID, insurance card, co-pay if needed, medical directive, DNR, and/or POA if applicable.     Free  parking is available 7am to 5pm.   Food Drink Alcohol Marijuana    No food or drink (gum, mints, coffee, juice, etc) after midnight the night before surgery.      No alcohol (beer, wine, liquor) or marijuana 24 hours before or after surgery.           You may drink WATER ONLY up until 2 hours prior to your surgery time. Please do not      add anything to your water as this may result in your surgery being postponed.       If you are a diabetic, glucose tablets may be taken with water for low blood sugar if needed.   PRE-OP Medication Instructions      MEDICATIONS TO TAKE THE MORNING OF SURGERY:   Amlodipine/Norvasc  Carvedilol/Coreg  Hydralazine/Apresoline         MEDICATIONS NOT TO TAKE THE MORNING OF SURGERY:  Furosemide/Lasix     Medications to STOP 7 Days Before Surgery Non-Steroidal anti-inflammatory Drugs (NSAID's): for example: Ibuprofen, Advil, Motrin, Naproxen, Aleve  Aspirin and Aspirin containing products (BC Powder, Excedrin, etc.)  Weight loss and/or diabetic GLP1 medications (Wegovy, Ozempic, Semaglutide, Trulicity, Mounjaro, Zepbound, Tirzepatide, etc)  Herbal supplements,

## 2025-07-22 NOTE — PERIOP NOTE
Pre-op instructions provided to patient via telephone verbally and sent to patient's \"MyChart\" via [employer]. Patient verbalized understanding. Instructed patient to call PAT Dept with any questions or concerns.

## 2025-07-22 NOTE — TELEPHONE ENCOUNTER
R/t call to spouse,  Unable to LVM d/t VM full.  Per WILMER Gilbert NP: \"  Lipitor is fine.   \"    Called pharmacy, informed them to d/c Rosuvastatin.

## 2025-07-24 ENCOUNTER — OFFICE VISIT (OUTPATIENT)
Age: 50
End: 2025-07-24

## 2025-07-24 VITALS
BODY MASS INDEX: 39.62 KG/M2 | SYSTOLIC BLOOD PRESSURE: 122 MMHG | HEIGHT: 68 IN | WEIGHT: 261.4 LBS | TEMPERATURE: 98.2 F | DIASTOLIC BLOOD PRESSURE: 82 MMHG | RESPIRATION RATE: 18 BRPM | OXYGEN SATURATION: 95 % | HEART RATE: 70 BPM

## 2025-07-24 DIAGNOSIS — I10 ESSENTIAL (PRIMARY) HYPERTENSION: Primary | Chronic | ICD-10-CM

## 2025-07-24 DIAGNOSIS — Z09 HOSPITAL DISCHARGE FOLLOW-UP: ICD-10-CM

## 2025-07-24 DIAGNOSIS — E66.01 MORBID (SEVERE) OBESITY DUE TO EXCESS CALORIES (HCC): Chronic | ICD-10-CM

## 2025-07-24 DIAGNOSIS — I50.32 CHRONIC HEART FAILURE WITH PRESERVED EJECTION FRACTION (HCC): ICD-10-CM

## 2025-07-24 DIAGNOSIS — N18.5 STAGE 5 CHRONIC KIDNEY DISEASE NOT ON CHRONIC DIALYSIS (HCC): Chronic | ICD-10-CM

## 2025-07-24 DIAGNOSIS — E11.22 TYPE 2 DIABETES MELLITUS WITH CHRONIC KIDNEY DISEASE, WITHOUT LONG-TERM CURRENT USE OF INSULIN, UNSPECIFIED CKD STAGE (HCC): ICD-10-CM

## 2025-07-24 PROBLEM — R06.09 DYSPNEA ON EXERTION: Status: RESOLVED | Noted: 2020-05-06 | Resolved: 2025-07-24

## 2025-07-24 PROBLEM — E78.5 HYPERLIPIDEMIA: Chronic | Status: ACTIVE | Noted: 2020-06-02

## 2025-07-24 PROBLEM — N18.9 CHRONIC KIDNEY DISEASE: Chronic | Status: ACTIVE | Noted: 2020-06-02

## 2025-07-24 PROBLEM — N17.9 ACUTE RENAL FAILURE: Status: ACTIVE | Noted: 2020-06-02

## 2025-07-24 ASSESSMENT — PATIENT HEALTH QUESTIONNAIRE - PHQ9
SUM OF ALL RESPONSES TO PHQ QUESTIONS 1-9: 2
SUM OF ALL RESPONSES TO PHQ QUESTIONS 1-9: 2
1. LITTLE INTEREST OR PLEASURE IN DOING THINGS: SEVERAL DAYS
2. FEELING DOWN, DEPRESSED OR HOPELESS: SEVERAL DAYS
SUM OF ALL RESPONSES TO PHQ QUESTIONS 1-9: 2
SUM OF ALL RESPONSES TO PHQ QUESTIONS 1-9: 2

## 2025-07-28 ENCOUNTER — HOSPITAL ENCOUNTER (OUTPATIENT)
Facility: HOSPITAL | Age: 50
Setting detail: OUTPATIENT SURGERY
Discharge: HOME | End: 2025-07-28
Payer: COMMERCIAL

## 2025-07-28 ENCOUNTER — ANESTHESIA EVENT (OUTPATIENT)
Facility: HOSPITAL | Age: 50
End: 2025-07-28
Payer: COMMERCIAL

## 2025-07-28 ENCOUNTER — ANESTHESIA (OUTPATIENT)
Facility: HOSPITAL | Age: 50
End: 2025-07-28
Payer: COMMERCIAL

## 2025-07-28 VITALS
WEIGHT: 261.02 LBS | HEART RATE: 70 BPM | SYSTOLIC BLOOD PRESSURE: 114 MMHG | TEMPERATURE: 98.2 F | OXYGEN SATURATION: 95 % | BODY MASS INDEX: 39.56 KG/M2 | DIASTOLIC BLOOD PRESSURE: 71 MMHG | HEIGHT: 68 IN | RESPIRATION RATE: 15 BRPM

## 2025-07-28 DIAGNOSIS — N18.5 STAGE 5 CHRONIC KIDNEY DISEASE NOT ON CHRONIC DIALYSIS (HCC): Primary | Chronic | ICD-10-CM

## 2025-07-28 LAB
ALBUMIN SERPL-MCNC: 3.3 G/DL (ref 3.5–5)
ALBUMIN/GLOB SERPL: 0.9 (ref 1.1–2.2)
ALP SERPL-CCNC: 61 U/L (ref 45–117)
ALT SERPL-CCNC: 20 U/L (ref 12–78)
ANION GAP SERPL CALC-SCNC: 8 MMOL/L (ref 2–12)
AST SERPL-CCNC: 11 U/L (ref 15–37)
BILIRUB SERPL-MCNC: 0.3 MG/DL (ref 0.2–1)
BUN SERPL-MCNC: 92 MG/DL (ref 6–20)
BUN/CREAT SERPL: 11 (ref 12–20)
CALCIUM SERPL-MCNC: 8.2 MG/DL (ref 8.5–10.1)
CHLORIDE SERPL-SCNC: 111 MMOL/L (ref 97–108)
CO2 SERPL-SCNC: 23 MMOL/L (ref 21–32)
CREAT SERPL-MCNC: 8.3 MG/DL (ref 0.7–1.3)
GLOBULIN SER CALC-MCNC: 3.8 G/DL (ref 2–4)
GLUCOSE BLD STRIP.AUTO-MCNC: 122 MG/DL (ref 65–117)
GLUCOSE BLD STRIP.AUTO-MCNC: 159 MG/DL (ref 65–117)
GLUCOSE SERPL-MCNC: 112 MG/DL (ref 65–100)
POTASSIUM SERPL-SCNC: 4.5 MMOL/L (ref 3.5–5.1)
PROT SERPL-MCNC: 7.1 G/DL (ref 6.4–8.2)
SERVICE CMNT-IMP: ABNORMAL
SERVICE CMNT-IMP: ABNORMAL
SODIUM SERPL-SCNC: 142 MMOL/L (ref 136–145)

## 2025-07-28 PROCEDURE — 2720000010 HC SURG SUPPLY STERILE

## 2025-07-28 PROCEDURE — C1889 IMPLANT/INSERT DEVICE, NOC: HCPCS

## 2025-07-28 PROCEDURE — 3700000001 HC ADD 15 MINUTES (ANESTHESIA)

## 2025-07-28 PROCEDURE — 3600000002 HC SURGERY LEVEL 2 BASE

## 2025-07-28 PROCEDURE — 3600000012 HC SURGERY LEVEL 2 ADDTL 15MIN

## 2025-07-28 PROCEDURE — 6360000002 HC RX W HCPCS: Performed by: ANESTHESIOLOGY

## 2025-07-28 PROCEDURE — 2709999900 HC NON-CHARGEABLE SUPPLY

## 2025-07-28 PROCEDURE — 64415 NJX AA&/STRD BRCH PLXS IMG: CPT | Performed by: ANESTHESIOLOGY

## 2025-07-28 PROCEDURE — 6360000002 HC RX W HCPCS: Performed by: NURSE ANESTHETIST, CERTIFIED REGISTERED

## 2025-07-28 PROCEDURE — 82962 GLUCOSE BLOOD TEST: CPT

## 2025-07-28 PROCEDURE — 3700000000 HC ANESTHESIA ATTENDED CARE

## 2025-07-28 PROCEDURE — 2500000003 HC RX 250 WO HCPCS

## 2025-07-28 PROCEDURE — 6360000002 HC RX W HCPCS

## 2025-07-28 PROCEDURE — 2500000003 HC RX 250 WO HCPCS: Performed by: NURSE ANESTHETIST, CERTIFIED REGISTERED

## 2025-07-28 PROCEDURE — 2580000003 HC RX 258: Performed by: NURSE ANESTHETIST, CERTIFIED REGISTERED

## 2025-07-28 PROCEDURE — 80053 COMPREHEN METABOLIC PANEL: CPT

## 2025-07-28 PROCEDURE — C1768 GRAFT, VASCULAR: HCPCS

## 2025-07-28 PROCEDURE — 36415 COLL VENOUS BLD VENIPUNCTURE: CPT

## 2025-07-28 PROCEDURE — 7100000011 HC PHASE II RECOVERY - ADDTL 15 MIN

## 2025-07-28 PROCEDURE — 7100000010 HC PHASE II RECOVERY - FIRST 15 MIN

## 2025-07-28 PROCEDURE — 2580000003 HC RX 258

## 2025-07-28 DEVICE — PROPATEN VASCULAR GRAFT SW 4-7MMX45CM TAPERED HEPARIN
Type: IMPLANTABLE DEVICE | Site: ARM | Status: FUNCTIONAL
Brand: GORE PROPATEN VASCULAR GRAFT

## 2025-07-28 RX ORDER — ROPIVACAINE HYDROCHLORIDE 5 MG/ML
INJECTION, SOLUTION EPIDURAL; INFILTRATION; PERINEURAL
Status: DISCONTINUED | OUTPATIENT
Start: 2025-07-28 | End: 2025-07-28 | Stop reason: SDUPTHER

## 2025-07-28 RX ORDER — FENTANYL CITRATE 50 UG/ML
INJECTION, SOLUTION INTRAMUSCULAR; INTRAVENOUS
Status: DISCONTINUED | OUTPATIENT
Start: 2025-07-28 | End: 2025-07-28 | Stop reason: SDUPTHER

## 2025-07-28 RX ORDER — MIDAZOLAM HYDROCHLORIDE 1 MG/ML
INJECTION, SOLUTION INTRAMUSCULAR; INTRAVENOUS
Status: DISCONTINUED | OUTPATIENT
Start: 2025-07-28 | End: 2025-07-28 | Stop reason: SDUPTHER

## 2025-07-28 RX ORDER — SODIUM CHLORIDE, SODIUM LACTATE, POTASSIUM CHLORIDE, CALCIUM CHLORIDE 600; 310; 30; 20 MG/100ML; MG/100ML; MG/100ML; MG/100ML
INJECTION, SOLUTION INTRAVENOUS CONTINUOUS
Status: DISCONTINUED | OUTPATIENT
Start: 2025-07-28 | End: 2025-07-28 | Stop reason: HOSPADM

## 2025-07-28 RX ORDER — ONDANSETRON 2 MG/ML
4 INJECTION INTRAMUSCULAR; INTRAVENOUS
Status: DISCONTINUED | OUTPATIENT
Start: 2025-07-28 | End: 2025-07-28 | Stop reason: HOSPADM

## 2025-07-28 RX ORDER — SODIUM CHLORIDE 9 MG/ML
INJECTION, SOLUTION INTRAVENOUS CONTINUOUS
Status: DISCONTINUED | OUTPATIENT
Start: 2025-07-28 | End: 2025-07-28 | Stop reason: HOSPADM

## 2025-07-28 RX ORDER — MIDAZOLAM HYDROCHLORIDE 2 MG/2ML
2 INJECTION, SOLUTION INTRAMUSCULAR; INTRAVENOUS
Status: DISCONTINUED | OUTPATIENT
Start: 2025-07-28 | End: 2025-07-28 | Stop reason: HOSPADM

## 2025-07-28 RX ORDER — FENTANYL CITRATE 50 UG/ML
100 INJECTION, SOLUTION INTRAMUSCULAR; INTRAVENOUS
Status: DISCONTINUED | OUTPATIENT
Start: 2025-07-28 | End: 2025-07-28 | Stop reason: HOSPADM

## 2025-07-28 RX ORDER — PROTAMINE SULFATE 10 MG/ML
INJECTION, SOLUTION INTRAVENOUS
Status: DISCONTINUED | OUTPATIENT
Start: 2025-07-28 | End: 2025-07-28 | Stop reason: SDUPTHER

## 2025-07-28 RX ORDER — PROPOFOL 10 MG/ML
INJECTION, EMULSION INTRAVENOUS
Status: DISCONTINUED | OUTPATIENT
Start: 2025-07-28 | End: 2025-07-28 | Stop reason: SDUPTHER

## 2025-07-28 RX ORDER — PHENYLEPHRINE HCL IN 0.9% NACL 0.4MG/10ML
SYRINGE (ML) INTRAVENOUS
Status: DISCONTINUED | OUTPATIENT
Start: 2025-07-28 | End: 2025-07-28 | Stop reason: SDUPTHER

## 2025-07-28 RX ORDER — OXYCODONE AND ACETAMINOPHEN 5; 325 MG/1; MG/1
1 TABLET ORAL EVERY 6 HOURS PRN
Qty: 12 TABLET | Refills: 0 | Status: SHIPPED | OUTPATIENT
Start: 2025-07-28 | End: 2025-07-31

## 2025-07-28 RX ORDER — GLYCOPYRROLATE 0.2 MG/ML
INJECTION INTRAMUSCULAR; INTRAVENOUS
Status: DISCONTINUED | OUTPATIENT
Start: 2025-07-28 | End: 2025-07-28 | Stop reason: SDUPTHER

## 2025-07-28 RX ORDER — LIDOCAINE HYDROCHLORIDE 10 MG/ML
1 INJECTION, SOLUTION EPIDURAL; INFILTRATION; INTRACAUDAL; PERINEURAL
Status: DISCONTINUED | OUTPATIENT
Start: 2025-07-28 | End: 2025-07-28 | Stop reason: HOSPADM

## 2025-07-28 RX ORDER — HEPARIN SODIUM 10000 [USP'U]/ML
INJECTION, SOLUTION INTRAVENOUS; SUBCUTANEOUS
Status: DISCONTINUED | OUTPATIENT
Start: 2025-07-28 | End: 2025-07-28 | Stop reason: SDUPTHER

## 2025-07-28 RX ORDER — DEXMEDETOMIDINE HYDROCHLORIDE 100 UG/ML
INJECTION, SOLUTION INTRAVENOUS
Status: DISCONTINUED | OUTPATIENT
Start: 2025-07-28 | End: 2025-07-28 | Stop reason: SDUPTHER

## 2025-07-28 RX ORDER — DIPHENHYDRAMINE HYDROCHLORIDE 50 MG/ML
12.5 INJECTION, SOLUTION INTRAMUSCULAR; INTRAVENOUS
Status: DISCONTINUED | OUTPATIENT
Start: 2025-07-28 | End: 2025-07-28 | Stop reason: HOSPADM

## 2025-07-28 RX ORDER — ONDANSETRON 2 MG/ML
INJECTION INTRAMUSCULAR; INTRAVENOUS
Status: DISCONTINUED | OUTPATIENT
Start: 2025-07-28 | End: 2025-07-28 | Stop reason: SDUPTHER

## 2025-07-28 RX ADMIN — MIDAZOLAM HYDROCHLORIDE 1 MG: 1 INJECTION, SOLUTION INTRAMUSCULAR; INTRAVENOUS at 14:01

## 2025-07-28 RX ADMIN — PROTAMINE SULFATE 25 MG: 10 INJECTION, SOLUTION INTRAVENOUS at 14:27

## 2025-07-28 RX ADMIN — PROPOFOL 50 MCG/KG/MIN: 10 INJECTION, EMULSION INTRAVENOUS at 14:41

## 2025-07-28 RX ADMIN — Medication 120 MCG: at 13:33

## 2025-07-28 RX ADMIN — SODIUM CHLORIDE: 9 INJECTION, SOLUTION INTRAVENOUS at 12:55

## 2025-07-28 RX ADMIN — DEXMEDETOMIDINE 4 MCG: 100 INJECTION, SOLUTION INTRAVENOUS at 13:10

## 2025-07-28 RX ADMIN — PROPOFOL 100 MCG/KG/MIN: 10 INJECTION, EMULSION INTRAVENOUS at 13:05

## 2025-07-28 RX ADMIN — DEXMEDETOMIDINE 4 MCG: 100 INJECTION, SOLUTION INTRAVENOUS at 13:06

## 2025-07-28 RX ADMIN — ROPIVACAINE HYDROCHLORIDE 15 ML: 5 INJECTION, SOLUTION EPIDURAL; INFILTRATION; PERINEURAL at 12:44

## 2025-07-28 RX ADMIN — Medication 80 MCG: at 13:30

## 2025-07-28 RX ADMIN — CEFAZOLIN SODIUM 2000 MG: 1 POWDER, FOR SOLUTION INTRAMUSCULAR; INTRAVENOUS at 13:25

## 2025-07-28 RX ADMIN — FENTANYL CITRATE 100 MCG: 50 INJECTION, SOLUTION INTRAMUSCULAR; INTRAVENOUS at 12:39

## 2025-07-28 RX ADMIN — PROPOFOL 10 MG: 10 INJECTION, EMULSION INTRAVENOUS at 14:39

## 2025-07-28 RX ADMIN — DEXMEDETOMIDINE 4 MCG: 100 INJECTION, SOLUTION INTRAVENOUS at 13:35

## 2025-07-28 RX ADMIN — Medication 40 MCG: at 13:26

## 2025-07-28 RX ADMIN — MEPIVACAINE HYDROCHLORIDE 15 ML: 15 INJECTION, SOLUTION EPIDURAL; INFILTRATION at 12:44

## 2025-07-28 RX ADMIN — ONDANSETRON 4 MG: 2 INJECTION, SOLUTION INTRAMUSCULAR; INTRAVENOUS at 13:40

## 2025-07-28 RX ADMIN — MIDAZOLAM HYDROCHLORIDE 1 MG: 1 INJECTION, SOLUTION INTRAMUSCULAR; INTRAVENOUS at 14:12

## 2025-07-28 RX ADMIN — HEPARIN SODIUM 5000 UNITS: 10000 INJECTION INTRAVENOUS; SUBCUTANEOUS at 13:49

## 2025-07-28 RX ADMIN — DEXMEDETOMIDINE 4 MCG: 100 INJECTION, SOLUTION INTRAVENOUS at 13:44

## 2025-07-28 RX ADMIN — MIDAZOLAM HYDROCHLORIDE 2 MG: 1 INJECTION, SOLUTION INTRAMUSCULAR; INTRAVENOUS at 12:39

## 2025-07-28 RX ADMIN — PHENYLEPHRINE HYDROCHLORIDE 50 MCG/MIN: 10 INJECTION INTRAVENOUS at 13:35

## 2025-07-28 RX ADMIN — Medication 80 MCG: at 13:28

## 2025-07-28 RX ADMIN — DEXMEDETOMIDINE 4 MCG: 100 INJECTION, SOLUTION INTRAVENOUS at 13:18

## 2025-07-28 RX ADMIN — GLYCOPYRROLATE 0.2 MG: 0.2 INJECTION INTRAMUSCULAR; INTRAVENOUS at 13:05

## 2025-07-28 ASSESSMENT — PAIN - FUNCTIONAL ASSESSMENT: PAIN_FUNCTIONAL_ASSESSMENT: 0-10

## 2025-07-28 NOTE — DISCHARGE INSTRUCTIONS
Patient Discharge Instructions    Jim Lucero / [id number] : 1975    Admitted 2025 Discharged: 2025     Take Home Medications            It is important that you take the medication exactly as they are prescribed.   Keep your medication in the bottles provided by the pharmacist and keep a list of the medication names, dosages, and times to be taken in your wallet.   Do not take other medications without consulting your doctor.       What to do at Home    Recommended diet: regular diet,     Recommended activity: activity as tolerated,      Follow-up with Dr Brennan in 2 weeks at [facility]        Information obtained by :  I understand that if any problems occur once I am at home I am to contact my physician.    I understand and acknowledge receipt of the instructions indicated above.                                                                                                                                           Physician's or R.N.'s Signature                                                                  Date/Time                                                                                                                                              Patient or Representative Signature                                                          Date/Time    DISCHARGE SUMMARY from your Nurse      PATIENT INSTRUCTIONS    After general anesthesia or intravenous sedation, for 24 hours or while taking prescription Narcotics:  Limit your activities  Do not drive and operate hazardous machinery  Do not make important personal or business decisions  Do  not drink alcoholic beverages  If you have not urinated within 8 hours after discharge, please contact your surgeon on call.    Report the following to your surgeon:  Excessive pain, swelling, redness or odor of or around the surgical area  Temperature over 100.5  Nausea and vomiting lasting longer than 4 hours or if unable to take

## 2025-07-28 NOTE — ANESTHESIA PRE PROCEDURE
Department of Anesthesiology  Preprocedure Note       Name:  Jim Lucero   Age:  50 y.o.  :  1975                                          MRN:  [id number]         Date:  2025      Surgeon: Surgeon(s):  Tk Brennan MD    Procedure: Procedure(s):  LEFT ARTERIOVENOUS GRAFT PLACEMENT    Medications prior to admission:   Prior to Admission medications    Medication Sig Start Date End Date Taking? Authorizing Provider   rosuvastatin (CRESTOR) 10 MG tablet Take 1 tablet by mouth nightly   Yes Provider, MD Bao   amLODIPine (NORVASC) 10 MG tablet Take 1 tablet by mouth daily  Patient taking differently: Take 1 tablet by mouth every morning 25  Yes Anibal Davila MD   carvedilol (COREG) 25 MG tablet Take 1 tablet by mouth 2 times daily 7/10/25  Yes Anibal Davila MD   furosemide (LASIX) 80 MG tablet Take 1 tablet by mouth 2 times daily 7/10/25  Yes Anibal Davila MD   hydrALAZINE (APRESOLINE) 50 MG tablet Take 1 tablet by mouth 3 times daily ceived the following from [facility]: Outside name: hydrALAZINE (APRESOLINE) 50 mg tablet 7/10/25 8/9/25 Yes Anibal Davila MD   atorvastatin (LIPITOR) 40 MG tablet Take 1 tablet by mouth daily  Patient not taking: Reported on 2025   Clint King MD       Current medications:    Current Facility-Administered Medications   Medication Dose Route Frequency Provider Last Rate Last Admin   • lidocaine PF 1 % injection 1 mL  1 mL IntraDERmal Once PRN Caden Cazares MD       • fentaNYL (SUBLIMAZE) injection 100 mcg  100 mcg IntraVENous Once PRN Caden Cazares MD       • lactated ringers infusion   IntraVENous Continuous Caden Cazares MD       • midazolam PF (VERSED) IntraVENous 2 mg  2 mg IntraVENous Once PRN Caden Cazares MD       • 0.9 % sodium chloride infusion   IntraVENous Continuous Tk Brennan MD       • ceFAZolin (ANCEF) 2,000 mg in sterile water 20 mL IV syringe  2,000 mg IntraVENous On Call to OR Mika

## 2025-07-28 NOTE — H&P
Vascular Surgery History & Physical    Subjective:     Jim Lucero is a 50 y.o.  male with ESRD     Past Medical History:   Diagnosis Date    (HFpEF) heart failure with preserved ejection fraction (HCC)     GUILLAUME (acute kidney injury) 5    5/20    CHF (congestive heart failure) (HCC) 05/2020    Chronic kidney disease 05/2020    CKD (chronic kidney disease)     Diabetes mellitus (HCC)     type II    Gout     HLD (hyperlipidemia)     Hypertension     Uncontrolled HTN 5/20    Kidney stones     Sickle cell trait       Past Surgical History:   Procedure Laterality Date    OTHER SURGICAL HISTORY      testicular surgery in childhood     Family History   Problem Relation Age of Onset    Unknown Mother     Asthma Sister     Asthma Brother     Anesth Problems Neg Hx       Social History     Tobacco Use    Smoking status: Never    Smokeless tobacco: Never   Substance Use Topics    Alcohol use: Never       Prior to Admission medications    Medication Sig Start Date End Date Taking? Authorizing Provider   rosuvastatin (CRESTOR) 10 MG tablet Take 1 tablet by mouth nightly   Yes Provider, MD Bao   atorvastatin (LIPITOR) 40 MG tablet Take 1 tablet by mouth daily  Patient not taking: Reported on 7/24/2025 7/11/25   Clint King MD   amLODIPine (NORVASC) 10 MG tablet Take 1 tablet by mouth daily  Patient taking differently: Take 1 tablet by mouth every morning 7/11/25   Anibal Davila MD   carvedilol (COREG) 25 MG tablet Take 1 tablet by mouth 2 times daily 7/10/25   Anibal Davila MD   furosemide (LASIX) 80 MG tablet Take 1 tablet by mouth 2 times daily 7/10/25   Anibal Davila MD   hydrALAZINE (APRESOLINE) 50 MG tablet Take 1 tablet by mouth 3 times daily ceived the following from [facility]: Outside name: hydrALAZINE (APRESOLINE) 50 mg tablet 7/10/25 8/9/25  Anibal Davila MD     Allergies   Allergen Reactions    Diphenhydramine Hives     As a child        Review of Systems

## 2025-07-28 NOTE — ANESTHESIA PROCEDURE NOTES
Peripheral Block    Patient location during procedure: pre-op  Reason for block: procedure for pain, post-op pain management, primary anesthetic and at surgeon's request  Start time: 7/28/2025 12:39 PM  End time: 7/28/2025 12:46 PM  Staffing  Performed: anesthesiologist   Anesthesiologist: Bob Johnson MD  Performed by: Bob Johnson MD  Authorized by: Bob Johnson MD    Preanesthetic Checklist  Completed: patient identified, IV checked, site marked, risks and benefits discussed, surgical/procedural consents, pre-op evaluation, timeout performed, anesthesia consent given, oxygen available and monitors applied/VS acknowledged  Peripheral Block   Patient position: supine  Prep: ChloraPrep  Provider prep: mask and sterile gloves  Patient monitoring: cardiac monitor, continuous pulse ox, continuous capnometry, frequent blood pressure checks, IV access, oxygen and responsive to questions  Block type: Brachial plexus  Supraclavicular  Laterality: left  Injection technique: single-shot  Guidance: ultrasound guided    Needle   Needle type: Other   Needle gauge: 22 G  Needle localization: ultrasound guidance  Needle length: 5 cmOther needle type: STIMUPLEX  Assessment   Injection assessment: negative aspiration for heme, no paresthesia on injection, local visualized surrounding nerve on ultrasound and no intravascular symptoms  Hemodynamics: stable  Outcomes: patient tolerated procedure well    Additional Notes  García RN witnessed timeout and block written on correct side

## 2025-07-28 NOTE — BRIEF OP NOTE
Brief Postoperative Note      Patient: Jim Lucero  YOB: 1975  MRN: [id number]    Date of Procedure: 7/28/2025    Pre-Op Diagnosis Codes:      * End stage renal disease (HCC) [N18.6]    Post-Op Diagnosis: Same       Procedure(s):  LEFT ARTERIOVENOUS GRAFT PLACEMENT    Surgeon(s):  Tk Brennan MD    Assistant:  Surgical Assistant: Ainsley Barnett    Anesthesia: Monitor Anesthesia Care    Estimated Blood Loss (mL): less than 100     Complications: None    Specimens:   * No specimens in log *    Implants:  Implant Name Type Inv. Item Serial No.  Lot No. LRB No. Used Action   GRAFT VASC L45CM DIA4-7MM PTFE CBAS HEP SURF STD WALLED - [id number] Vascular grafts GRAFT VASC L45CM DIA4-7MM PTFE CBAS HEP SURF STD WALLED [id number] [employer] N/A Left 1 Implanted         Drains: * No LDAs found *    Findings:  Present At Time Of Surgery (PATOS) (choose all levels that have infection present):  No infection present  Other Findings: 0    Electronically signed by Tk Brennan MD on 7/28/2025 at 2:48 PM

## 2025-07-28 NOTE — ANESTHESIA POSTPROCEDURE EVALUATION
Department of Anesthesiology  Postprocedure Note    Patient: Jim Lucero  MRN: [id number]  YOB: 1975  Date of evaluation: 7/28/2025    Procedure Summary       Date: 07/28/25 Room / Location: [facility] MAIN OR  / [facility] MAIN OR    Anesthesia Start: 1255 Anesthesia Stop: 1513    Procedure: LEFT ARTERIOVENOUS GRAFT PLACEMENT (Left: Arm Lower) Diagnosis:       End stage renal disease (HCC)      (End stage renal disease (HCC) [N18.6])    Surgeons: Tk Brennan MD Responsible Provider: Bob Johnson MD    Anesthesia Type: MAC, Regional ASA Status: 4            Anesthesia Type: MAC, Regional    Adiel Phase I: Adiel Score: 7    Adiel Phase II:      Anesthesia Post Evaluation    Patient location during evaluation: PACU  Patient participation: complete - patient participated  Level of consciousness: awake  Airway patency: patent  Nausea & Vomiting: no vomiting and no nausea  Cardiovascular status: hemodynamically stable  Respiratory status: acceptable  Hydration status: stable  Comments: Patient seen and examined.  Ready for discharge from PACU.  Multimodal analgesia pain management approach  Pain management: adequate and satisfactory to patient    No notable events documented.

## 2025-07-29 NOTE — OP NOTE
[facility]          [address] [place]  [address]                            OPERATIVE REPORT      PATIENT NAME: CHELO WILKINS            : 1975  MED REC NO: [id number]                       ROOM: OR  ACCOUNT NO: [id number]                       ADMIT DATE: 2025  PROVIDER: Tk Brennan MD    DATE OF SERVICE:  2025    PREOPERATIVE DIAGNOSES:  Chronic renal insufficiency.    POSTOPERATIVE DIAGNOSES:  Chronic renal insufficiency.    PROCEDURES PERFORMED:  Left upper arm AV graft placement.    SURGEON:  Tk Brennan MD    ASSISTANT:  None.    ANESTHESIA:  Regional anesthesia.    ESTIMATED BLOOD LOSS:  200 mL.    SPECIMENS REMOVED:  None.    INTRAOPERATIVE FINDINGS:  0     COMPLICATIONS:  None.    IMPLANTS:  Graft.    INDICATIONS:  The patient is a 50-year-old male with chronic renal insufficiency and getting ready to require dialysis.  Decision was made to take him to the operating room for access creation.    DESCRIPTION OF PROCEDURE:  The patient was taken to the operating room.  Once the suitable level of anesthesia was introduced, he was prepped and draped in typical sterile fashion.  Longitudinal incision was made in the left axilla and dissection carried out down to the axillary vein, which was dissected free of their soft tissue attachments.  The vessels in the axilla were quite deep.  The decision was made to place a straight graft.  Counter incision was made in the antecubital fossa and dissection carried out down to the distal brachial artery, which was similarly dissected free of their soft tissue attachments.  The patient was systemically heparinized.  A 4 to 7 tapered graft was sewed end-to-side to the brachial artery using 5-0 Daniels-Lyndon suture.  It was then tunneled via single counter incision and sewed end-to-side to the axillary vein.  Flow restored to the graft and a palpable thrill and an audible bruit with good signals

## 2025-08-07 ENCOUNTER — OFFICE VISIT (OUTPATIENT)
Age: 50
End: 2025-08-07
Payer: COMMERCIAL

## 2025-08-07 VITALS
HEIGHT: 68 IN | RESPIRATION RATE: 17 BRPM | BODY MASS INDEX: 39.28 KG/M2 | SYSTOLIC BLOOD PRESSURE: 136 MMHG | OXYGEN SATURATION: 94 % | WEIGHT: 259.2 LBS | DIASTOLIC BLOOD PRESSURE: 76 MMHG | HEART RATE: 77 BPM | TEMPERATURE: 98.7 F

## 2025-08-07 VITALS
DIASTOLIC BLOOD PRESSURE: 80 MMHG | SYSTOLIC BLOOD PRESSURE: 120 MMHG | OXYGEN SATURATION: 93 % | HEIGHT: 68 IN | HEART RATE: 82 BPM | WEIGHT: 260 LBS | RESPIRATION RATE: 16 BRPM | BODY MASS INDEX: 39.4 KG/M2

## 2025-08-07 DIAGNOSIS — I50.32 CHRONIC HEART FAILURE WITH PRESERVED EJECTION FRACTION (HCC): Primary | ICD-10-CM

## 2025-08-07 DIAGNOSIS — Z13.220 SCREENING CHOLESTEROL LEVEL: ICD-10-CM

## 2025-08-07 DIAGNOSIS — Z23 NEED FOR PNEUMOCOCCAL VACCINE: Primary | ICD-10-CM

## 2025-08-07 DIAGNOSIS — Z11.4 SCREENING FOR HIV (HUMAN IMMUNODEFICIENCY VIRUS): ICD-10-CM

## 2025-08-07 DIAGNOSIS — Z12.11 ENCOUNTER FOR SCREENING COLONOSCOPY: ICD-10-CM

## 2025-08-07 DIAGNOSIS — N18.5 STAGE 5 CHRONIC KIDNEY DISEASE NOT ON CHRONIC DIALYSIS (HCC): Chronic | ICD-10-CM

## 2025-08-07 DIAGNOSIS — I50.9 OTHER CONGESTIVE HEART FAILURE (HCC): ICD-10-CM

## 2025-08-07 DIAGNOSIS — R68.89 COLD INTOLERANCE: ICD-10-CM

## 2025-08-07 DIAGNOSIS — Z01.84 IMMUNITY STATUS TESTING: ICD-10-CM

## 2025-08-07 DIAGNOSIS — Z23 NEED FOR TETANUS, DIPHTHERIA, AND ACELLULAR PERTUSSIS (TDAP) VACCINE: ICD-10-CM

## 2025-08-07 DIAGNOSIS — I10 ESSENTIAL (PRIMARY) HYPERTENSION: Chronic | ICD-10-CM

## 2025-08-07 PROCEDURE — 99213 OFFICE O/P EST LOW 20 MIN: CPT

## 2025-08-07 PROCEDURE — 3079F DIAST BP 80-89 MM HG: CPT | Performed by: NURSE PRACTITIONER

## 2025-08-07 PROCEDURE — 3078F DIAST BP <80 MM HG: CPT

## 2025-08-07 PROCEDURE — 3075F SYST BP GE 130 - 139MM HG: CPT

## 2025-08-07 PROCEDURE — 3074F SYST BP LT 130 MM HG: CPT | Performed by: NURSE PRACTITIONER

## 2025-08-07 PROCEDURE — 99214 OFFICE O/P EST MOD 30 MIN: CPT | Performed by: NURSE PRACTITIONER

## 2025-08-07 ASSESSMENT — ENCOUNTER SYMPTOMS
APNEA: 0
CHEST TIGHTNESS: 0
ABDOMINAL DISTENTION: 0
DIARRHEA: 0
NAUSEA: 0
VOMITING: 0
SHORTNESS OF BREATH: 0
CONSTIPATION: 0

## 2025-08-07 ASSESSMENT — PATIENT HEALTH QUESTIONNAIRE - PHQ9
SUM OF ALL RESPONSES TO PHQ QUESTIONS 1-9: 0
1. LITTLE INTEREST OR PLEASURE IN DOING THINGS: NOT AT ALL
SUM OF ALL RESPONSES TO PHQ QUESTIONS 1-9: 0
2. FEELING DOWN, DEPRESSED OR HOPELESS: NOT AT ALL

## 2025-08-08 LAB
CHOLEST SERPL-MCNC: 120 MG/DL (ref 0–200)
HBV SURFACE AB SERPL IA-ACNC: NONREACTIVE MIU/ML
HBV SURFACE AG SER QL: NONREACTIVE
HDLC SERPL-MCNC: 43 MG/DL (ref 40–60)
HDLC SERPL: 2.8 (ref 0–5)
HIV 1+2 AB+HIV1 P24 AG SERPL QL IA: NONREACTIVE
HIV 1/2 RESULT COMMENT: NORMAL
LDLC SERPL CALC-MCNC: 60 MG/DL (ref 0–100)
TRIGL SERPL-MCNC: 84 MG/DL (ref 0–150)
TSH, 3RD GENERATION: 2.71 UIU/ML (ref 0.27–4.2)
VLDLC SERPL CALC-MCNC: 17 MG/DL

## 2025-08-09 LAB — HBV CORE AB SERPL QL IA: NEGATIVE

## 2025-08-13 LAB
ALBUMIN SERPL ELPH-MCNC: 3.6 G/DL (ref 2.9–4.4)
ALBUMIN/GLOB SERPL: 1.1 {RATIO} (ref 0.7–1.7)
ALPHA1 GLOB SERPL ELPH-MCNC: 0.3 G/DL (ref 0–0.4)
ALPHA2 GLOB SERPL ELPH-MCNC: 0.7 G/DL (ref 0.4–1)
B-GLOBULIN SERPL ELPH-MCNC: 0.9 G/DL (ref 0.7–1.3)
GAMMA GLOB SERPL ELPH-MCNC: 1.4 G/DL (ref 0.4–1.8)
GLOBULIN SER CALC-MCNC: 3.3 G/DL (ref 2.2–3.9)
LABORATORY COMMENT REPORT: NORMAL
M PROTEIN SERPL ELPH-MCNC: NORMAL G/DL
PROT PATTERN SERPL ELPH-IMP: NORMAL
PROT SERPL-MCNC: 6.9 G/DL (ref 6–8.5)

## 2025-08-14 LAB
KAPPA LC FREE SER-MCNC: 177.3 MG/L (ref 3.3–19.4)
KAPPA LC FREE/LAMBDA FREE SER: 1.78 {RATIO} (ref 0.26–1.65)
LAMBDA LC FREE SERPL-MCNC: 99.7 MG/L (ref 5.7–26.3)

## 2025-08-25 RX ORDER — AMLODIPINE BESYLATE 10 MG/1
10 TABLET ORAL DAILY
Qty: 90 TABLET | Refills: 3 | Status: SHIPPED | OUTPATIENT
Start: 2025-08-25

## 2025-08-25 RX ORDER — HYDRALAZINE HYDROCHLORIDE 50 MG/1
50 TABLET, FILM COATED ORAL 3 TIMES DAILY
Qty: 270 TABLET | Refills: 3 | Status: SHIPPED | OUTPATIENT
Start: 2025-08-25

## 2025-08-25 RX ORDER — CARVEDILOL 25 MG/1
25 TABLET ORAL 2 TIMES DAILY
Qty: 180 TABLET | Refills: 3 | Status: SHIPPED | OUTPATIENT
Start: 2025-08-25

## 2025-08-28 RX ORDER — FUROSEMIDE 80 MG/1
80 TABLET ORAL 2 TIMES DAILY
Qty: 180 TABLET | Refills: 0 | Status: SHIPPED | OUTPATIENT
Start: 2025-08-28

## (undated) DEVICE — SLING ORTHOPEDIC PCH UNIV 19.5X9 IN 2-39 IN ARM W/ FOAM STRP

## (undated) DEVICE — DECANTER BAG 9": Brand: MEDLINE INDUSTRIES, INC.

## (undated) DEVICE — VASCULAR-SFMC: Brand: MEDLINE INDUSTRIES, INC.

## (undated) DEVICE — BANDAGE,GAUZE,BULKEE II,4.5"X4.1YD,STRL: Brand: MEDLINE

## (undated) DEVICE — DRAPE,REIN 53X77,STERILE: Brand: MEDLINE

## (undated) DEVICE — CLIP LIG M BLU TI HRT SHP WIRE HORZ 6 CLIPS PER PK
Type: IMPLANTABLE DEVICE | Site: ARM | Status: NON-FUNCTIONAL
Removed: 2025-07-28

## (undated) DEVICE — GLOVE ORANGE PI 8   MSG9080

## (undated) DEVICE — SUTURE VICRYL + SZ 2-0 L27IN ABSRB UD CT-2 L26MM 1/2 CIR TAPR VCP269H

## (undated) DEVICE — DRAPE,UTILITY,TAPE,15X26,STERILE: Brand: MEDLINE

## (undated) DEVICE — SUTURE VICRYL + SZ 3-0 L27IN ABSRB UD L26MM SH 1/2 CIR VCP416H

## (undated) DEVICE — PART NUMBER 108260, VTI 8 MHZ DISPOSABLE DOPPLER PROBE, STRAIGHT, BOX: Brand: VTI 8 MHZ DISPOSABLE DOPPLER PROBE, STRAIGHT, BOX

## (undated) DEVICE — AGENT HEMOSTATIC SURGIFLOW MATRIX KIT W/THROMBIN

## (undated) DEVICE — SUTURE PROL SZ 6-0 L24IN NONABSORBABLE BLU L9.3MM BV-1 3/8 8805H

## (undated) DEVICE — ELECTRODE PT RET AD L9FT HI MOIST COND ADH HYDRGEL CORDED

## (undated) DEVICE — SYRINGE MED 5ML STD CLR PLAS LUERLOCK TIP N CTRL DISP

## (undated) DEVICE — SUTURE CV-5 24IN THC-13 DA 1 DZ 5K12A

## (undated) DEVICE — GEL US 20GM NONIRRITATING OVERWRAPPED FILE PCH TRNSMIT

## (undated) DEVICE — Device

## (undated) DEVICE — SOLUTION IV 500ML 0.9% SOD BOTTLE CHL LTWT DURABLE SHATTERPROOF

## (undated) DEVICE — SUTURE MONOCRYL SZ 4-0 L27IN ABSRB UD L19MM PS-2 1/2 CIR PRIM Y426H

## (undated) DEVICE — LIQUIBAND RAPID ADHESIVE 36/CS 0.8ML: Brand: MEDLINE

## (undated) DEVICE — SOLUTION IRRIG 1000ML H2O PIC PLAS SHATTERPROOF CONTAINER